# Patient Record
Sex: FEMALE | Race: WHITE | NOT HISPANIC OR LATINO | Employment: OTHER | URBAN - METROPOLITAN AREA
[De-identification: names, ages, dates, MRNs, and addresses within clinical notes are randomized per-mention and may not be internally consistent; named-entity substitution may affect disease eponyms.]

---

## 2017-06-13 ENCOUNTER — TRANSCRIBE ORDERS (OUTPATIENT)
Dept: ADMINISTRATIVE | Facility: HOSPITAL | Age: 62
End: 2017-06-13

## 2017-06-13 DIAGNOSIS — Z00.00 ROUTINE MEDICAL EXAM: ICD-10-CM

## 2017-06-13 DIAGNOSIS — Z78.0 POSTMENOPAUSAL: Primary | ICD-10-CM

## 2017-06-23 ENCOUNTER — HOSPITAL ENCOUNTER (OUTPATIENT)
Dept: MAMMOGRAPHY | Facility: HOSPITAL | Age: 62
Discharge: HOME | End: 2017-06-23
Attending: INTERNAL MEDICINE

## 2017-06-23 ENCOUNTER — HOSPITAL ENCOUNTER (OUTPATIENT)
Dept: BONE DENSITY | Facility: HOSPITAL | Age: 62
Discharge: HOME | End: 2017-06-23
Attending: INTERNAL MEDICINE | Admitting: INTERNAL MEDICINE

## 2017-06-23 DIAGNOSIS — Z78.0 POSTMENOPAUSAL: ICD-10-CM

## 2017-06-23 DIAGNOSIS — Z00.00 ROUTINE MEDICAL EXAM: ICD-10-CM

## 2017-06-23 PROCEDURE — 77080 DXA BONE DENSITY AXIAL: CPT

## 2017-06-23 PROCEDURE — 77063 BREAST TOMOSYNTHESIS BI: CPT

## 2017-06-23 PROCEDURE — G0202 SCR MAMMO BI INCL CAD: HCPCS

## 2017-12-01 ENCOUNTER — HOSPITAL ENCOUNTER (OUTPATIENT)
Dept: GENERAL RADIOLOGY | Facility: HOSPITAL | Age: 62
Discharge: HOME | End: 2017-12-01
Attending: INTERNAL MEDICINE

## 2017-12-01 ENCOUNTER — HOSPITAL ENCOUNTER (OUTPATIENT)
Dept: GENERAL RADIOLOGY | Facility: HOSPITAL | Age: 62
Discharge: HOME | End: 2017-12-01
Attending: INTERNAL MEDICINE | Admitting: INTERNAL MEDICINE

## 2017-12-01 DIAGNOSIS — M47.812 ARTHRITIS OF NECK: ICD-10-CM

## 2017-12-01 DIAGNOSIS — M53.3 DISORDER OF SACRUM: ICD-10-CM

## 2017-12-01 PROCEDURE — 72050 X-RAY EXAM NECK SPINE 4/5VWS: CPT

## 2017-12-01 PROCEDURE — 72110 X-RAY EXAM L-2 SPINE 4/>VWS: CPT

## 2017-12-01 PROCEDURE — 72170 X-RAY EXAM OF PELVIS: CPT

## 2018-04-15 ENCOUNTER — HOSPITAL ENCOUNTER (OUTPATIENT)
Dept: GENERAL RADIOLOGY | Facility: HOSPITAL | Age: 63
Discharge: HOME | End: 2018-04-15
Attending: RADIOLOGY

## 2018-04-15 ENCOUNTER — HOSPITAL ENCOUNTER (EMERGENCY)
Facility: HOSPITAL | Age: 63
End: 2018-04-15

## 2018-04-15 ENCOUNTER — HOSPITAL ENCOUNTER (OUTPATIENT)
Dept: GENERAL RADIOLOGY | Facility: HOSPITAL | Age: 63
Discharge: HOME | End: 2018-04-15
Attending: RADIOLOGY | Admitting: RADIOLOGY

## 2018-04-15 VITALS
RESPIRATION RATE: 16 BRPM | WEIGHT: 145 LBS | OXYGEN SATURATION: 98 % | HEIGHT: 67 IN | HEART RATE: 73 BPM | BODY MASS INDEX: 22.76 KG/M2

## 2018-04-15 DIAGNOSIS — T14.8XXA FRACTURE: ICD-10-CM

## 2018-04-15 PROCEDURE — 73130 X-RAY EXAM OF HAND: CPT

## 2018-04-15 PROCEDURE — 73630 X-RAY EXAM OF FOOT: CPT

## 2018-04-16 ENCOUNTER — TELEPHONE (OUTPATIENT)
Dept: ORTHOPEDIC SURGERY | Facility: CLINIC | Age: 63
End: 2018-04-16

## 2018-04-16 ENCOUNTER — OFFICE VISIT (OUTPATIENT)
Dept: ORTHOPEDIC SURGERY | Facility: CLINIC | Age: 63
End: 2018-04-16

## 2018-04-16 VITALS — BODY MASS INDEX: 23.45 KG/M2 | TEMPERATURE: 98.2 F | HEIGHT: 67 IN | WEIGHT: 149.4 LBS

## 2018-04-16 DIAGNOSIS — M18.12 OSTEOARTHRITIS OF LEFT THUMB: Primary | ICD-10-CM

## 2018-04-16 DIAGNOSIS — S92.355A CLOSED NONDISPLACED FRACTURE OF FIFTH METATARSAL BONE OF LEFT FOOT, INITIAL ENCOUNTER: ICD-10-CM

## 2018-04-16 PROCEDURE — 99204 OFFICE O/P NEW MOD 45 MIN: CPT | Performed by: ORTHOPAEDIC SURGERY

## 2018-04-16 PROCEDURE — 28470 CLTX METATARSAL FX WO MNP EA: CPT | Performed by: ORTHOPAEDIC SURGERY

## 2018-04-16 RX ORDER — LAMOTRIGINE 100 MG/1
TABLET ORAL
Refills: 0 | COMMUNITY
Start: 2018-02-17 | End: 2018-11-28

## 2018-04-16 RX ORDER — LEVOTHYROXINE SODIUM 88 MCG
TABLET ORAL
Refills: 0 | COMMUNITY
Start: 2018-04-06 | End: 2018-04-23 | Stop reason: SDUPTHER

## 2018-04-16 RX ORDER — BUSPIRONE HYDROCHLORIDE 15 MG/1
TABLET ORAL
Refills: 0 | COMMUNITY
Start: 2018-02-17 | End: 2020-12-28 | Stop reason: ALTCHOICE

## 2018-04-16 NOTE — TELEPHONE ENCOUNTER
Dr. Roberto Gutiérrez asking for someone to see his wife. Took her to [facility] for x-ray only (not ER visit) on 4/15. Says she has a metatarsal fracture. Please advise.

## 2018-04-16 NOTE — PROGRESS NOTES
New Patient Complaint      Patient: Cathy Gutiérrez  YOB: 1955 63 y.o. female  Medical Record Number: [id number]    Chief Complaints: I hurt my foot and my thumb    History of Present Illness:    Patient sustained an injury on 4/15/18 when she slipped in his door frame striking her left foot and left thumb.  She's had persistent severe constant aching pain in the left foot and mainly around the IP joint of the thumb with associated redness bruising and swelling worse with standing and walking and improved with rest.  Prior to this she had some mild occasional achiness in the thumb     Her  who is a physician sent her for x-rays and she was worked in here today for evaluation.  She denies any numbness    HPI    Allergies:   Allergies   Allergen Reactions   • Latex Rash       Medications:   Current Outpatient Prescriptions on File Prior to Visit   Medication Sig   • lisinopril (PRINIVIL) 10 MG tablet Take 10 mg by mouth Daily.   • [DISCONTINUED] azithromycin (ZITHROMAX Z-DON) 250 MG tablet Take 2 tablets the first day, then 1 tablet daily for 4 days.   • [DISCONTINUED] benzonatate (TESSALON) 200 MG capsule Take 1 capsule by mouth 3 (Three) Times a Day As Needed for cough.   • [DISCONTINUED] estradiol (CLIMARA) 0.025 MG/24HR patch Place 1 patch on the skin 1 (one) time per week.   • [DISCONTINUED] LamoTRIgine  MG tablet sustained-release 24 hour Take  by mouth.   • [DISCONTINUED] levothyroxine (SYNTHROID) 125 MCG tablet Take  by mouth.   • [DISCONTINUED] MethylPREDNISolone (MEDROL, DON,) 4 MG tablet follow package directions   • [DISCONTINUED] PROAIR RESPICLICK 108 (90 BASE) MCG/ACT inhaler 1-2 inhalations every 6-8 hours as needed   • [DISCONTINUED] zolpidem (AMBIEN) 10 MG tablet Take  by mouth.     No current facility-administered medications on file prior to visit.        Past Medical History:   Diagnosis Date   • Depression    • Disease of thyroid gland    • Hypertension      Past  "Surgical History:   Procedure Laterality Date   • BREAST BIOPSY       Social History     Occupational History   • Not on file.     Social History Main Topics   • Smoking status: Never Smoker   • Smokeless tobacco: Not on file   • Alcohol use Yes   • Drug use: Unknown   • Sexual activity: Not on file      Social History     Social History Narrative   • No narrative on file     Family History   Problem Relation Age of Onset   • Breast cancer Maternal Grandmother    • Breast cancer Paternal Grandmother    • Breast cancer Maternal Aunt    • Breast cancer Paternal Aunt        Review of Systems: 14 point review of systems performed, positive pertinent findings identified in HPI. All remaining systems negative     Review of Systems      Physical Exam:   Vitals:    04/16/18 1321   Temp: 98.2 °F (36.8 °C)   TempSrc: Temporal Artery    Weight: 67.8 kg (149 lb 6.4 oz)   Height: 170.2 cm (67\")     Physical Exam   Constitutional: pleasant, well developed   Eyes: sclera non icteric  Hearing : adequate for exam  Cardiovascular: palpable pulses in left foot and wrist, left calf/ thigh NT without sign of DVT  Respiratoy: breathing unlabored   Neurological: grossly sensate to LT throughout left LE and UE  Psychiatric: oriented with normal mood and affect.   Lymphatic: No palpable popliteal lymphadenopathy left LE  Skin: intact throughout left leg/foot/hand  Musculoskeletal: Left foot shows mild discomfort over the distal aspect of the fifth metatarsal some mild swelling and bruising she was nontender proximally there was no tenderness to palpation about the ankle.  Left thumb showed mild discomfort around the thumb IP joint but she was able to actively flex and extend this.  There was minimal discomfort at the MCP and mild to moderate discomfort at the CMC joint.  No clinical instability was noted at the MCP joint  Physical Exam  Ortho Exam    Radiology: 3 views of the left hand and 3 views of the left foot were reviewed on the " [facility] system from 4/15/18.  There is a nondisplaced oblique fracture of the distal aspect of the fifth metatarsal.  I do not see any obvious fractures in the hand there is arthritic change at the thumb CMC more so than the IP or MCP joint.      Assessment/Plan: 1.  Left fifth metatarsal fracture  2.  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  She was fitted with a thumb spica splint and if symptoms persist we'll get an MRI    Left fifth metatarsal fracture is amenable to closed treatment at this time if she has persistent pain or displacement this may require surgical treatment but I think this is unlikely.  She was fitted with a short boot today for use with any weightbearing and will use a cane that she already has from a friend.  I will see her back in one week with x-rays of her left foot.

## 2018-04-18 ENCOUNTER — TELEPHONE (OUTPATIENT)
Dept: ORTHOPEDIC SURGERY | Facility: CLINIC | Age: 63
End: 2018-04-18

## 2018-04-23 ENCOUNTER — OFFICE VISIT (OUTPATIENT)
Dept: ORTHOPEDIC SURGERY | Facility: CLINIC | Age: 63
End: 2018-04-23

## 2018-04-23 VITALS — HEIGHT: 67 IN | TEMPERATURE: 97.7 F | WEIGHT: 151 LBS | BODY MASS INDEX: 23.7 KG/M2

## 2018-04-23 DIAGNOSIS — Z87.81 HISTORY OF FRACTURE: ICD-10-CM

## 2018-04-23 DIAGNOSIS — M18.12 OSTEOARTHRITIS OF LEFT THUMB: Primary | ICD-10-CM

## 2018-04-23 DIAGNOSIS — S92.355D CLOSED NONDISPLACED FRACTURE OF FIFTH METATARSAL BONE OF LEFT FOOT WITH ROUTINE HEALING, SUBSEQUENT ENCOUNTER: ICD-10-CM

## 2018-04-23 PROCEDURE — 99024 POSTOP FOLLOW-UP VISIT: CPT | Performed by: ORTHOPAEDIC SURGERY

## 2018-04-23 PROCEDURE — 73630 X-RAY EXAM OF FOOT: CPT | Performed by: ORTHOPAEDIC SURGERY

## 2018-04-23 RX ORDER — DEXTROAMPHETAMINE SACCHARATE, AMPHETAMINE ASPARTATE, DEXTROAMPHETAMINE SULFATE AND AMPHETAMINE SULFATE 2.5; 2.5; 2.5; 2.5 MG/1; MG/1; MG/1; MG/1
2 TABLET ORAL DAILY
Refills: 0 | COMMUNITY
Start: 2018-04-21 | End: 2022-03-09 | Stop reason: SDUPTHER

## 2018-04-23 RX ORDER — LEVOTHYROXINE SODIUM 112 MCG
112 TABLET ORAL
Refills: 0 | COMMUNITY
Start: 2018-04-18 | End: 2018-08-16

## 2018-04-23 NOTE — PROGRESS NOTES
"Foot Follow Up      Patient: Cathy Gutiérrez    YOB: 1955 63 y.o. female    Chief Complaints: Foot pain    History of Present Illness: Follows up injury from 4/15/18 with initial evaluation on 4/16/18 for left fifth metatarsal fracture and thumb injury.    She's been using a boot and complains of mild intermittent aching pain mainly on the lateral aspect left fifth metatarsal distally with a boot \"pushes on it\".  Thumb feels a little better but some moderate occasional intermittent achiness that has been improved some with use of the splint.  HPI    ROS: Foot pain my thumb pain  Past Medical History:   Diagnosis Date   • Depression    • Disease of thyroid gland    • Hypertension      Physical Exam:   Vitals:    04/23/18 1100   Temp: 97.7 °F (36.5 °C)   TempSrc: Temporal Artery    Weight: 68.5 kg (151 lb)   Height: 170.2 cm (67\")     Well developed with normal mood.Foot shows mild swelling along the fifth metatarsal with discomfort palpation distally but no skin breakdown.  Left thumb shows some mild discomfort around the MCP more so than CMC joint with chronic-appearing arthritic change.  She has discomfort with flexion and extension of the thumb.      Radiology: 3 views left foot ordered to evaluate pain reviewed and compared with previous x-rays.  There is been minimal if any change in alignment to the fifth metatarsal oblique distal fracture certainly nothing that would require surgical treatment based on these x-rays      Assessment/Plan:  1.  Left fifth metatarsal fracture  Patient will continue with her boot have her fitted with a cane as she still having some discomfort with ambulation.  She needs to wear this whenever she is weightbearing and I will see her back in 2 weeks' x-rays of her left foot.    2.  Persistent left thumb pain.  She has seen Dr Borrero in the past for her elbow and we'll make arrangements for her to see him for further evaluation and treatment options regarding her " thumb.    I will see her back in 2 weeks' x-rays of her left foot.

## 2018-04-27 ENCOUNTER — TELEPHONE (OUTPATIENT)
Dept: ORTHOPEDIC SURGERY | Facility: CLINIC | Age: 63
End: 2018-04-27

## 2018-04-30 NOTE — TELEPHONE ENCOUNTER
I returned patient's phone call she was inquiring about physical therapy limitations regarding her foot.  She is doing physical therapy for her hip.  I stated that she could do physical therapy for hip with an the parameters of partial weightbearing on the boot with the cane only.  She stated she did not really use the cane much over the weekend and was continuing to have pain in the foot.  I recommended that she limit activities and use a cane for partial weightbearing for the foot only.  If symptoms worsen she'll let me know

## 2018-05-07 ENCOUNTER — OFFICE VISIT (OUTPATIENT)
Dept: ORTHOPEDIC SURGERY | Facility: CLINIC | Age: 63
End: 2018-05-07

## 2018-05-07 VITALS — HEIGHT: 67 IN | BODY MASS INDEX: 23.7 KG/M2 | WEIGHT: 151 LBS | TEMPERATURE: 98 F

## 2018-05-07 DIAGNOSIS — B35.1 ONYCHOMYCOSIS: Primary | ICD-10-CM

## 2018-05-07 DIAGNOSIS — M20.42 HAMMER TOE OF LEFT FOOT: ICD-10-CM

## 2018-05-07 DIAGNOSIS — S92.355D CLOSED NONDISPLACED FRACTURE OF FIFTH METATARSAL BONE OF LEFT FOOT WITH ROUTINE HEALING, SUBSEQUENT ENCOUNTER: ICD-10-CM

## 2018-05-07 PROCEDURE — 73630 X-RAY EXAM OF FOOT: CPT | Performed by: ORTHOPAEDIC SURGERY

## 2018-05-07 PROCEDURE — 99024 POSTOP FOLLOW-UP VISIT: CPT | Performed by: ORTHOPAEDIC SURGERY

## 2018-05-07 RX ORDER — MELOXICAM 15 MG/1
15 TABLET ORAL DAILY
Refills: 0 | COMMUNITY
Start: 2018-05-02 | End: 2018-08-16

## 2018-05-07 NOTE — PROGRESS NOTES
"Foot Follow Up      Patient: Cathy Gutiérrez    YOB: 1955 63 y.o. female    Chief Complaints: Foot pain    History of Present Illness: Injured left foot on 4/15/18 with initial evaluation on 4/16/18 with left fifth metatarsal fracture.    She was last seen on 4/23/18 with instructions to use her cane and boot for protected weightbearing.  She's only been using a cane occasionally and states that she \"keeps losing it\".  She's been on her foot ,sounds like quite a bit cleaning walking etc.  She states the pain is somewhat better but still gets some mild intermittent aching pain with swelling in the lateral aspect of the foot.    She also asked me look at her left second and third toes which she says she gets an occasional mild discomfort where they rub in her shoe.    She had bunion surgery by Dr. Rodriguez many years ago and then about a year and a half ago lived in [place] and did an extensive amount of dancing with some increased pain and swelling around the second and third MTP and PIP joints which has improved to some degree but with residual deformity.    She also asked me look at her right first toe where she has had fungal changes to the great toenail.  HPI    ROS: Foot pain  Past Medical History:   Diagnosis Date   • Depression    • Disease of thyroid gland    • Hypertension      Physical Exam:   Vitals:    05/07/18 1146   Temp: 98 °F (36.7 °C)   Weight: 68.5 kg (151 lb)   Height: 170.2 cm (67\")     Well developed with normal mood.Left foot shows minimal swelling along the fifth metatarsal and only slight discomfort palpation without clinical deformity.  There is well-healed incisions the first MTP joint slight relative elongation of the second and third toes with some slight hammering at the PIP joint.  No irritability or gross instability to the second or third MTP joints.    Right great toe shows some in curving medially and laterally with onychomycotic changes but no sign of " "infection.      Radiology: 3 views of the right foot ordered to evaluate pain reviewed and compared with previous x-rays.  There is been a very slight shift proximally of the fifth metatarsal fracture but no appreciable change in alignment as far as angulation or rotation.  There is relative elongation of the second and third metatarsals compared with the first with some mild arthritic change of the first MTP joint with evidence of previous bunion correction there is some chronic questionable flattening of the second metatarsal head      Assessment/Plan:  1.  Left fifth metatarsal fracture    I reviewed these x-rays findings with her.  Counseled her that I would not anticipate any surgical treatment for this but she needs to continue use of her boot limit activity as much as possible and she was fitted with a better fitted cane today as well as a \"even up\" if she's been having some back pain it's been aggravated due to unevenness of her boot.    2.  Left thumb pain she has seen a hand specialist for this and was told this was arthritis and will continue to follow with him for any problems with that.  3.  Left second and third hammertoes.  We discussed treatment options and nothing I would do from a surgical standpoint at this time.  I demonstrated stretching exercises for her to do several times a day if symptoms persist or worsen may require further bracing stretching or surgical treatment  4.  Right great toe onychomycosis.  I gave her the name and number of Dr. Stu Aburto a reputable podiatrist for evaluation and treatment of this.    We had a very pleasant visit I will see her back in 3 weeks' x-rays of her left foot.  If she has any increase in pain in the interim she'll let me know  "

## 2018-05-30 ENCOUNTER — OFFICE VISIT (OUTPATIENT)
Dept: ORTHOPEDIC SURGERY | Facility: CLINIC | Age: 63
End: 2018-05-30

## 2018-05-30 VITALS — WEIGHT: 151 LBS | HEIGHT: 67 IN | TEMPERATURE: 98.1 F | BODY MASS INDEX: 23.7 KG/M2

## 2018-05-30 DIAGNOSIS — M20.42 HAMMER TOE OF LEFT FOOT: ICD-10-CM

## 2018-05-30 DIAGNOSIS — S92.355D CLOSED NONDISPLACED FRACTURE OF FIFTH METATARSAL BONE OF LEFT FOOT WITH ROUTINE HEALING, SUBSEQUENT ENCOUNTER: ICD-10-CM

## 2018-05-30 DIAGNOSIS — S92.902D CLOSED FRACTURE OF LEFT FOOT WITH ROUTINE HEALING, SUBSEQUENT ENCOUNTER: Primary | ICD-10-CM

## 2018-05-30 PROCEDURE — 99024 POSTOP FOLLOW-UP VISIT: CPT | Performed by: ORTHOPAEDIC SURGERY

## 2018-05-30 PROCEDURE — 73630 X-RAY EXAM OF FOOT: CPT | Performed by: ORTHOPAEDIC SURGERY

## 2018-05-30 NOTE — PROGRESS NOTES
"Foot Follow Up      Patient: Cathy Gutiérrez    YOB: 1955 63 y.o. female    Chief Complaints: Foot pain    History of Present Illness: Injured left foot on 4/15/18 with initial evaluation on 4/16/18 for left fifth metatarsal fracture she was last seen 5/7/18 and has continued to use her boot as well as a cane but \"keeps losing it around her house\".  Pain has improved to the left foot with intermittent mild aching pain in the lateral aspect of the left foot.  At her last visit, I also saw for left second and third hammertoes with some discomfort when they  rub in her shoe. she does not have any problems with them since then because she's been in her boot.    She also saw a podiatrist for her toenails and is being treated for nail pathology there  HPI    ROS: Foot pain fevers chills chest pain or shortness of breath  Past Medical History:   Diagnosis Date   • Depression    • Disease of thyroid gland    • Hypertension      Physical Exam:   Vitals:    05/30/18 1037   Temp: 98.1 °F (36.7 °C)   Weight: 68.5 kg (151 lb)   Height: 170.2 cm (67\")     Well developed with normal mood.Left foot shows only minimal swelling.  There was only slight discomfort along the fifth metatarsal to palpation and with manipulation to that area.  Left second and third toes showed again slight relative elongation with some slight hammering but no gross instability at the second or third MTP joints slight cockup deformity      Radiology:3 Views of left foot ordered to evaluate fracture reviewed and compared with previous x-rays.  No change in alignment compared with previous views there does appear to be some increased callus formation      Assessment/Plan:  .  Left fifth metatarsal fracture  2.  Left second and third hammertoes  3.  Right great toe onychomycosis    She'll continue with podiatry for nail care.  Regarding the fifth metatarsal fracture I don't see anything I would do from an operative standpoint at this time.  " "She'll continue with her boot and gave her prescription for a quad cane at her request.  She's been using some type of \"electromagnetic mat\" that a friend loaned her which I told her I didn't think would harm her to use, but that she does not qualify for a bone stimulator.    Reviewed with her that if fracture does not heal may require surgical treatment but I would not anticipate that at this point    Nothing I would do from an operative standpoint for the hammertokika at this time.  Once we get her over the fracture we can talk about more aggressive stretching etc.    We had a pleasant visit I will see her back in 3 weeks' x-rays of her left foot  "

## 2018-06-14 ENCOUNTER — TRANSCRIBE ORDERS (OUTPATIENT)
Dept: ADMINISTRATIVE | Facility: HOSPITAL | Age: 63
End: 2018-06-14

## 2018-06-14 DIAGNOSIS — Z12.39 SCREENING BREAST EXAMINATION: Primary | ICD-10-CM

## 2018-06-20 ENCOUNTER — LAB (OUTPATIENT)
Dept: LAB | Facility: HOSPITAL | Age: 63
End: 2018-06-20

## 2018-06-20 ENCOUNTER — OFFICE VISIT (OUTPATIENT)
Dept: ORTHOPEDIC SURGERY | Facility: CLINIC | Age: 63
End: 2018-06-20

## 2018-06-20 ENCOUNTER — TELEPHONE (OUTPATIENT)
Dept: ORTHOPEDIC SURGERY | Facility: CLINIC | Age: 63
End: 2018-06-20

## 2018-06-20 VITALS — BODY MASS INDEX: 23.7 KG/M2 | TEMPERATURE: 98.2 F | WEIGHT: 151 LBS | HEIGHT: 67 IN

## 2018-06-20 DIAGNOSIS — M20.42 HAMMER TOE OF LEFT FOOT: ICD-10-CM

## 2018-06-20 DIAGNOSIS — Z87.81 HISTORY OF FRACTURE: ICD-10-CM

## 2018-06-20 DIAGNOSIS — S92.355D CLOSED NONDISPLACED FRACTURE OF FIFTH METATARSAL BONE OF LEFT FOOT WITH ROUTINE HEALING, SUBSEQUENT ENCOUNTER: ICD-10-CM

## 2018-06-20 DIAGNOSIS — E55.9 VITAMIN D DEFICIENCY: ICD-10-CM

## 2018-06-20 DIAGNOSIS — E55.9 VITAMIN D DEFICIENCY: Primary | ICD-10-CM

## 2018-06-20 LAB — 25(OH)D3 SERPL-MCNC: 26.5 NG/ML (ref 30–100)

## 2018-06-20 PROCEDURE — 73630 X-RAY EXAM OF FOOT: CPT | Performed by: ORTHOPAEDIC SURGERY

## 2018-06-20 PROCEDURE — 99024 POSTOP FOLLOW-UP VISIT: CPT | Performed by: ORTHOPAEDIC SURGERY

## 2018-06-20 PROCEDURE — 82306 VITAMIN D 25 HYDROXY: CPT

## 2018-06-20 PROCEDURE — 36415 COLL VENOUS BLD VENIPUNCTURE: CPT

## 2018-06-20 RX ORDER — ERGOCALCIFEROL 1.25 MG/1
50000 CAPSULE ORAL
Qty: 4 CAPSULE | Refills: 1 | Status: SHIPPED | OUTPATIENT
Start: 2018-06-20 | End: 2021-02-22

## 2018-06-20 NOTE — PROGRESS NOTES
"Foot Follow Up      Patient: Cathy Gutiérrez    YOB: 1955 63 y.o. female    Chief Complaints: Foot doing okay    History of Present Illness:Injured left foot on 4/15/18 with initial evaluation on 4/16/18 for left fifth metatarsal fracture.  She was last seen on 5/30/18 instructions to continue with her boot and crutches.  She still complains of some mild intermittent throbbing pain in the lateral aspect of the left foot along the fifth metatarsal with prolonged walking and standing with associated swelling.  She has been up and about quite a bit on it and went to [facility].    She is now seeing podiatry for her toenails.  Her hammertoes are only mildly bothersome when she wears shoes.  HPI    ROS: Foot pain  Past Medical History:   Diagnosis Date   • Depression    • Disease of thyroid gland    • Hypertension      Physical Exam:   Vitals:    06/20/18 1120   Temp: 98.2 °F (36.8 °C)   Weight: 68.5 kg (151 lb)   Height: 170.2 cm (67\")     Well developed with normal mood.Left foot showed only slight swelling along the fifth metatarsal with only slight discomfort to palpation without any evidence of instability on palpation or manipulation.      Radiology:3 views left foot ordered to evaluate fracture reviewed and compared with previous x-rays.  There is been no change in alignment to the distal oblique fifth metatarsal fracture does appear to be some interval callus formation      Assessment/Plan:  Left fifth metatarsal fracture  2.  Left second and third hammertoes  3.  Right great toe onychomycosis    She will continue with podiatric care for her nails.  Regarding her left fifth metatarsal fracture reviewed her x-ray findings with her in detail and as her pain has improved to some degree and I do see some early callus formation would not recommend surgical treatment for this nor does she desire any.    She'll continue with her boot and may wean off of her cane as her pain allows him limit " activity as pain and swelling that it.  I do think we need to check a vitamin D level on her as she is most likely deficient and could benefit from augmentation for healing this fracture.  She understands that should it fail to heal or if she has persistent or worsening pain this may require surgical treatment.    Nothing to do with the hammertoes at this time.  We'll get her fracture healed and then deal with those.    We had a very pleasant visit I will see her back in 4 weeks' x-rays of her left foot

## 2018-06-20 NOTE — TELEPHONE ENCOUNTER
I spoke with patient about her vitamin D result.  She is below normal at 26.5.  We'll start her on D3 50,000 units weekly for 4 weeks and then recheck.  She understands to have this level rechecked in 4 weeks and I will place the ordered today.

## 2018-06-27 ENCOUNTER — TRANSCRIBE ORDERS (OUTPATIENT)
Dept: ADMINISTRATIVE | Facility: HOSPITAL | Age: 63
End: 2018-06-27

## 2018-06-27 DIAGNOSIS — M79.18 RIGHT BUTTOCK PAIN: Primary | ICD-10-CM

## 2018-07-05 ENCOUNTER — HOSPITAL ENCOUNTER (OUTPATIENT)
Dept: MAMMOGRAPHY | Facility: HOSPITAL | Age: 63
Discharge: HOME | End: 2018-07-05
Attending: INTERNAL MEDICINE | Admitting: INTERNAL MEDICINE

## 2018-07-05 DIAGNOSIS — Z12.39 SCREENING BREAST EXAMINATION: ICD-10-CM

## 2018-07-05 PROCEDURE — 77067 SCR MAMMO BI INCL CAD: CPT

## 2018-07-05 PROCEDURE — 77063 BREAST TOMOSYNTHESIS BI: CPT

## 2018-07-06 ENCOUNTER — HOSPITAL ENCOUNTER (OUTPATIENT)
Dept: MRI IMAGING | Facility: HOSPITAL | Age: 63
Discharge: HOME | End: 2018-07-06
Attending: INTERNAL MEDICINE | Admitting: INTERNAL MEDICINE

## 2018-07-06 DIAGNOSIS — M79.18 RIGHT BUTTOCK PAIN: ICD-10-CM

## 2018-07-06 PROCEDURE — 72148 MRI LUMBAR SPINE W/O DYE: CPT

## 2018-07-18 ENCOUNTER — OFFICE VISIT (OUTPATIENT)
Dept: ORTHOPEDIC SURGERY | Facility: CLINIC | Age: 63
End: 2018-07-18

## 2018-07-18 VITALS — WEIGHT: 151 LBS | HEIGHT: 67 IN | TEMPERATURE: 98 F | BODY MASS INDEX: 23.7 KG/M2

## 2018-07-18 DIAGNOSIS — S92.355D CLOSED NONDISPLACED FRACTURE OF FIFTH METATARSAL BONE OF LEFT FOOT WITH ROUTINE HEALING, SUBSEQUENT ENCOUNTER: Primary | ICD-10-CM

## 2018-07-18 DIAGNOSIS — E55.9 VITAMIN D DEFICIENCY: ICD-10-CM

## 2018-07-18 PROCEDURE — 99213 OFFICE O/P EST LOW 20 MIN: CPT | Performed by: ORTHOPAEDIC SURGERY

## 2018-07-18 PROCEDURE — 73630 X-RAY EXAM OF FOOT: CPT | Performed by: ORTHOPAEDIC SURGERY

## 2018-07-18 NOTE — PROGRESS NOTES
"Foot Follow Up      Patient: Cathy Gutiérrez    YOB: 1955 63 y.o. female    Chief Complaints: Foot feeling better    History of Present Illness: Patient follows up left foot injury on 4/15/18 with initial evaluation on 4/16/18 for left fifth metatarsal fracture.  She was last seen on 6/20/18 and states since then it has felt some better.  She is a boot and a cane for ambulation.  She's also been sent to podiatry for fungal changes to her toenails and had some mild symptomatic hammertoe which has not been bothering her while she is wearing her boot.  HPI    ROS: Foot pain  Past Medical History:   Diagnosis Date   • Depression    • Disease of thyroid gland    • Hypertension      Physical Exam:   Vitals:    07/18/18 1108   Temp: 98 °F (36.7 °C)   Weight: 68.5 kg (151 lb)   Height: 170.2 cm (67\")     Well developed with normal mood.Left foot shows only mild swelling on the fifth metatarsal and minimal discomfort palpation and manipulation around the fifth metatarsal which was not vigorous.  There is only slight hammering of left second toe with no warmth erythema or swelling      Radiology: 3 views left foot were ordered to evaluate fracture alignment reviewed and compared with previous x-rays.  There is been no change in alignment to the oblique distal fifth metatarsal fracture with some interval callus formation    Vitamin D 6/20/18 26.5      Assessment/Plan:  1.  Left fifth metatarsal fracture  2.  Left second hammertoe  3.  Vitamin D deficiency    I spoke with her about her results on 6/20/18 and sent a prescription for vitamin D 50,000 units to be taken once a week however there was miscommunication of the instructions and she took it twice a week for 2 weeks and has not taken it since.  She understands to have her level rechecked as soon as possible so we can adjust dosage as needed.    Regarding her foot hammertoes not terribly symptomatic at this time and the fifth metatarsal fracture is " improving clinically and radiographically and I would not recommend surgical treatment for it at this time.    She was transitioned to a postoperative shoe and may use that as long as there is no exacerbation of pain.    We had a pleasant visit and I will see her back in 4 weeks' x-rays of her left foot

## 2018-07-24 ENCOUNTER — TELEPHONE (OUTPATIENT)
Dept: ORTHOPEDIC SURGERY | Facility: CLINIC | Age: 63
End: 2018-07-24

## 2018-07-25 NOTE — TELEPHONE ENCOUNTER
There is already an order for one...she just needs to go to outpt lab at hospital to have it drawn. Please notifiy her of this

## 2018-08-16 ENCOUNTER — OFFICE VISIT (OUTPATIENT)
Dept: ORTHOPEDIC SURGERY | Facility: CLINIC | Age: 63
End: 2018-08-16

## 2018-08-16 ENCOUNTER — LAB (OUTPATIENT)
Dept: LAB | Facility: HOSPITAL | Age: 63
End: 2018-08-16
Attending: ORTHOPAEDIC SURGERY

## 2018-08-16 VITALS — HEIGHT: 67 IN | TEMPERATURE: 98.1 F | BODY MASS INDEX: 23.7 KG/M2 | WEIGHT: 151 LBS

## 2018-08-16 DIAGNOSIS — S92.355D CLOSED NONDISPLACED FRACTURE OF FIFTH METATARSAL BONE OF LEFT FOOT WITH ROUTINE HEALING, SUBSEQUENT ENCOUNTER: Primary | ICD-10-CM

## 2018-08-16 DIAGNOSIS — B35.1 ONYCHOMYCOSIS: ICD-10-CM

## 2018-08-16 DIAGNOSIS — E55.9 VITAMIN D DEFICIENCY: ICD-10-CM

## 2018-08-16 DIAGNOSIS — M20.42 HAMMER TOE OF LEFT FOOT: ICD-10-CM

## 2018-08-16 LAB — 25(OH)D3 SERPL-MCNC: 24.3 NG/ML (ref 30–100)

## 2018-08-16 PROCEDURE — 99213 OFFICE O/P EST LOW 20 MIN: CPT | Performed by: ORTHOPAEDIC SURGERY

## 2018-08-16 PROCEDURE — 73630 X-RAY EXAM OF FOOT: CPT | Performed by: ORTHOPAEDIC SURGERY

## 2018-08-16 PROCEDURE — 36415 COLL VENOUS BLD VENIPUNCTURE: CPT

## 2018-08-16 PROCEDURE — 82306 VITAMIN D 25 HYDROXY: CPT

## 2018-08-16 RX ORDER — LAMOTRIGINE 200 MG/1
TABLET, EXTENDED RELEASE ORAL
Refills: 0 | COMMUNITY
Start: 2018-08-14 | End: 2021-02-22

## 2018-08-16 RX ORDER — IRBESARTAN 300 MG/1
TABLET ORAL
Refills: 0 | COMMUNITY
Start: 2018-08-14 | End: 2020-12-28 | Stop reason: ALTCHOICE

## 2018-08-16 RX ORDER — LEVOTHYROXINE SODIUM 125 MCG
125 TABLET ORAL EVERY MORNING
Refills: 0 | COMMUNITY
Start: 2018-08-14 | End: 2021-02-25 | Stop reason: DRUGHIGH

## 2018-08-16 RX ORDER — ZOLPIDEM TARTRATE 10 MG/1
TABLET ORAL
Refills: 0 | COMMUNITY
Start: 2018-07-24 | End: 2018-11-28

## 2018-08-16 RX ORDER — ESTRADIOL 0.1 MG/D
FILM, EXTENDED RELEASE TRANSDERMAL
Refills: 0 | COMMUNITY
Start: 2018-07-21 | End: 2021-02-22

## 2018-08-16 NOTE — PROGRESS NOTES
"Foot Follow Up      Patient: Cathy Gutiérrez    YOB: 1955 63 y.o. female    Chief Complaints: Foot pain    History of Present Illness:Patient follows up left foot injury on 4/15/18 with initial evaluation on 4/16/18 for left fifth metatarsal fracture.  She was seen on 6/20/18 and states since then it has felt some better.  She is a boot and a cane for ambulation.  She's also been sent to podiatry for fungal changes to her toenails and had some mild symptomatic hammertoe which has not been bothering her while she is wearing her boot.    She was last seen on 7/18/18 at which time reviewed with her that her vitamin D results from 6/20/18 were 26.5 and had spoken with her prior to this and due to some miscommunication she took her vitamin D 50,000 units 2 times per week for 2 weeks rather than once a week.  At that time I recommended that she go have her level rechecked.    Regarding her fifth metatarsal fracture she was allowed to transition to a postoperative shoe as long as there is no exacerbation of pain.    Her hammertoe was not terribly symptomatic at that time and she been sent to podiatry for fungal changes to her toenail previously    She did not get her vitamin D level checked but is taking a calcium supplement with vitamin D in it.    She has only slight occasional achiness in the left foot she's been using a cane in her boot.    HPI  HPI    ROS: Foot pain  Past Medical History:   Diagnosis Date   • Depression    • Disease of thyroid gland    • Hypertension      Physical Exam:   Vitals:    08/16/18 1335   Temp: 98.1 °F (36.7 °C)   Weight: 68.5 kg (151 lb)   Height: 170.2 cm (67\")     Well developed with normal mood.  Examination of left foot shows minimal discomfort along the fifth metatarsal but little to no swelling.  There is very slight cockup deformity of the second toe with minimal if any discomfort palpation      Radiology: 3 views left foot ordered to evaluate fracture reviewed and " compared with previous x-rays the show improved callus formation without change in alignment to the fifth metatarsal fracture      Assessment/Plan:  1.  Left fifth metatarsal fracture  2.  Left second hammertoe  3.  Vitamin D deficiency    Reviewed with her that I would not recommend anything from an operative standpoint.  She will wean out of her boot into a postoperative shoe that short he has and may discontinue use of her cane.  However start some physical therapy for gait training and strengthening.    I would not recommend anything surgical for her second toe at this time but the may work on some stretching exercises at therapy.  I will see her back in 3 weeks' x-rays of her left foot.  She's been go have her vitamin D level checked today.

## 2018-08-17 ENCOUNTER — TELEPHONE (OUTPATIENT)
Dept: ORTHOPEDIC SURGERY | Facility: CLINIC | Age: 63
End: 2018-08-17

## 2018-08-17 DIAGNOSIS — E55.9 VITAMIN D DEFICIENCY: Primary | ICD-10-CM

## 2018-08-17 RX ORDER — CHOLECALCIFEROL (VITAMIN D3) 1250 MCG
50000 CAPSULE ORAL 2 TIMES WEEKLY
Qty: 8 CAPSULE | Refills: 0 | Status: SHIPPED | OUTPATIENT
Start: 2018-08-20 | End: 2018-09-14

## 2018-08-17 NOTE — TELEPHONE ENCOUNTER
----- Message from Angelito Garcia MD sent at 8/16/2018  3:42 PM EDT -----  Please put on D3 50 k biw and recheck in 4 weeks,thanks

## 2018-08-17 NOTE — TELEPHONE ENCOUNTER
Serum vitamin D level was 24.3.  Have spoken with the patient.  Dr. Garcia is recommending that she begin vitamin D 3 50,000 units twice a week for the next month.  I have E prescribed a new prescription to [facility] at [phone] for vitamin D 3 50,000 units, #8, directions her to take 1 capsule twice a week.  I have also made arrangements for her to have a  repeat serum vitamin D level drawn the week of September 10 per Dr. Garcia

## 2018-08-28 ENCOUNTER — HOSPITAL ENCOUNTER (OUTPATIENT)
Dept: PHYSICAL THERAPY | Facility: HOSPITAL | Age: 63
Setting detail: THERAPIES SERIES
Discharge: HOME | End: 2018-08-28
Attending: ORTHOPAEDIC SURGERY

## 2018-08-28 DIAGNOSIS — M79.672 LEFT FOOT PAIN: Primary | ICD-10-CM

## 2018-08-28 DIAGNOSIS — S92.355D CLOSED NONDISPLACED FRACTURE OF FIFTH METATARSAL BONE OF LEFT FOOT WITH ROUTINE HEALING, SUBSEQUENT ENCOUNTER: ICD-10-CM

## 2018-08-28 PROCEDURE — 97162 PT EVAL MOD COMPLEX 30 MIN: CPT | Performed by: PHYSICAL THERAPIST

## 2018-08-28 PROCEDURE — 97110 THERAPEUTIC EXERCISES: CPT | Performed by: PHYSICAL THERAPIST

## 2018-08-28 NOTE — THERAPY EVALUATION
Outpatient Physical Therapy Ortho Initial Evaluation  [facility]     Patient Name: Cathy Gutiérrez  : 1955  MRN: [id number]  Today's Date: 2018      Visit Date: 2018    Patient Active Problem List   Diagnosis   • Closed nondisplaced fracture of fifth left metatarsal bone   • Osteoarthritis of left thumb   • Onychomycosis   • Hammer toe of left foot   • Vitamin D deficiency        Past Medical History:   Diagnosis Date   • Depression    • Disease of thyroid gland    • Hypertension         Past Surgical History:   Procedure Laterality Date   • BREAST BIOPSY         Visit Dx:     ICD-10-CM ICD-9-CM   1. Left foot pain M79.672 729.5   2. Closed nondisplaced fracture of fifth metatarsal bone of left foot with routine healing, subsequent encounter S92.355D V54.19             Patient History     Row Name 18 1500             History    Chief Complaint Difficulty Walking;Difficulty with daily activities;Muscle tenderness;Pain  -      Date Current Problem(s) Began 18  -      Brief Description of Current Complaint Pt slipped on her rug at home and fell, fracturing her L 5th MT. She reports that 3 weeks after the original infury she was out of her boot at home and mad made the fracture worse with turning the ankle. She fell a year prior on her stairs and had a L hamstring injury (she did some PT for that around the time of the fall). She has also had a bunionectomy on the L and R by Dr. Rodriguez. She is now in a shoe. She has complaints of back pain as well with pain into the R LE. She had an MRI of the back (in [facility] records)  -      Patient/Caregiver Goals Relieve pain;Return to prior level of function;Improve mobility;Improve strength  -      Patient's Rating of General Health Good  -      How has patient tried to help current problem? meds, rest  -      What clinical tests have you had for this problem? X-ray  -         Pain     Pain Location Foot  -      Pain at  Present 5  -LH      Pain at Worst 9   more in the back  -LH      Pain Frequency Constant/continuous  -LH      What Performance Factors Make the Current Problem(s) WORSE? walking, standing, stairs  -      What Performance Factors Make the Current Problem(s) BETTER? meds, getting off of it  -         Fall Risk Assessment    Any falls in the past year: Yes  -LH      Number of falls reported in the last 12 months 1  -      Factors that contributed to the fall: Uneven surface  -         Services    Prior Rehab/Home Health Experiences Yes  -LH      Are you currently receiving Home Health services No  -LH         Daily Activities    Primary Language English  -      How does patient learn best? Reading  -      Teaching needs identified Home Exercise Program;Management of Condition  -      Patient is concerned about/has problems with Climbing Stairs;Performing home management (household chores, shopping, care of dependents);Performing sports, recreation, and play activities;Walking;Standing  -      Does patient have problems with the following? Anxiety  -      Barriers to learning None  -      Pt Participated in POC and Goals Yes  -         Safety    Are you being hurt, hit, or frightened by anyone at home or in your life? No  -LH      Are you being neglected by a caregiver No  -        User Key  (r) = Recorded By, (t) = Taken By, (c) = Cosigned By    Initials Name Provider Type     Kelsey Martinez PT Physical Therapist                PT Ortho     Row Name 08/28/18 1500       Posture/Observations    Alignment Options Lumbar lordosis;Iliac crests;Scoliosis  -LH    Scoliosis Mild  -LH    Lumbar lordosis Decreased  -LH    Iliac crests Left:;Mild;Elevated  -LH    Observations Edema   along R 5th MT  -LH       Hip/Thigh Palpation    Greater Trochanter Right:;Tender  -LH    Piriformis Tender;Right:  -LH    SI Right:;Tender  -LH       Hip Special Tests    HUMERA (hip vs SI pathology) Right:;Positive  -LH        Foot/Ankle Palpation    Metatarsals Right:;Tender;Swollen   5th  -LH       Right Lower Ext    Rt Ankle Dorsiflexion AROM 15 deg  -LH    Rt Ankle Plantarflexion AROM 50 deg  -LH    Rt Ankle Inversion AROM 40 deg  -LH    Rt Ankle Eversion AROM 18 deg  -LH       Left Lower Ext    Lt Ankle Dorsiflexion AROM 5 deg  -LH    Lt Ankle Plantarflexion AROM 42 deg  -LH    Lt Ankle Inversion AROM 38 deg  -LH    Lt Ankle Eversion AROM 14 deg  -LH       MMT Right Lower Ext    Rt Ankle Plantarflexion MMT, Gross Movement (5/5) normal  -LH    Rt Ankle Dorsiflexion MMT, Gross Movement (5/5) normal  -LH    Rt Ankle Subtalar Inversion MT, Gross Movement (5/5) normal  -LH    Rt Ankle Subtalar Eversion MMT, Gross Movement (5/5) normal  -LH       MMT Left Lower Ext    Lt Ankle Plantarflexion MMT, Gross Movement (4-/5) good minus  -LH    Lt Ankle Dorsiflexion MMT, Gross Movement (4-/5) good minus  -LH    Lt Ankle Subtalar Inversion MMT, Gross Movement (4-/5) good minus  -LH    Lt Ankle Subtalar Eversion MMT, Gross Movement (3/5) fair   some pain along 5th MT  -LH       Ankle Girth    Mid Tarsal- Right 20.3  -    Mid Tarsal- Left 20.1  -    MTP Joints- Right 21.2  -    MTP Joints- Left 20.8  -       Balance Skills Training    SLS R=8 sec (pain in buttock), L=4 sec pain in lateral foot  -       Gait/Stairs Assessment/Training    Comment (Gait/Stairs) mild antalgia with glute med type shift over the R LE (normalized with use of SC in clinic in the L UE)  -      User Key  (r) = Recorded By, (t) = Taken By, (c) = Cosigned By    Initials Name Provider Type    LH Kelsey Martinez, PT Physical Therapist                      Therapy Education  Given: HEP, Symptoms/condition management, Pain management, Mobility training  Program: New  How Provided: Verbal, Demonstration, Written  Provided to: Patient  Level of Understanding: Teach back education performed, Verbalized, Demonstrated           PT OP Goals     Row Name 08/28/18 4775           PT Short Term Goals    STG 1 Patient will be independent with education for symptom management, joint protection and strategies to minimize stress on affected tissues  -     STG 1 Progress New  -     STG 2 Pt to improve L ankle ROM in DF=10 deg, PF=50 deg  -     STG 2 Progress New  [facility]        Long Term Goals    LTG 1 Pt to improve ankle strength to 5/5  -     LTG 1 Progress New  -     LTG 2 Pt to improve FAAM from 31% to 70% for overall improvement  -     LTG 2 Progress New  -     LTG 3 Pt to improve L ankle ROM in DF=15 deg for improved gait pattern  -     LTG 3 Progress New  -     LTG 4 Patient will negotiate stairs reciprocally with rail support as needed  -     LTG 4 Progress New  -     LTG 5 Pt to be independent with HEP for ROM, strength, and balance activities.  -     LTG 5 Progress New  [facility]        Time Calculation    PT Goal Re-Cert Due Date 11/28/18  -       User Key  (r) = Recorded By, (t) = Taken By, (c) = Cosigned By    Initials Name Provider Type     Kelsey Martinez, PT Physical Therapist                PT Assessment/Plan     Row Name 08/28/18 1708          PT Assessment    Functional Limitations Impaired gait;Limitation in home management;Limitations in community activities;Performance in leisure activities;Limitations in functional capacity and performance  -     Impairments Balance;Gait;Impaired flexibility;Muscle strength;Pain;Range of motion  -     Assessment Comments Pt presents to therapy with a healing fracture of the L 5th MT after slipping on a rug in her home. She reports that she injured it further 3 weeks later. She has been slow to heal, but does have callus formation. She has decreased Vitamin D levels which has afftected her healing and progression. She has decreased ROM in the L ankle and strength. She is ambulating with mild antalgia due to decreased weight shift over the L LE and a mild LLD. Pt scored a 31% on the FAAM, with 100% being no  disabiltiy. Pt would benefit from therapy to address these issues to help her return to a normal painfree gait, improve ability to perform stairs, and to walk on uneven surfaces.   -     Please refer to paper survey for additional self-reported information Yes  -LH     Rehab Potential Good  -     Patient/caregiver participated in establishment of treatment plan and goals Yes  -     Patient would benefit from skilled therapy intervention Yes  -        PT Plan    PT Frequency 2x/week  -     Planned CPT's? PT EVAL MOD COMPLELITY: 84190;PT GAIT TRAINING EA 15 MIN: 89208;PT THER PROC EA 15 MIN: 90034;PT MANUAL THERAPY EA 15 MIN: 67036;PT NEUROMUSC RE-EDUCATION EA 15 MIN: 91058;PT HOT/COLD PACK WC NONMCARE: 18666  -     Physical Therapy Interventions (Optional Details) joint mobilization;patient/family education;manual therapy techniques;ROM (Range of Motion);modalities;stair training;strengthening;stretching;gait training;home exercise program  -     PT Plan Comments plan to see pt 2x week for ROM, strength, gait and pain reduction  -       User Key  (r) = Recorded By, (t) = Taken By, (c) = Cosigned By    Initials Name Provider Type     Kelsey Martinez, PT Physical Therapist                  Exercises     Row Name 08/28/18 1600             Total Minutes    43541 - Gait Training Minutes  3  -LH      23939 - PT Therapeutic Exercise Minutes 15  -LH         Exercise 1    Exercise Name 1 DF and PF  -LH      Sets 1 2  -      Reps 1 10  -LH      Additional Comments peach  -         Exercise 2    Exercise Name 2 inv and ev  -LH      Sets 2 2  -      Reps 2 10  -LH      Additional Comments peach  -         Exercise 3    Exercise Name 3 piriformis stretch on R LE  -LH      Sets 3 1  -      Reps 3 3  -      Time 3 20 sec  -         Exercise 4    Exercise Name 4 calf stretch on L  -LH      Sets 4 1  -      Reps 4 3  -LH      Time 4 20 sec   -LH        User Key  (r) = Recorded By, (t) = Taken By,  (c) = Cosigned By    Initials Name Provider Type     Kelsey Martinez, PT Physical Therapist                        Outcome Measure Options: FAAM  FAAM  FAAM: 31%      Time Calculation:     Therapy Suggested Charges     Code   Minutes Charges    10444 (CPT®) Hc Pt Neuromusc Re Education Ea 15 Min      59508 (CPT®) Hc Pt Ther Proc Ea 15 Min 15 1    12401 (CPT®) Hc Gait Training Ea 15 Min 3     80415 (CPT®) Hc Pt Therapeutic Act Ea 15 Min      08449 (CPT®) Hc Pt Manual Therapy Ea 15 Min      89121 (CPT®) Hc Pt Ther Massage- Per 15 Min      61866 (CPT®) Hc Pt Iontophoresis Ea 15 Min      75013 (CPT®) Hc Pt Elec Stim Ea-Per 15 Min      11148 (CPT®) Hc Pt Ultrasound Ea 15 Min      39793 (CPT®) Hc Pt Self Care/Mgmt/Train Ea 15 Min      72402 (CPT®) Hc Pt Prosthetic (S) Train Initial Encounter, Each 15 Min      29720 (CPT®) Hc Orthotic(S) Mgmt/Train Initial Encounter, Each 15min      93493 (CPT®) Hc Pt Aquatic Therapy Ea 15 Min      51309 (CPT®) Hc Pt Orthotic(S)/Prosthetic(S) Encounter, Each 15 Min      Total  18 1          Start Time: 1545  Stop Time: 1630  Time Calculation (min): 45 min  Total Timed Code Minutes- PT: 43 minute(s)     Therapy Charges for Today     Code Description Service Date Service Provider Modifiers Qty    74756700196 HC PT THER PROC EA 15 MIN 8/28/2018 Kelsey Martinez, PT GP 1    53054584899 HC PT EVAL MOD COMPLEXITY 2 8/28/2018 Kelsey Martinez, PT GP 1          PT G-Codes  Outcome Measure Options: MYLENE Martinez, PT  8/28/2018

## 2018-08-30 ENCOUNTER — HOSPITAL ENCOUNTER (OUTPATIENT)
Dept: PHYSICAL THERAPY | Facility: HOSPITAL | Age: 63
Setting detail: THERAPIES SERIES
Discharge: HOME | End: 2018-08-30
Attending: ORTHOPAEDIC SURGERY

## 2018-08-30 DIAGNOSIS — S92.355D CLOSED NONDISPLACED FRACTURE OF FIFTH METATARSAL BONE OF LEFT FOOT WITH ROUTINE HEALING, SUBSEQUENT ENCOUNTER: ICD-10-CM

## 2018-08-30 DIAGNOSIS — M79.672 LEFT FOOT PAIN: Primary | ICD-10-CM

## 2018-08-30 PROCEDURE — 97110 THERAPEUTIC EXERCISES: CPT

## 2018-09-10 ENCOUNTER — OFFICE VISIT (OUTPATIENT)
Dept: ORTHOPEDIC SURGERY | Facility: CLINIC | Age: 63
End: 2018-09-10

## 2018-09-10 VITALS — BODY MASS INDEX: 23.54 KG/M2 | TEMPERATURE: 97.7 F | WEIGHT: 150 LBS | HEIGHT: 67 IN

## 2018-09-10 DIAGNOSIS — M20.42 HAMMER TOE OF LEFT FOOT: ICD-10-CM

## 2018-09-10 DIAGNOSIS — S92.355D CLOSED NONDISPLACED FRACTURE OF FIFTH METATARSAL BONE OF LEFT FOOT WITH ROUTINE HEALING, SUBSEQUENT ENCOUNTER: Primary | ICD-10-CM

## 2018-09-10 DIAGNOSIS — M19.079 ARTHRITIS OF FOOT: ICD-10-CM

## 2018-09-10 DIAGNOSIS — E55.9 VITAMIN D DEFICIENCY: ICD-10-CM

## 2018-09-10 PROCEDURE — 73630 X-RAY EXAM OF FOOT: CPT | Performed by: ORTHOPAEDIC SURGERY

## 2018-09-10 PROCEDURE — 99214 OFFICE O/P EST MOD 30 MIN: CPT | Performed by: ORTHOPAEDIC SURGERY

## 2018-09-10 NOTE — PROGRESS NOTES
"Foot Follow Up      Patient: Cathy Gutiérrez    YOB: 1955 63 y.o. female    Chief Complaints: Foot feeling a little better    History of Present Illness: Patient follows up left foot injury from 4/15/18 with initial evaluation on 4/16/18.  She's also seen podiatry for fungal changes to her toe and is had an only mildly intermittent asymptomatic hammertoes really hasn't been bothering her at the second toe.    She's also been followed for vitamin D deficiency.  She was last seen on 8/16/18 with instructions to wean out of her boot to a postoperative shoe and has been doing as such without any pain along the distal fifth metatarsal.  She still gets some mild intermittent achiness over the base of the fifth metatarsal and dorsum of the midfoot.      HPI    ROS: Foot pain  Past Medical History:   Diagnosis Date   • Depression    • Disease of thyroid gland    • Hypertension      Physical Exam:   Vitals:    09/10/18 1411   Temp: 97.7 °F (36.5 °C)   TempSrc: Temporal Artery    Weight: 68 kg (150 lb)   Height: 170.2 cm (67\")     Well developed with normal mood.  On exam with minimal if any swelling to the left foot and no focal pain over the fifth metatarsal distally.  Very slight hammering of the second toe with no tenderness to palpation and passively correctable to neutral.  Minimal discomfort along the base of the fifth metatarsal and dorsum of the midfoot.      Radiology: 3 views left foot ordered to evaluate fracture alignment reviewed and compared with previous x-rays.  There is been no change in alignment to the fracture there is callus formation.  There is some arthritic change at the base of the fifth tarsal and at the third TMT joint.    Vitamin D 8/16/18 24.3 (down from 26.5 on 6/20/18)      Assessment/Plan:  1.  Left fifth metatarsal fracture  2.  Left second hammertoe  3.  Left midfoot arthritis  4.  Vitamin D deficiency    Regarding her vitamin D since 8/17/18 50,000 units twice weekly and " understands to have her level rechecked around 9/14/18.    Regarding the fifth metatarsal she's not really having any pain there and may start gradually weaning out of her postoperative shoe into an athletic shoe.  I would not recommend anything from a surgical standpoint at this time.      If at any point she has return of pain over the distal fifth metatarsal she get back into her boot and let me know.  She will continue with physical therapy and I demonstrated gentle heel cord and intrinsic stretching exercises for her to do this should help with her midfoot as well as her second toe as well.    I will see her back in 6 weeks' x-rays of her left foot

## 2018-09-11 ENCOUNTER — HOSPITAL ENCOUNTER (OUTPATIENT)
Dept: PHYSICAL THERAPY | Facility: HOSPITAL | Age: 63
Setting detail: THERAPIES SERIES
Discharge: HOME | End: 2018-09-11
Attending: ORTHOPAEDIC SURGERY

## 2018-09-11 DIAGNOSIS — M79.672 LEFT FOOT PAIN: Primary | ICD-10-CM

## 2018-09-11 DIAGNOSIS — S92.355D CLOSED NONDISPLACED FRACTURE OF FIFTH METATARSAL BONE OF LEFT FOOT WITH ROUTINE HEALING, SUBSEQUENT ENCOUNTER: ICD-10-CM

## 2018-09-11 PROCEDURE — 97110 THERAPEUTIC EXERCISES: CPT

## 2018-09-11 NOTE — THERAPY TREATMENT NOTE
Outpatient Physical Therapy Ortho Treatment Note  [facility]     Patient Name: Cathy Gutiérrez  : 1955  MRN: [id number]  Today's Date: 2018      Visit Date: 2018    Visit Dx:    ICD-10-CM ICD-9-CM   1. Left foot pain M79.672 729.5   2. Closed nondisplaced fracture of fifth metatarsal bone of left foot with routine healing, subsequent encounter S92.355D V54.19       Patient Active Problem List   Diagnosis   • Closed nondisplaced fracture of fifth left metatarsal bone   • Osteoarthritis of left thumb   • Onychomycosis   • Hammer toe of left foot   • Vitamin D deficiency        Past Medical History:   Diagnosis Date   • Depression    • Disease of thyroid gland    • Hypertension         Past Surgical History:   Procedure Laterality Date   • BREAST BIOPSY               PT Ortho     Row Name 18 6473       Subjective Comments    Subjective Comments Pt states went to MD yesterday and ok to wean from  rigid shoe.  -SI       Subjective Pain    Able to rate subjective pain? yes  -SI    Pre-Treatment Pain Level 0  -SI    Post-Treatment Pain Level 0  -SI    Subjective Pain Comment No c/o pain in foot but c/o right LBP and weakness in right leg. ( went to chiropractor)  -SI       MMT Left Lower Ext    Lt Ankle Plantarflexion MMT, Gross Movement (4+/5) good plus  -SI    Lt Ankle Dorsiflexion MMT, Gross Movement (4+/5) good plus  -SI    Lt Ankle Subtalar Inversion MMT, Gross Movement (4-/5) good minus  -SI    Lt Ankle Subtalar Eversion MMT, Gross Movement (4-/5) good minus  -SI       Balance Skills Training    SLS 20 seconds on right, 15 on left x 3  -SI       Gait/Stairs Assessment/Training    Comment (Gait/Stairs) Pt amb as well as can in rigid post-op style shoe...  -SI      User Key  (r) = Recorded By, (t) = Taken By, (c) = Cosigned By    Initials Name Provider Type    Lizzie Valerio PTA Physical Therapy Assistant                            PT Assessment/Plan     Row Name 18 6971           PT Assessment    Assessment Comments Pt to bring/wear a regular shoe next session for gait instruction and start weaning from post-op shoe  -SI       User Key  (r) = Recorded By, (t) = Taken By, (c) = Cosigned By    Initials Name Provider Type    Lizzie Valerio PTA Physical Therapy Assistant                    Exercises     Row Name 09/11/18 1700             Subjective Comments    Subjective Comments Pt states went to MD yesterday and ok to wean from  rigid shoe.  -SI         Subjective Pain    Able to rate subjective pain? yes  -SI      Pre-Treatment Pain Level 0  -SI      Post-Treatment Pain Level 0  -SI      Subjective Pain Comment No c/o pain in foot but c/o right LBP and weakness in right leg. ( went to chiropractor)  -SI         Total Minutes    30389 - PT Therapeutic Exercise Minutes 40  -SI         Exercise 1    Exercise Name 1 DF and PF peach  -SI      Sets 1 2  -SI      Reps 1 20  -SI         Exercise 2    Exercise Name 2 inv and ev peach   -SI      Sets 2 2  -SI      Reps 2 10  -SI         Exercise 3    Exercise Name 3 piriformis stretch on R LE  -SI      Sets 3 1  -SI      Reps 3 3  -SI      Time 3 20 sec  -SI         Exercise 4    Exercise Name 4 calf stretch on L  -SI      Sets 4 1  -SI      Reps 4 3  -SI      Time 4 20 sec   -SI         Exercise 5    Exercise Name 5 soleus stretch on le  -SI      Reps 5 2  -SI      Time 5 20  -SI        User Key  (r) = Recorded By, (t) = Taken By, (c) = Cosigned By    Initials Name Provider Type    Lizzie Valerio PTA Physical Therapy Assistant                             Therapy Education  Given: HEP, Symptoms/condition management, Edema management  Program: Progressed  How Provided: Verbal, Demonstration, Written  Provided to: Patient  Level of Understanding: Teach back education performed              Time Calculation:   Start Time: 1635  Stop Time: 1720  Time Calculation (min): 45 min  Total Timed Code Minutes- PT: 40 minute(s)  Therapy Suggested  Charges     Code   Minutes Charges    91913 (CPT®) Hc Pt Neuromusc Re Education Ea 15 Min      88811 (CPT®) Hc Pt Ther Proc Ea 15 Min 40 3    90263 (CPT®) Hc Gait Training Ea 15 Min      03167 (CPT®) Hc Pt Therapeutic Act Ea 15 Min      19409 (CPT®) Hc Pt Manual Therapy Ea 15 Min      69723 (CPT®) Hc Pt Ther Massage- Per 15 Min      34572 (CPT®) Hc Pt Iontophoresis Ea 15 Min      68234 (CPT®) Hc Pt Elec Stim Ea-Per 15 Min      19435 (CPT®) Hc Pt Ultrasound Ea 15 Min      03418 (CPT®) Hc Pt Self Care/Mgmt/Train Ea 15 Min      24591 (CPT®) Hc Pt Prosthetic (S) Train Initial Encounter, Each 15 Min      93422 (CPT®) Hc Orthotic(S) Mgmt/Train Initial Encounter, Each 15min      69096 (CPT®) Hc Pt Aquatic Therapy Ea 15 Min      78630 (CPT®) Hc Pt Orthotic(S)/Prosthetic(S) Encounter, Each 15 Min      Total  40 3        Therapy Charges for Today     Code Description Service Date Service Provider Modifiers Qty    55715279189 HC PT THER PROC EA 15 MIN 9/11/2018 Lizzie Christian, PTA GP 3                    Lizzie Christian, TERESO  9/11/2018

## 2018-09-13 ENCOUNTER — HOSPITAL ENCOUNTER (OUTPATIENT)
Dept: PHYSICAL THERAPY | Facility: HOSPITAL | Age: 63
Discharge: HOME | End: 2018-09-13
Admitting: ORTHOPAEDIC SURGERY

## 2018-09-13 DIAGNOSIS — M79.672 LEFT FOOT PAIN: Primary | ICD-10-CM

## 2018-09-13 DIAGNOSIS — S92.355D CLOSED NONDISPLACED FRACTURE OF FIFTH METATARSAL BONE OF LEFT FOOT WITH ROUTINE HEALING, SUBSEQUENT ENCOUNTER: ICD-10-CM

## 2018-09-13 PROCEDURE — 97110 THERAPEUTIC EXERCISES: CPT

## 2018-09-13 NOTE — THERAPY DISCHARGE NOTE
"     Outpatient Physical Therapy Ortho Treatment Note/Discharge Summary  [facility]     Patient Name: Cathy Gutiérrez  : 1955  MRN: [id number]  Today's Date: 2018      Visit Date: 2018    Visit Dx:    ICD-10-CM ICD-9-CM   1. Left foot pain M79.672 729.5   2. Closed nondisplaced fracture of fifth metatarsal bone of left foot with routine healing, subsequent encounter S92.355D V54.19       Patient Active Problem List   Diagnosis   • Closed nondisplaced fracture of fifth left metatarsal bone   • Osteoarthritis of left thumb   • Onychomycosis   • Hammer toe of left foot   • Vitamin D deficiency        Past Medical History:   Diagnosis Date   • Depression    • Disease of thyroid gland    • Hypertension         Past Surgical History:   Procedure Laterality Date   • BREAST BIOPSY               PT Ortho     Row Name 18 1500       Subjective Comments    Subjective Comments Pt states she purchased new sneaTierPMs Diaz\" and feet feel great in them.  States got orthotics for her other shoes that likes to wear that have no arch support  -SI       Subjective Pain    Able to rate subjective pain? yes  -SI    Pre-Treatment Pain Level 0  -SI    Post-Treatment Pain Level 0  -SI    Subjective Pain Comment Min to no pain at left 5th metatarsal or lateral ankle.  C/O right back and leg pain, and hx of some sort of HS tear left  -SI       Posture/Observations    Observations --   no edema at foot or ankle  -SI       Right Lower Ext    Rt Ankle Dorsiflexion AROM 10  -SI    Rt Ankle Plantarflexion AROM 62  -SI    Rt Ankle Inversion AROM 40  -SI    Rt Ankle Eversion AROM 18  -SI       Left Lower Ext    Lt Ankle Dorsiflexion AROM 9  -SI    Lt Ankle Plantarflexion AROM 60  -SI    Lt Ankle Inversion AROM 40  -SI    Lt Ankle Eversion AROM 18  -SI       MMT Left Lower Ext    Lt Ankle Plantarflexion MMT, Gross Movement (4+/5) good plus  -SI    Lt Ankle Dorsiflexion MMT, Gross Movement (4+/5) good plus  -SI    Lt " Ankle Subtalar Inversion MMT, Gross Movement (4/5) good  -SI    Lt Ankle Subtalar Eversion MMT, Gross Movement (4/5) good  -SI       Balance Skills Training    SLS 20 seconds on either LE x 3  -SI       Gait/Stairs Assessment/Training    Distance in Feet (Gait) 100  -SI    Handrail Location (Stairs) left side (ascending)  -SI    Number of Steps (Stairs) 12  -SI    Ascending Technique (Stairs) step-over-step  -SI    Descending Technique (Stairs) step-to-step;step-over-step  -SI    Comment (Gait/Stairs) pt with mildly altered gait which looks more related to back and RLE issues.  She can do stairs reciprcally but told not to force it and alter her posture trying  -SI    Row Name 09/11/18 1700       Subjective Comments    Subjective Comments Pt states went to MD yesterday and ok to wean from  rigid shoe.  -SI       Subjective Pain    Able to rate subjective pain? yes  -SI    Pre-Treatment Pain Level 0  -SI    Post-Treatment Pain Level 0  -SI    Subjective Pain Comment No c/o pain in foot but c/o right LBP and weakness in right leg. ( went to chiropractor)  -SI       MMT Left Lower Ext    Lt Ankle Plantarflexion MMT, Gross Movement (4+/5) good plus  -SI    Lt Ankle Dorsiflexion MMT, Gross Movement (4+/5) good plus  -SI    Lt Ankle Subtalar Inversion MMT, Gross Movement (4-/5) good minus  -SI    Lt Ankle Subtalar Eversion MMT, Gross Movement (4-/5) good minus  -SI       Balance Skills Training    SLS 20 seconds on right, 15 on left x 3  -SI       Gait/Stairs Assessment/Training    Comment (Gait/Stairs) Pt amb as well as can in rigid post-op style shoe...  -SI      User Key  (r) = Recorded By, (t) = Taken By, (c) = Cosigned By    Initials Name Provider Type    SI Lizzie Christian, PTA Physical Therapy Assistant                            PT Assessment/Plan     Row Name 09/13/18 9435          PT Assessment    Assessment Comments Pt currently under care of Chiropractor for her back but considering request for Neurosurgical  "consult.....Pt now out of rigid post-op type shoe and walking feels better and much improved.  Pt feels ind with HEP.  May return to PT at some point for series of tx for LB...  -SI       User Key  (r) = Recorded By, (t) = Taken By, (c) = Cosigned By    Initials Name Provider Type    Lizzie Valerio PTA Physical Therapy Assistant                    Exercises     Row Name 09/13/18 1500             Subjective Comments    Subjective Comments Pt states she purchased new sneaCabifys Diaz\" and feet feel great in them.  States got orthotics for her other shoes that likes to wear that have no arch support  -SI         Subjective Pain    Able to rate subjective pain? yes  -SI      Pre-Treatment Pain Level 0  -SI      Post-Treatment Pain Level 0  -SI      Subjective Pain Comment Min to no pain at left 5th metatarsal or lateral ankle.  C/O right back and leg pain, and hx of some sort of HS tear left  -SI         Total Minutes    32489 - PT Therapeutic Exercise Minutes 40  -SI         Exercise 1    Exercise Name 1 low level heel lifts and toe lifts in standing  -SI      Sets 1 1  -SI      Reps 1 20  -SI         Exercise 2    Exercise Name 2 Bilat EV with orange latex free TB  -SI      Sets 2 3  -SI      Reps 2 10  -SI         Exercise 3    Exercise Name 3 SL stance with clock reach  -SI      Sets 3 1  -SI      Reps 3 3  -SI      Time 3 20 sec on each  -SI         Exercise 4    Exercise Name 4 calf stretch on L  -SI      Sets 4 1  -SI      Reps 4 3  -SI      Time 4 20 sec   -SI         Exercise 5    Exercise Name 5 soleus stretch on le  -SI      Reps 5 2  -SI      Time 5 20  -SI        User Key  (r) = Recorded By, (t) = Taken By, (c) = Cosigned By    Initials Name Provider Type    Lizzie Valerio PTA Physical Therapy Assistant                               PT OP Goals     Row Name 09/13/18 0500          PT Short Term Goals    STG 1 Patient will be independent with education for symptom management, joint protection and " strategies to minimize stress on affected tissues  -SI     STG 1 Progress Met  -SI     STG 2 Pt to improve L ankle ROM in DF=10 deg, PF=50 deg  -SI     STG 2 Progress Met  -SI        Long Term Goals    LTG 1 Pt to improve ankle strength to 5/5  -SI     LTG 1 Progress Progressing   should continue to progress with Ind HEP  -SI     LTG 2 Pt to improve FAAM from 31% to 70% for overall improvement  -SI     LTG 2 Progress --   not repeated  -SI     LTG 3 Pt to improve L ankle ROM in DF=15 deg for improved gait pattern  -SI     LTG 3 Progress Not Met   10 degrees active  -SI     LTG 4 Patient will negotiate stairs reciprocally with rail support as needed  -SI     LTG 4 Progress Met  -SI     LTG 5 Pt to be independent with HEP for ROM, strength, and balance activities.  -SI     LTG 5 Progress Met  -SI       User Key  (r) = Recorded By, (t) = Taken By, (c) = Cosigned By    Initials Name Provider Type    Lizzie Valerio, PTA Physical Therapy Assistant          Therapy Education  Given: HEP, Symptoms/condition management (discussed low impact type ex.)  Program: Progressed, Modified  How Provided: Verbal, Demonstration, Written  Provided to: Patient  Level of Understanding: Teach back education performed              Time Calculation:   Start Time: 1415  Stop Time: 1500  Time Calculation (min): 45 min  Total Timed Code Minutes- PT: 45 minute(s)  Therapy Suggested Charges     Code   Minutes Charges    32259 (CPT®) Hc Pt Neuromusc Re Education Ea 15 Min      22576 (CPT®) Hc Pt Ther Proc Ea 15 Min 40 3    78322 (CPT®) Hc Gait Training Ea 15 Min      46316 (CPT®) Hc Pt Therapeutic Act Ea 15 Min      79071 (CPT®) Hc Pt Manual Therapy Ea 15 Min      56115 (CPT®) Hc Pt Ther Massage- Per 15 Min      28523 (CPT®) Hc Pt Iontophoresis Ea 15 Min      35919 (CPT®) Hc Pt Elec Stim Ea-Per 15 Min      00597 (CPT®) Hc Pt Ultrasound Ea 15 Min      51999 (CPT®) Hc Pt Self Care/Mgmt/Train Ea 15 Min      08826 (CPT®) Hc Pt Prosthetic (S) Train  Initial Encounter, Each 15 Min      38991 (CPT®) Hc Orthotic(S) Mgmt/Train Initial Encounter, Each 15min      15960 (CPT®) Hc Pt Aquatic Therapy Ea 15 Min      19544 (CPT®) Hc Pt Orthotic(S)/Prosthetic(S) Encounter, Each 15 Min      Total  40 3        Therapy Charges for Today     Code Description Service Date Service Provider Modifiers Qty    80157794563 HC PT THER PROC EA 15 MIN 9/13/2018 Lizzie Christian, PTA GP 3                OP PT Discharge Summary  Date of Discharge: 09/13/18  Reason for Discharge: Independent  Outcomes Achieved: Patient able to partially acheive established goals  Discharge Destination: Home with home program  Discharge Instructions/Additional Comments: HEP every other day .      Lizzie Christian, PTA  9/13/2018

## 2018-10-22 ENCOUNTER — OFFICE VISIT (OUTPATIENT)
Dept: ORTHOPEDIC SURGERY | Facility: CLINIC | Age: 63
End: 2018-10-22

## 2018-10-22 VITALS — HEIGHT: 67 IN | WEIGHT: 142 LBS | BODY MASS INDEX: 22.29 KG/M2 | TEMPERATURE: 98.7 F

## 2018-10-22 DIAGNOSIS — M20.42 HAMMER TOE OF LEFT FOOT: ICD-10-CM

## 2018-10-22 DIAGNOSIS — M19.079 ARTHRITIS OF FOOT: ICD-10-CM

## 2018-10-22 DIAGNOSIS — E55.9 VITAMIN D DEFICIENCY: ICD-10-CM

## 2018-10-22 DIAGNOSIS — S92.355D CLOSED NONDISPLACED FRACTURE OF FIFTH METATARSAL BONE OF LEFT FOOT WITH ROUTINE HEALING, SUBSEQUENT ENCOUNTER: Primary | ICD-10-CM

## 2018-10-22 PROCEDURE — 73630 X-RAY EXAM OF FOOT: CPT | Performed by: ORTHOPAEDIC SURGERY

## 2018-10-22 PROCEDURE — 99213 OFFICE O/P EST LOW 20 MIN: CPT | Performed by: ORTHOPAEDIC SURGERY

## 2018-10-22 NOTE — PROGRESS NOTES
"Foot Follow Up      Patient: Cathy Gutiérrez    YOB: 1955 63 y.o. female    Chief Complaints: Foot  feeling better    History of Present Illness:Patient follows up left foot injury from 4/15/18 with initial evaluation on 4/16/18.  She's also seen podiatry for fungal changes to her toe and is had an only mildly intermittent asymptomatic hammertoes really hasn't been bothering her at the second toe.     She's also been followed for vitamin D deficiency.  She was seen on 8/16/18 with instructions to wean out of her boot to a postoperative shoe and had been doing as such without any pain along the distal fifth metatarsal.   she was last seen on 9/10/18 and he had some mild intermittent achiness over the base of the fifth metatarsal and dorsum of the midfoot.    Instructions were  given to wean out of her postoperative shoe into an athletic shoe which she has done and has really no complaints of pain in the foot now other than some slight occasional achiness    She is also instructed to have her vitamin D level checked around 9/14/18 which she never did.  She states she has been taking an over-the-counter supplement of 2000 units daily  HPI    ROS: Foot pain  Past Medical History:   Diagnosis Date   • Depression    • Disease of thyroid gland    • Hypertension      Physical Exam:   Vitals:    10/22/18 1328   Temp: 98.7 °F (37.1 °C)   TempSrc: Temporal Artery    Weight: 64.4 kg (142 lb)   Height: 170.2 cm (67\")     Well developed with normal mood.  Nonantalgic gait in fashionable shoes today.  Very slight cockup deformity of the second toe but no tenderness palpation or instability and no hammering at the PIP joint today.  No focal tenderness along the left fifth metatarsal.  Neutral dorsiflexion a heel cord      Radiology: 3 views of the left foot ordered to evaluate fracture alignment reviewed and compared with previous x-rays.  There has been interval callus formation throughout the fracture site " without change in alignment.  There are chronic degenerative changes the first MTP joint after previous bunion correction done elsewhere      Assessment/Plan:  1.  Left fifth metatarsal fracture  2.  Left second hammertoe  3.  Left midfoot arthritis  4.  Vitamin D deficiency    Overall she is markedly improved and is not really having any significant symptoms to the fifth metatarsal and I could not recommend anything from a surgical standpoint.  Regarding the second hammertoe is not really bothersome to her and I recommended intrinsic stretching exercises to her and to do heel cord stretching exercises to help with the midfoot arthritis if she has any pain there.    Regarding her vitamin D,  her fracture is now healed and I recommend that she follow-up with her primary care physician for further dosing and monitoring of her vitamin D.    We had a pleasant visit and I will see her back as needed.    After finishing she says she's had some chronic problems with her back and wants to see a back specialist and I recommended that she see Dr Perla for this.

## 2018-11-06 ENCOUNTER — TRANSCRIBE ORDERS (OUTPATIENT)
Dept: ADMINISTRATIVE | Facility: HOSPITAL | Age: 63
End: 2018-11-06

## 2018-11-06 DIAGNOSIS — R01.1 SYSTOLIC MURMUR: Primary | ICD-10-CM

## 2018-11-09 ENCOUNTER — CONSULT (OUTPATIENT)
Dept: ORTHOPEDIC SURGERY | Facility: CLINIC | Age: 63
End: 2018-11-09

## 2018-11-09 VITALS — TEMPERATURE: 98.4 F | WEIGHT: 142 LBS | BODY MASS INDEX: 22.29 KG/M2 | HEIGHT: 67 IN

## 2018-11-09 DIAGNOSIS — M54.50 SPINE PAIN, LUMBAR: Primary | ICD-10-CM

## 2018-11-09 DIAGNOSIS — M25.551 PAIN OF RIGHT HIP JOINT: ICD-10-CM

## 2018-11-09 DIAGNOSIS — M48.061 SPINAL STENOSIS OF LUMBAR REGION, UNSPECIFIED WHETHER NEUROGENIC CLAUDICATION PRESENT: ICD-10-CM

## 2018-11-09 PROCEDURE — 72120 X-RAY BEND ONLY L-S SPINE: CPT | Performed by: ORTHOPAEDIC SURGERY

## 2018-11-09 PROCEDURE — 99214 OFFICE O/P EST MOD 30 MIN: CPT | Performed by: ORTHOPAEDIC SURGERY

## 2018-11-09 RX ORDER — LURASIDONE HYDROCHLORIDE 20 MG/1
TABLET, FILM COATED ORAL
Refills: 0 | COMMUNITY
Start: 2018-10-24 | End: 2018-11-28

## 2018-11-09 NOTE — PROGRESS NOTES
New patient or new problem visit    CC: Right hip pain    HPI: She complains of pain in the right buttock area which she states radiates to the knee and is associated with occasional numbness in the right foot.  She fell 2 years ago injuring her hamstring and while undergoing therapy for the hamstring developed back pain which improved with some chiropractic.  The right hip and buttock pain persist in the back pain is less at this point she also complains of knee pain.  No balance difficulties bowel or bladder complaints but notes she is walking with a limp.  I believe I saw her many years ago but we cannot find an office record to that effect.    PFSH: See attached.  He had a foot injury which was attended to by Dr. Garcia.    ROS: See attached    PE: Constitutional: Vital signs above-noted.  Awake, alert and oriented    Psychiatric: Affect and insight do not appear grossly disturbed.    Pulmonary: Breathing is unlabored, color is good.    Skin: Warm, dry and normal turgor    Cardiac:  pedal pulses intact.  No edema.    Eyesight and hearing appear adequate for examination purposes      Musculoskeletal:  There is no tenderness to percussion and palpation of the spine. Motion appears undisturbed.  Posture is minimally scoliotic but the left shoulder appears fairly elevated to coronal and sagittal inspection.    The skin about the area is not inspected.  There is no palpable or visible deformity.  There is no local spasm.       Neurologic:   Reflexes are 2+ and symmetrical in the patellae and achilles.   Motor function is undisturbed in quadriceps, EHL, and gastrocnemius      Sensation appears symmetrically intact to light touch   .  In the bilateral lower extremities there is no evidence of atrophy.   Clonus is absent..  Gait appears limping, almost Trendelenburg.  SLR test negative    Stinchfield test is negative.  Internal/external rotation of the hip causes no pain.  Knee is stable and shows full range of  motion without effusion.    XRAY: Plain film x-rays from last year of the lumbar spine show a slight scoliosis overall and demonstrate an L3 4 retrolisthesis were disc degeneration is concentrated and an otherwise fairly unremarkable picture.  There is loss of lordosis.  The hip is not depicted.  MRI scan of the lumbar spine shows degenerative changes but no significant stenosis.  I reviewed the radiologist's report thereof.  Flexion/Extension lumbar laterals obtained in my office today show no instability.  No prior flexion-extension films are available.    Other: n/a    Impression: I think that her knee pain is radiating or referred and I suspect that the entirety of this is coming from L3 to 4 nerve root the mediation.  There may be a dynamic element of compression at the L3 4 level where the retrolisthesis lies despite the negative flexion-extension films.  Indeed there is not much motion on extension and it may be that the pain or spasm are inhibiting the full motion.  I'm also wanting to look at her right hip.  Even though Stinchfield test was negative and there is no painful motion I want to make sure that she doesn't have a hip arthritis that could be referred to the knee and causing atypical buttock rather than groin pain.    Plan: I've schedule her for lumbar epidural steroid injection I'm going to call her and ask her to return to the office for right hip x-ray for good measure before going through the invasive procedure.  She can drop pinning get the hip x-ray done at any time during office hours that she finds convenient.    Addendum: 11/13/18 she returns today for right hip x-rays which are unremarkable.  No comparison views are available.  Schedule her epidural injections.

## 2018-11-09 NOTE — PROGRESS NOTES
Per ROS, he requested that the patient come back in one day just at her convenience to get a hip x-ray. It does not have to be a day when he is in the office, but you can put the order in his queue so that he can dictate it.

## 2018-11-12 ENCOUNTER — APPOINTMENT (OUTPATIENT)
Dept: CARDIOLOGY | Facility: HOSPITAL | Age: 63
End: 2018-11-12
Attending: INTERNAL MEDICINE

## 2018-11-14 DIAGNOSIS — M48.061 SPINAL STENOSIS OF LUMBAR REGION, UNSPECIFIED WHETHER NEUROGENIC CLAUDICATION PRESENT: Primary | ICD-10-CM

## 2018-11-19 ENCOUNTER — HOSPITAL ENCOUNTER (OUTPATIENT)
Dept: CARDIOLOGY | Facility: HOSPITAL | Age: 63
Discharge: HOME | End: 2018-11-19
Attending: INTERNAL MEDICINE | Admitting: INTERNAL MEDICINE

## 2018-11-19 DIAGNOSIS — R01.1 SYSTOLIC MURMUR: ICD-10-CM

## 2018-11-19 LAB
ASCENDING AORTA: 2.6 CM
AV HCM GRAD VALS: 9 MMHG
BH CV ECHO MEAS - ACS: 1.8 CM
BH CV ECHO MEAS - AO MAX PG (FULL): 2.5 MMHG
BH CV ECHO MEAS - AO MAX PG: 7.1 MMHG
BH CV ECHO MEAS - AO MEAN PG (FULL): 1.6 MMHG
BH CV ECHO MEAS - AO MEAN PG: 4 MMHG
BH CV ECHO MEAS - AO ROOT AREA (BSA CORRECTED): 1.5
BH CV ECHO MEAS - AO ROOT AREA: 5.8 CM^2
BH CV ECHO MEAS - AO ROOT DIAM: 2.7 CM
BH CV ECHO MEAS - AO V2 MAX: 132.9 CM/SEC
BH CV ECHO MEAS - AO V2 MEAN: 94.9 CM/SEC
BH CV ECHO MEAS - AO V2 VTI: 35.8 CM
BH CV ECHO MEAS - AVA(I,A): 1.8 CM^2
BH CV ECHO MEAS - AVA(I,D): 1.8 CM^2
BH CV ECHO MEAS - AVA(V,A): 2.1 CM^2
BH CV ECHO MEAS - AVA(V,D): 2.1 CM^2
BH CV ECHO MEAS - BSA(HAYCOCK): 1.8 M^2
BH CV ECHO MEAS - BSA: 1.8 M^2
BH CV ECHO MEAS - BZI_BMI: 23.2 KILOGRAMS/M^2
BH CV ECHO MEAS - BZI_METRIC_HEIGHT: 170.2 CM
BH CV ECHO MEAS - BZI_METRIC_WEIGHT: 67.1 KG
BH CV ECHO MEAS - EDV(MOD-SP4): 74 ML
BH CV ECHO MEAS - EDV(TEICH): 73.7 ML
BH CV ECHO MEAS - EF(CUBED): 81.3 %
BH CV ECHO MEAS - EF(MOD-BP): 62 %
BH CV ECHO MEAS - EF(MOD-SP4): 62.2 %
BH CV ECHO MEAS - EF(TEICH): 74.4 %
BH CV ECHO MEAS - ESV(MOD-SP4): 28 ML
BH CV ECHO MEAS - ESV(TEICH): 18.8 ML
BH CV ECHO MEAS - FS: 42.9 %
BH CV ECHO MEAS - IVS/LVPW: 1.1
BH CV ECHO MEAS - IVSD: 1.2 CM
BH CV ECHO MEAS - LAT PEAK E' VEL: 7 CM/SEC
BH CV ECHO MEAS - LV DIASTOLIC VOL/BSA (35-75): 41.6 ML/M^2
BH CV ECHO MEAS - LV MASS(C)D: 157.9 GRAMS
BH CV ECHO MEAS - LV MASS(C)DI: 88.8 GRAMS/M^2
BH CV ECHO MEAS - LV MAX PG: 4.6 MMHG
BH CV ECHO MEAS - LV MEAN PG: 2.4 MMHG
BH CV ECHO MEAS - LV SYSTOLIC VOL/BSA (12-30): 15.7 ML/M^2
BH CV ECHO MEAS - LV V1 MAX: 107.2 CM/SEC
BH CV ECHO MEAS - LV V1 MEAN: 71 CM/SEC
BH CV ECHO MEAS - LV V1 VTI: 24.7 CM
BH CV ECHO MEAS - LVIDD: 4.1 CM
BH CV ECHO MEAS - LVIDS: 2.3 CM
BH CV ECHO MEAS - LVLD AP4: 7 CM
BH CV ECHO MEAS - LVLS AP4: 6.1 CM
BH CV ECHO MEAS - LVOT AREA (M): 2.5 CM^2
BH CV ECHO MEAS - LVOT AREA: 2.7 CM^2
BH CV ECHO MEAS - LVOT DIAM: 1.8 CM
BH CV ECHO MEAS - LVPWD: 1.1 CM
BH CV ECHO MEAS - MED PEAK E' VEL: 6 CM/SEC
BH CV ECHO MEAS - MR MAX PG: 24.3 MMHG
BH CV ECHO MEAS - MR MAX VEL: 246.7 CM/SEC
BH CV ECHO MEAS - MV A DUR: 0.14 SEC
BH CV ECHO MEAS - MV A MAX VEL: 60.1 CM/SEC
BH CV ECHO MEAS - MV DEC SLOPE: 321.4 CM/SEC^2
BH CV ECHO MEAS - MV DEC TIME: 0.23 SEC
BH CV ECHO MEAS - MV E MAX VEL: 76.2 CM/SEC
BH CV ECHO MEAS - MV E/A: 1.3
BH CV ECHO MEAS - MV MAX PG: 4.6 MMHG
BH CV ECHO MEAS - MV MEAN PG: 1.4 MMHG
BH CV ECHO MEAS - MV P1/2T MAX VEL: 77.6 CM/SEC
BH CV ECHO MEAS - MV P1/2T: 70.7 MSEC
BH CV ECHO MEAS - MV V2 MAX: 107.7 CM/SEC
BH CV ECHO MEAS - MV V2 MEAN: 50.7 CM/SEC
BH CV ECHO MEAS - MV V2 VTI: 30.3 CM
BH CV ECHO MEAS - MVA P1/2T LCG: 2.8 CM^2
BH CV ECHO MEAS - MVA(P1/2T): 3.1 CM^2
BH CV ECHO MEAS - MVA(VTI): 2.2 CM^2
BH CV ECHO MEAS - PA ACC TIME: 0.19 SEC
BH CV ECHO MEAS - PA MAX PG (FULL): 0.99 MMHG
BH CV ECHO MEAS - PA MAX PG: 5.6 MMHG
BH CV ECHO MEAS - PA PR(ACCEL): -8.1 MMHG
BH CV ECHO MEAS - PA V2 MAX: 118.1 CM/SEC
BH CV ECHO MEAS - PULM A REVS DUR: 0.1 SEC
BH CV ECHO MEAS - PULM A REVS VEL: 31 CM/SEC
BH CV ECHO MEAS - PULM DIAS VEL: 50.4 CM/SEC
BH CV ECHO MEAS - PULM S/D: 1.1
BH CV ECHO MEAS - PULM SYS VEL: 55.3 CM/SEC
BH CV ECHO MEAS - PVA(V,A): 1.7 CM^2
BH CV ECHO MEAS - PVA(V,D): 1.7 CM^2
BH CV ECHO MEAS - QP/QS: 0.81
BH CV ECHO MEAS - RAP SYSTOLE: 3 MMHG
BH CV ECHO MEAS - RV MAX PG: 4.6 MMHG
BH CV ECHO MEAS - RV MEAN PG: 2.8 MMHG
BH CV ECHO MEAS - RV V1 MAX: 107.2 CM/SEC
BH CV ECHO MEAS - RV V1 MEAN: 79.8 CM/SEC
BH CV ECHO MEAS - RV V1 VTI: 28.1 CM
BH CV ECHO MEAS - RVOT AREA: 1.9 CM^2
BH CV ECHO MEAS - RVOT DIAM: 1.5 CM
BH CV ECHO MEAS - RVSP: 21 MMHG
BH CV ECHO MEAS - SI(AO): 116.7 ML/M^2
BH CV ECHO MEAS - SI(CUBED): 31.2 ML/M^2
BH CV ECHO MEAS - SI(LVOT): 36.9 ML/M^2
BH CV ECHO MEAS - SI(MOD-SP4): 25.9 ML/M^2
BH CV ECHO MEAS - SI(TEICH): 30.8 ML/M^2
BH CV ECHO MEAS - SUP REN AO DIAM: 1.5 CM
BH CV ECHO MEAS - SV(AO): 207.6 ML
BH CV ECHO MEAS - SV(CUBED): 55.5 ML
BH CV ECHO MEAS - SV(LVOT): 65.6 ML
BH CV ECHO MEAS - SV(MOD-SP4): 46 ML
BH CV ECHO MEAS - SV(RVOT): 53 ML
BH CV ECHO MEAS - SV(TEICH): 54.8 ML
BH CV ECHO MEAS - TR MAX VEL: 212.8 CM/SEC
BH CV ECHO MEASUREMENTS AVERAGE E/E' RATIO: 11.72
BH CV XLRA - RV BASE: 2.7 CM
BH CV XLRA - TDI S': 13 CM/SEC
LEFT ATRIUM VOLUME INDEX: 34 ML/M2
SINUS: 2.7 CM
STJ: 2.5 CM

## 2018-11-19 PROCEDURE — 93306 TTE W/DOPPLER COMPLETE: CPT | Performed by: INTERNAL MEDICINE

## 2018-11-19 PROCEDURE — 93306 TTE W/DOPPLER COMPLETE: CPT

## 2018-11-28 ENCOUNTER — ANESTHESIA EVENT (OUTPATIENT)
Dept: PAIN MEDICINE | Facility: HOSPITAL | Age: 63
End: 2018-11-28

## 2018-11-28 ENCOUNTER — HOSPITAL ENCOUNTER (OUTPATIENT)
Dept: GENERAL RADIOLOGY | Facility: HOSPITAL | Age: 63
Discharge: HOME | End: 2018-11-28

## 2018-11-28 ENCOUNTER — ANESTHESIA (OUTPATIENT)
Dept: PAIN MEDICINE | Facility: HOSPITAL | Age: 63
End: 2018-11-28

## 2018-11-28 ENCOUNTER — HOSPITAL ENCOUNTER (OUTPATIENT)
Dept: PAIN MEDICINE | Facility: HOSPITAL | Age: 63
Discharge: HOME | End: 2018-11-28
Attending: ORTHOPAEDIC SURGERY | Admitting: ORTHOPAEDIC SURGERY

## 2018-11-28 VITALS
OXYGEN SATURATION: 98 % | BODY MASS INDEX: 22.76 KG/M2 | WEIGHT: 145 LBS | HEIGHT: 67 IN | SYSTOLIC BLOOD PRESSURE: 149 MMHG | RESPIRATION RATE: 16 BRPM | TEMPERATURE: 97.9 F | DIASTOLIC BLOOD PRESSURE: 79 MMHG | HEART RATE: 65 BPM

## 2018-11-28 DIAGNOSIS — M48.061 SPINAL STENOSIS OF LUMBAR REGION, UNSPECIFIED WHETHER NEUROGENIC CLAUDICATION PRESENT: ICD-10-CM

## 2018-11-28 DIAGNOSIS — M51.36 DDD (DEGENERATIVE DISC DISEASE), LUMBAR: Primary | ICD-10-CM

## 2018-11-28 DIAGNOSIS — R52 PAIN: ICD-10-CM

## 2018-11-28 PROCEDURE — 25010000002 METHYLPREDNISOLONE PER 80 MG: Performed by: ANESTHESIOLOGY

## 2018-11-28 PROCEDURE — 25010000002 MIDAZOLAM PER 1 MG: Performed by: ANESTHESIOLOGY

## 2018-11-28 PROCEDURE — 77003 FLUOROGUIDE FOR SPINE INJECT: CPT

## 2018-11-28 PROCEDURE — C1755 CATHETER, INTRASPINAL: HCPCS

## 2018-11-28 RX ORDER — SODIUM CHLORIDE 0.9 % (FLUSH) 0.9 %
1-10 SYRINGE (ML) INJECTION AS NEEDED
Status: DISCONTINUED | OUTPATIENT
Start: 2018-11-28 | End: 2018-11-29 | Stop reason: HOSPADM

## 2018-11-28 RX ORDER — LIDOCAINE HYDROCHLORIDE 10 MG/ML
1 INJECTION, SOLUTION INFILTRATION; PERINEURAL ONCE AS NEEDED
Status: DISCONTINUED | OUTPATIENT
Start: 2018-11-28 | End: 2018-11-29 | Stop reason: HOSPADM

## 2018-11-28 RX ORDER — FENTANYL CITRATE 50 UG/ML
50 INJECTION, SOLUTION INTRAMUSCULAR; INTRAVENOUS AS NEEDED
Status: DISCONTINUED | OUTPATIENT
Start: 2018-11-28 | End: 2018-11-29 | Stop reason: HOSPADM

## 2018-11-28 RX ORDER — METHYLPREDNISOLONE ACETATE 80 MG/ML
80 INJECTION, SUSPENSION INTRA-ARTICULAR; INTRALESIONAL; INTRAMUSCULAR; SOFT TISSUE ONCE
Status: COMPLETED | OUTPATIENT
Start: 2018-11-28 | End: 2018-11-28

## 2018-11-28 RX ORDER — LIDOCAINE HYDROCHLORIDE 10 MG/ML
0.5 INJECTION, SOLUTION INFILTRATION; PERINEURAL ONCE AS NEEDED
Status: DISCONTINUED | OUTPATIENT
Start: 2018-11-28 | End: 2018-11-29 | Stop reason: HOSPADM

## 2018-11-28 RX ORDER — SODIUM CHLORIDE 0.9 % (FLUSH) 0.9 %
3-10 SYRINGE (ML) INJECTION AS NEEDED
Status: DISCONTINUED | OUTPATIENT
Start: 2018-11-28 | End: 2018-11-29 | Stop reason: HOSPADM

## 2018-11-28 RX ORDER — SODIUM CHLORIDE 0.9 % (FLUSH) 0.9 %
3 SYRINGE (ML) INJECTION EVERY 12 HOURS SCHEDULED
Status: DISCONTINUED | OUTPATIENT
Start: 2018-11-28 | End: 2018-11-29 | Stop reason: HOSPADM

## 2018-11-28 RX ORDER — MIDAZOLAM HYDROCHLORIDE 1 MG/ML
1 INJECTION INTRAMUSCULAR; INTRAVENOUS AS NEEDED
Status: DISCONTINUED | OUTPATIENT
Start: 2018-11-28 | End: 2018-11-29 | Stop reason: HOSPADM

## 2018-11-28 RX ADMIN — METHYLPREDNISOLONE ACETATE 80 MG: 80 INJECTION, SUSPENSION INTRA-ARTICULAR; INTRALESIONAL; INTRAMUSCULAR; SOFT TISSUE at 08:45

## 2018-11-28 RX ADMIN — MIDAZOLAM HYDROCHLORIDE 2 MG: 2 INJECTION, SOLUTION INTRAMUSCULAR; INTRAVENOUS at 08:40

## 2018-11-28 NOTE — H&P
CHIEF COMPLAINT: Low back and right lower extremity pain      HISTORY OF PRESENT ILLNESS:  Since last July she is complained of low lumbar back pain which radiates posterolaterally down the right lower extremity to the shin.  She also is having increased pain in her right knee.  Constant in timing.  Between 7 and 9 out of 10 on the pain scale.  Aching sharp stabbing deep in nature.  Aggravated by sitting and laying down and constantly there.  She has had massage and chiropractics with some relief.  She is also tried Aleve and ice for over 6 weeks.  Probably see her while she sleeps.  It's affecting her driving exercise and activities of daily living.  She does notice weakness when she is walking and increased pain.  Her MRI shows degeneration    PAST MEDICAL HISTORY:  Current Outpatient Medications on File Prior to Encounter   Medication Sig Dispense Refill   • amphetamine-dextroamphetamine (ADDERALL) 10 MG tablet Take 2 tablets by mouth Daily.  0   • busPIRone (BUSPAR) 15 MG tablet   0   • estradiol (MINIVELLE, VIVELLE-DOT) 0.1 MG/24HR patch   0   • irbesartan (AVAPRO) 300 MG tablet   0   • lamoTRIgine (LaMICtal) 100 MG tablet   0   • LamoTRIgine  MG tablet sustained-release 24 hour   0   • LATUDA 20 MG tablet tablet   0   • lisinopril (PRINIVIL) 10 MG tablet Take 10 mg by mouth Daily.     • SYNTHROID 125 MCG tablet Take 125 mcg by mouth Every Morning.  0   • vitamin D (ERGOCALCIFEROL) 81944 units capsule capsule Take 1 capsule by mouth Every 7 (Seven) Days. Take Monday and thursday 4 capsule 1   • zolpidem (AMBIEN) 10 MG tablet   0     No current facility-administered medications on file prior to encounter.        Past Medical History:   Diagnosis Date   • Depression    • Disease of thyroid gland    • Hypertension          SOCIAL HISTORY:  No tobacco    REVIEW OF SYSTEMS:  No hematologic infectious or constitutional symptoms  Other review of systems non-contributory    PHYSICAL EXAM:  There were no vitals  "taken for this visit.   /79 (BP Location: Left arm, Patient Position: Lying)   Pulse 81   Temp 36.6 °C (97.9 °F) (Oral)   Resp 16   Ht 170.2 cm (67\")   Wt 65.8 kg (145 lb)   SpO2 97%   BMI 22.71 kg/m²     Well-developed well-nourished no acute distress  Extra ocular movements intact  Mallampati class 2 airway  Cardiac:  Regular rate and rhythm  Lungs:  Clear to auscultation bilaterally with good effort  Alert and oriented ×3  Deep tendon reflexes normal in the bilateral patella  Negative straight leg raise on the left, positive on the right  5 out of 5 strength bilateral upper and lower extremities  Lumbar spine without obvious deformities ecchymoses  Lumbar spine nontender to palpation      DIAGNOSIS:  Post-Op Diagnosis Codes:     * Lumbar degenerative disc disease [M51.36]     * Lumbar neuritis [M54.16]    PLAN:  1.  Lumbar 3 epidural steroid injections, up to 3, spaced 1-2 weeks apart.  If pain control is acceptable after 1 or 2 injections, it was discussed with the patient that they may return for the subsequent injections if and when their pain returns.  The risks were discussed with the patient including failure of relief, worsening pain, Headache (post dural puncture headache), bleeding (epidural hematoma) and infection (epidural abscess or skin infection).  2.  Physical therapy exercises at home as prescribed by physical therapy or from the pain clinic handout (given to the patient).  Continuation of these exercises every day, or multiple times per week, even when the patient has good pain relief, was stressed to the patient as a preventative measure to decrease the frequency and severity of future pain episodes.  3.  Continue pain medicines as already prescribed.  If patient not currently taking any, it is recommended to begin Acetaminophen 1000 mg po q 8 hours.  If other medicines containing Acetaminophen are currently prescribed, maintain daily dose at 3000 mg.    4.  If they can tolerate " NSAIDS, it is recommended to take Ibuprofen 600 mg po q 6 hours for 7 days during pain exacerbations.  Alternatively, they may substitute an NSAID of their choice (e.g. Aleve).  This may be taken at the same time as Acetaminophen.  5.  Heat and ice to the affected area as tolerated for pain control.  It was discussed that heating pads can cause burns.  6.  Daily low impact exercise such as walking or water exercise was recommended to maintain overall health and aid in weight control.   7.  Follow up as needed for subsequent injections.  8.  Patient was counseled to abstain from tobacco products.

## 2018-11-28 NOTE — ANESTHESIA PROCEDURE NOTES
PAIN Epidural block      Patient reassessed immediately prior to procedure    Patient location during procedure: pain clinic  Start Time: 11/28/2018 8:36 AM  Stop Time: 11/28/2018 8:52 AM  Indication:procedure for pain  Performed By  Anesthesiologist: Ed Foy MD  Preanesthetic Checklist  Completed: patient identified and risks and benefits discussed  Additional Notes  Diagnosis:     Post-Op Diagnosis Codes:     * Lumbar degenerative disc disease (M51.36)     * Lumbar neuritis (M54.16)    Sedation:  Versed 2mg    A lumbar epidural steroid injection with fluoroscopic guidance was performed.  Under fluoroscopic guidance, the epidural space was identified and accessed, confirmed by loss of resistance to saline.  The above medications were injected uneventfully.    Prep:  Pt Position:prone  Sterile Tech:cap, gloves, mask and sterile barrier  Prep:chlorhexidine gluconate and isopropyl alcohol  Monitoring:blood pressure monitoring, continuous pulse oximetry and EKG  Procedure:  Sedation: yes   Approach:right paramedian  Guidance: fluoroscopy  Location:lumbar  Level:3-4  Needle Type:Tuohy  Needle Gauge:20  Aspiration:negative  Medications:  Depomedrol:80  Preservative Free Saline:1mL    Post Assessment:  Pt Tolerance:patient tolerated the procedure well with no apparent complications  Complications:no

## 2018-12-26 ENCOUNTER — TRANSCRIBE ORDERS (OUTPATIENT)
Dept: ADMINISTRATIVE | Facility: HOSPITAL | Age: 63
End: 2018-12-26

## 2018-12-26 DIAGNOSIS — R94.31 ABNORMAL EKG: Primary | ICD-10-CM

## 2018-12-31 ENCOUNTER — TRANSCRIBE ORDERS (OUTPATIENT)
Dept: ADMINISTRATIVE | Facility: HOSPITAL | Age: 63
End: 2018-12-31

## 2018-12-31 DIAGNOSIS — R55 NEAR SYNCOPE: Primary | ICD-10-CM

## 2019-01-08 ENCOUNTER — HOSPITAL ENCOUNTER (OUTPATIENT)
Dept: CARDIOLOGY | Facility: HOSPITAL | Age: 64
Discharge: HOME | End: 2019-01-08
Attending: INTERNAL MEDICINE | Admitting: INTERNAL MEDICINE

## 2019-01-08 VITALS
HEART RATE: 77 BPM | WEIGHT: 145 LBS | SYSTOLIC BLOOD PRESSURE: 132 MMHG | DIASTOLIC BLOOD PRESSURE: 70 MMHG | HEIGHT: 67 IN | BODY MASS INDEX: 22.76 KG/M2

## 2019-01-08 DIAGNOSIS — R94.31 ABNORMAL EKG: ICD-10-CM

## 2019-01-08 LAB
BH CV ECHO MEAS - BSA(HAYCOCK): 1.8 M^2
BH CV ECHO MEAS - BSA: 1.8 M^2
BH CV ECHO MEAS - BZI_BMI: 22.7 KILOGRAMS/M^2
BH CV ECHO MEAS - BZI_METRIC_HEIGHT: 170.2 CM
BH CV ECHO MEAS - BZI_METRIC_WEIGHT: 65.8 KG
BH CV ECHO MEAS - EDV(MOD-SP4): 57 ML
BH CV ECHO MEAS - EDV(TEICH): 69.8 ML
BH CV ECHO MEAS - EF(CUBED): 81.7 %
BH CV ECHO MEAS - EF(MOD-BP): 68 %
BH CV ECHO MEAS - EF(MOD-SP4): 68.4 %
BH CV ECHO MEAS - EF(TEICH): 75 %
BH CV ECHO MEAS - ESV(MOD-SP4): 18 ML
BH CV ECHO MEAS - ESV(TEICH): 17.5 ML
BH CV ECHO MEAS - IVS/LVPW: 1
BH CV ECHO MEAS - IVSD: 1.2 CM
BH CV ECHO MEAS - LV DIASTOLIC VOL/BSA (35-75): 32.3 ML/M^2
BH CV ECHO MEAS - LV MASS(C)D: 155.5 GRAMS
BH CV ECHO MEAS - LV MASS(C)DI: 88.2 GRAMS/M^2
BH CV ECHO MEAS - LV SYSTOLIC VOL/BSA (12-30): 10.2 ML/M^2
BH CV ECHO MEAS - LVIDD: 4 CM
BH CV ECHO MEAS - LVIDS: 2.3 CM
BH CV ECHO MEAS - LVLD AP4: 6.9 CM
BH CV ECHO MEAS - LVLS AP4: 6.2 CM
BH CV ECHO MEAS - LVPWD: 1.2 CM
BH CV ECHO MEAS - SI(CUBED): 29.5 ML/M^2
BH CV ECHO MEAS - SI(MOD-SP4): 22.1 ML/M^2
BH CV ECHO MEAS - SI(TEICH): 29.7 ML/M^2
BH CV ECHO MEAS - SV(CUBED): 52.1 ML
BH CV ECHO MEAS - SV(MOD-SP4): 39 ML
BH CV ECHO MEAS - SV(TEICH): 52.3 ML
BH CV STRESS BP STAGE 1: NORMAL
BH CV STRESS BP STAGE 2: NORMAL
BH CV STRESS DURATION MIN STAGE 1: 3
BH CV STRESS DURATION MIN STAGE 2: 3
BH CV STRESS DURATION MIN STAGE 3: 0
BH CV STRESS DURATION SEC STAGE 1: 0
BH CV STRESS DURATION SEC STAGE 2: 0
BH CV STRESS DURATION SEC STAGE 3: 30
BH CV STRESS GRADE STAGE 1: 10
BH CV STRESS GRADE STAGE 2: 12
BH CV STRESS GRADE STAGE 3: 14
BH CV STRESS HR STAGE 1: 115
BH CV STRESS HR STAGE 2: 140
BH CV STRESS HR STAGE 3: 140
BH CV STRESS METS STAGE 1: 5
BH CV STRESS METS STAGE 2: 7.5
BH CV STRESS METS STAGE 3: 10
BH CV STRESS PROTOCOL 1: NORMAL
BH CV STRESS SPEED STAGE 1: 1.7
BH CV STRESS SPEED STAGE 2: 2.5
BH CV STRESS SPEED STAGE 3: 3.4
BH CV STRESS STAGE 1: 1
BH CV STRESS STAGE 2: 2
BH CV STRESS STAGE 3: 3
LV EF 2D ECHO EST: 68 %
MAXIMAL PREDICTED HEART RATE: 157 BPM
PERCENT MAX PREDICTED HR: 89.17 %
STRESS BASELINE BP: NORMAL MMHG
STRESS BASELINE HR: 79 BPM
STRESS PERCENT HR: 105 %
STRESS POST ESTIMATED WORKLOAD: 8 METS
STRESS POST EXERCISE DUR MIN: 6 MIN
STRESS POST EXERCISE DUR SEC: 30 SEC
STRESS POST PEAK BP: NORMAL MMHG
STRESS POST PEAK HR: 140 BPM
STRESS TARGET HR: 133 BPM

## 2019-01-08 PROCEDURE — 93016 CV STRESS TEST SUPVJ ONLY: CPT | Performed by: INTERNAL MEDICINE

## 2019-01-08 PROCEDURE — 93017 CV STRESS TEST TRACING ONLY: CPT

## 2019-01-08 PROCEDURE — 93350 STRESS TTE ONLY: CPT | Performed by: INTERNAL MEDICINE

## 2019-01-08 PROCEDURE — 93018 CV STRESS TEST I&R ONLY: CPT | Performed by: INTERNAL MEDICINE

## 2019-01-08 PROCEDURE — 93350 STRESS TTE ONLY: CPT

## 2019-01-10 ENCOUNTER — TRANSCRIBE ORDERS (OUTPATIENT)
Dept: ADMINISTRATIVE | Facility: HOSPITAL | Age: 64
End: 2019-01-10

## 2019-01-16 ENCOUNTER — HOSPITAL ENCOUNTER (OUTPATIENT)
Dept: CARDIOLOGY | Facility: HOSPITAL | Age: 64
Discharge: HOME | End: 2019-01-16
Attending: INTERNAL MEDICINE | Admitting: INTERNAL MEDICINE

## 2019-01-16 DIAGNOSIS — R55 NEAR SYNCOPE: ICD-10-CM

## 2019-01-16 PROCEDURE — 0296T HC EXT ECG > 48HR TO 21 DAY RCRD W/CONECT INTL RCRD: CPT

## 2019-01-17 ENCOUNTER — TELEPHONE (OUTPATIENT)
Dept: URGENT CARE | Facility: CLINIC | Age: 64
End: 2019-01-17

## 2019-01-17 DIAGNOSIS — J20.9 ACUTE BRONCHITIS, UNSPECIFIED ORGANISM: Primary | ICD-10-CM

## 2019-01-17 RX ORDER — ALBUTEROL SULFATE 90 UG/1
2 AEROSOL, METERED RESPIRATORY (INHALATION) EVERY 4 HOURS PRN
Qty: 1 INHALER | Refills: 0 | Status: SHIPPED | OUTPATIENT
Start: 2019-01-17 | End: 2020-12-28

## 2019-01-25 ENCOUNTER — TRANSCRIBE ORDERS (OUTPATIENT)
Dept: ADMINISTRATIVE | Facility: HOSPITAL | Age: 64
End: 2019-01-25

## 2019-01-25 DIAGNOSIS — R13.10 DYSPHAGIA, UNSPECIFIED TYPE: Primary | ICD-10-CM

## 2019-01-25 DIAGNOSIS — R05.3 CHRONIC COUGH: ICD-10-CM

## 2019-02-01 ENCOUNTER — APPOINTMENT (OUTPATIENT)
Dept: GENERAL RADIOLOGY | Facility: HOSPITAL | Age: 64
End: 2019-02-01
Attending: INTERNAL MEDICINE

## 2019-02-01 ENCOUNTER — APPOINTMENT (OUTPATIENT)
Dept: CT IMAGING | Facility: HOSPITAL | Age: 64
End: 2019-02-01
Attending: INTERNAL MEDICINE

## 2019-02-05 ENCOUNTER — HOSPITAL ENCOUNTER (OUTPATIENT)
Dept: CT IMAGING | Facility: HOSPITAL | Age: 64
Discharge: HOME | End: 2019-02-05
Attending: INTERNAL MEDICINE | Admitting: INTERNAL MEDICINE

## 2019-02-05 ENCOUNTER — HOSPITAL ENCOUNTER (OUTPATIENT)
Dept: GENERAL RADIOLOGY | Facility: HOSPITAL | Age: 64
Discharge: HOME | End: 2019-02-05
Attending: INTERNAL MEDICINE

## 2019-02-05 DIAGNOSIS — R13.10 DYSPHAGIA, UNSPECIFIED TYPE: ICD-10-CM

## 2019-02-05 DIAGNOSIS — R05.3 CHRONIC COUGH: ICD-10-CM

## 2019-02-05 PROCEDURE — 74220 X-RAY XM ESOPHAGUS 1CNTRST: CPT

## 2019-02-05 PROCEDURE — 71250 CT THORAX DX C-: CPT

## 2019-02-05 RX ADMIN — BARIUM SULFATE 700 MG: 700 TABLET ORAL at 08:30

## 2019-02-05 RX ADMIN — BARIUM SULFATE 135 ML: 980 POWDER, FOR SUSPENSION ORAL at 08:30

## 2019-02-05 RX ADMIN — ANTACID/ANTIFLATULENT 1 TABLET: 380; 550; 10; 10 GRANULE, EFFERVESCENT ORAL at 08:30

## 2019-02-05 RX ADMIN — BARIUM SULFATE 183 ML: 960 POWDER, FOR SUSPENSION ORAL at 08:30

## 2019-02-21 PROCEDURE — 0298T HOLTER MONITOR - 72 HOUR UP TO 21 DAY: CPT | Performed by: INTERNAL MEDICINE

## 2019-02-27 ENCOUNTER — OFFICE VISIT (OUTPATIENT)
Dept: SLEEP MEDICINE | Facility: HOSPITAL | Age: 64
End: 2019-02-27

## 2019-02-27 VITALS
WEIGHT: 154.4 LBS | HEART RATE: 73 BPM | SYSTOLIC BLOOD PRESSURE: 171 MMHG | OXYGEN SATURATION: 99 % | DIASTOLIC BLOOD PRESSURE: 80 MMHG | BODY MASS INDEX: 24.23 KG/M2 | HEIGHT: 67 IN

## 2019-02-27 DIAGNOSIS — G47.33 OBSTRUCTIVE SLEEP APNEA: Primary | ICD-10-CM

## 2019-02-27 PROCEDURE — 99203 OFFICE O/P NEW LOW 30 MIN: CPT | Performed by: FAMILY MEDICINE

## 2019-02-27 PROCEDURE — G0463 HOSPITAL OUTPT CLINIC VISIT: HCPCS

## 2019-02-27 NOTE — PROGRESS NOTES
[facility] New Patient/Consultation       Reason for Consultation: Obstructive sleep apnea history; has fallen out of treatment several years ago would like to get back into treatment.  Worsening of symptoms.      Patient Care Team:  Maria Teresa Ziegler MD as PCP - General (Internal Medicine)  Helga Vazquez MD as Consulting Physician (Sleep Medicine)      History of present illness:  Thank you for asking me to see your patient.  The patient is a 63 y.o. female diagnosed with mild obstructive sleep apnea 2012 AHI of 5.2.  Patient was put on auto CPAP.    Records it was not completely helping both her hypersomnia however today patient reports that it was helping a lot.  However other life stressors gotten away and she fell out of using it very consistently.  Since then has lost the machine on a move.  Is interested in getting back into treatment now.  Is having worsening of her obstructive sleep apnea symptoms including number of nighttime awakenings and not feeling very well rested when she wakes up in the morning.    Sleep Schedule:  Bed time: 11 PM to midnight  Sleep latency: 1 hour  Wake time: 8 AM and does not feel rested  Average hours slept: Off-and-on 8 hours  Number of naps per day: 1  Rotating shifts?:no  Nocturia: yes and 2-3x  Electronics in bedroom: no    Excessive daytime sleepiness or drowsiness: yes  Any accidents at work due to sleepiness in the last 5 years:no  Any difficulty driving due to sleepiness or being drowsy: no  Weight changed in the last 5 years:yes and 10lbs    Snoring:yes  Witnessed apneas:no  Have you ever awakened gasping for breath, coughing, choking or respiratory discomfort: yes  Morning headaches: no  Awaken with a sore throat or dry mouth: yes    Any reports of leg jerking at night: yes  Urge sensations: no  Does pain disrupt sleep: no  Sweating during sleep:no  Teeth grinding: yes    Any sudden episodes of sleep during the day: no  Sleep paralysis/hallucinations:  no  Muscle weakness with laughing/anger: no  Nightmares: no  Sleep walking: no    Are you sleepy when you increase your sleep time: yes  Do you sleep better away from your own bed: no    Review of Systems negative except for: Frequent urination, postnasal drip, anxiety with depression    Social History: Occasional cigarette use; 7 alcoholic drinks per week, 3 caffeinated beverages a day including coffee tea and soda; no recreational drug use    Allergies:  Latex    Family History: ZARA yes       Current Outpatient Medications:   •  albuterol sulfate  (90 Base) MCG/ACT inhaler, Inhale 2 puffs Every 4 (Four) Hours As Needed for Wheezing., Disp: 1 inhaler, Rfl: 0  •  amphetamine-dextroamphetamine (ADDERALL) 10 MG tablet, Take 2 tablets by mouth Daily., Disp: , Rfl: 0  •  azithromycin (ZITHROMAX Z-DON) 250 MG tablet, Take 2 tablets at the same time Day 1 and then 1 tablet every 24 hour thereafter., Disp: 6 tablet, Rfl: 1  •  busPIRone (BUSPAR) 15 MG tablet, , Disp: , Rfl: 0  •  Chlorcyclizine-Pseudoephed (STAHIST AD) 25-60 MG tablet, One tablet three times a day as needed for congestion, Disp: 30 tablet, Rfl: 0  •  estradiol (MINIVELLE, VIVELLE-DOT) 0.1 MG/24HR patch, , Disp: , Rfl: 0  •  guaiFENesin-codeine (ROMILAR-AC) 100-10 MG/5ML syrup, 5-10 cc's by mouth BID (naptime/bedtime) for cough, Disp: 150 mL, Rfl: 0  •  irbesartan (AVAPRO) 300 MG tablet, , Disp: , Rfl: 0  •  LamoTRIgine  MG tablet sustained-release 24 hour, , Disp: , Rfl: 0  •  LATUDA 20 MG tablet tablet, Take 10 mg by mouth Daily., Disp: , Rfl: 0  •  liothyronine (CYTOMEL) 5 MCG tablet, Take 5 mcg by mouth Daily., Disp: , Rfl:   •  lithium carbonate 150 MG capsule, Take 150 mg by mouth Daily., Disp: , Rfl: 0  •  MethylPREDNISolone (MEDROL) 4 MG tablet, follow package directions, Disp: 1 each, Rfl: 0  •  SYNTHROID 125 MCG tablet, Take 125 mcg by mouth Every Morning., Disp: , Rfl: 0  •  vitamin D (ERGOCALCIFEROL) 03471 units capsule capsule,  "Take 1 capsule by mouth Every 7 (Seven) Days. Take Monday and thursday, Disp: 4 capsule, Rfl: 1    Vital Signs:    Vitals:    02/27/19 1300   BP: 171/80   Pulse: 73   SpO2: 99%   Weight: 70 kg (154 lb 6.4 oz)   Height: 170.2 cm (67\")      Body mass index is 24.18 kg/m².  Neck Circumference: 14 inches      Physical Exam:   General Alert and oriented. No acute distress noted   Pharynx/Throat Class IV Mallampati airway, large tongue, no evidence of redundant lateral pharyngeal tissue. No oral lesions. No thrush. Moist mucous membranes.   Head Normocephalic. Symmetrical. Atraumatic.    Nose No septal deviation. No drainage   Chest Wall Normal shape. Symmetric expansion with respiration. No tenderness.   Neck Trachea midline, no thyromegaly or adenopathy    Lungs Clear to auscultation bilaterally. No wheezes. No rhonchi. No rales. Respirations regular, even and unlabored.   Heart Regular rhythm and normal rate. Normal S1 and S2. No murmur   Abdomen Soft, non-tender and non-distended. Normal bowel sounds. No masses.   Extremities Moves all extremities well. No edema   Psychiatric Normal mood and affect.       Impression:  1. Obstructive sleep apnea        Plan:    Good sleep hygiene measures should be maintained.      I discussed the pathophysiology of obstructive sleep apnea with the patient.  We discussed the adverse outcomes associated with untreated sleep-disordered breathing.  We discussed treatment modalities of obstructive sleep apnea including CPAP device as well as oral mandibular advancement device. Sleep study will be scheduled to establish definitive diagnosis of sleep disorder breathing.  Patient has gained about 10-13 pounds since her last sleep study.  Weight loss will be strongly beneficial in order to reduce the severity of sleep-disordered breathing.  Patient has narrow oropharyngeal structure.  Caution during activities that require prolonged concentration is strongly advised.  Patient will be notified " of sleep study results after sleep study is completed.  If sleep apnea is only mild,  oral mandibular advancement device may be one of the treatment options.  However if sleep apnea is moderately severe, CPAP treatment will be strongly encouraged.  The patient is not opposed to treatment with CPAP device if we confirm significant obstructive sleep apnea on polysomnography.    Thank you for allowing me to participate in your patient's care.    Helga Vazquez MD  Sleep Medicine  02/27/19  2:26 PM

## 2019-03-21 ENCOUNTER — HOSPITAL ENCOUNTER (OUTPATIENT)
Dept: SLEEP MEDICINE | Facility: HOSPITAL | Age: 64
Discharge: HOME | End: 2019-03-21
Admitting: FAMILY MEDICINE

## 2019-03-21 DIAGNOSIS — G47.33 OBSTRUCTIVE SLEEP APNEA: ICD-10-CM

## 2019-03-21 PROCEDURE — 95811 POLYSOM 6/>YRS CPAP 4/> PARM: CPT | Performed by: FAMILY MEDICINE

## 2019-03-21 PROCEDURE — 95811 POLYSOM 6/>YRS CPAP 4/> PARM: CPT

## 2019-03-29 ENCOUNTER — TRANSCRIBE ORDERS (OUTPATIENT)
Dept: ADMINISTRATIVE | Facility: HOSPITAL | Age: 64
End: 2019-03-29

## 2019-03-29 DIAGNOSIS — R91.1 LUNG NODULE: Primary | ICD-10-CM

## 2019-04-03 ENCOUNTER — TELEPHONE (OUTPATIENT)
Dept: SLEEP MEDICINE | Facility: HOSPITAL | Age: 64
End: 2019-04-03

## 2019-05-29 ENCOUNTER — APPOINTMENT (OUTPATIENT)
Dept: SLEEP MEDICINE | Facility: HOSPITAL | Age: 64
End: 2019-05-29

## 2019-06-12 ENCOUNTER — TELEPHONE (OUTPATIENT)
Dept: SLEEP MEDICINE | Facility: HOSPITAL | Age: 64
End: 2019-06-12

## 2019-06-12 NOTE — TELEPHONE ENCOUNTER
Patient called into sleep center today complaining of pap pressure intolerance. Patient stated she felt she was drowning in the pressure and was not able to use at all. Patient stated she had been workign with her DME N[facility] and per them recommended speaking with Dr about switching to auto settings instead of a set bi-level pressure. Tech informed patient that the Dr. Was in clinic today and would get a note to them and that a staff member would call her back with the Dr's recomendations. MAB

## 2019-06-17 ENCOUNTER — HOSPITAL ENCOUNTER (OUTPATIENT)
Dept: CT IMAGING | Facility: HOSPITAL | Age: 64
Discharge: HOME | End: 2019-06-17
Admitting: INTERNAL MEDICINE

## 2019-06-17 ENCOUNTER — DOCUMENTATION (OUTPATIENT)
Dept: SLEEP MEDICINE | Facility: HOSPITAL | Age: 64
End: 2019-06-17

## 2019-06-17 DIAGNOSIS — R91.1 LUNG NODULE: ICD-10-CM

## 2019-06-17 PROCEDURE — 71250 CT THORAX DX C-: CPT

## 2019-06-17 NOTE — PROGRESS NOTES
Patient called in complaining of pap therapy pressure issues. Forwarded notes to sleep  who informed staff to switch pt from a set bi-level pressure of 20/16cm to auto settings of Ipap [id number] epap min 4cm ps 4cm. Changes made in [facility] through patients active modem. MAB

## 2019-07-01 ENCOUNTER — TRANSCRIBE ORDERS (OUTPATIENT)
Dept: ADMINISTRATIVE | Facility: HOSPITAL | Age: 64
End: 2019-07-01

## 2019-07-01 DIAGNOSIS — R91.8 LUNG NODULES: Primary | ICD-10-CM

## 2019-07-03 ENCOUNTER — OFFICE VISIT (OUTPATIENT)
Dept: SLEEP MEDICINE | Facility: HOSPITAL | Age: 64
End: 2019-07-03

## 2019-07-03 VITALS
WEIGHT: 141.8 LBS | DIASTOLIC BLOOD PRESSURE: 77 MMHG | OXYGEN SATURATION: 98 % | HEART RATE: 62 BPM | SYSTOLIC BLOOD PRESSURE: 126 MMHG | HEIGHT: 67 IN | BODY MASS INDEX: 22.26 KG/M2

## 2019-07-03 DIAGNOSIS — G47.33 OBSTRUCTIVE SLEEP APNEA, ADULT: Primary | ICD-10-CM

## 2019-07-03 PROCEDURE — G0463 HOSPITAL OUTPT CLINIC VISIT: HCPCS

## 2019-07-03 PROCEDURE — 99213 OFFICE O/P EST LOW 20 MIN: CPT | Performed by: FAMILY MEDICINE

## 2019-07-03 NOTE — PROGRESS NOTES
Follow Up Sleep Disorders Center Note     Chief Complaint:  ZARA     Primary Care Physician: Maria Teresa Ziegler MD    Cathy Gutiérrez is a 64 y.o.female  was last seen at [facility] sleep lab: March 21, 2019 for an lab sleep study where she was found to have an AHI of 18.1 events per hour.  Patient was started on BiPAP 20/16 cmH2O.  On June 12 she did call the sleep study center about Pap pressure intolerance and felt like she was drowning.  Patient was changed to auto settings bilevel with IPAP max of 20 and EPAP minimum of 4 with pressure support of 4.  These changes were made per records on June 17, 2019. She presents today for follow-up.  She reports that she is tolerating the auto bilevel well.  She has not been using it the past week and a half however because she broke a tooth and she is having dry mouth with the BiPAP and is waiting for an implant for the broken tooth does not want to dry out the socket too much.  She now understands she can adjust the humidity setting on the machine as well.    Results Review:   Downloads between May 22, 2019 to June 20, 2019.  Average usage is 1 hour 24 minutes.  Compliance for at least 4 hours usage 0%.  Average AHI is 26.9.  Average AutoBiPAP pressure is 10.8/6.3 cm of water.    Patient is complaining of a dry mouth.  She has a full facemask.  It fits well.  Humidifier settings at 3.  Glasgow score today of 5.     Current Medications:    Current Outpatient Medications:   •  albuterol sulfate  (90 Base) MCG/ACT inhaler, Inhale 2 puffs Every 4 (Four) Hours As Needed for Wheezing., Disp: 1 inhaler, Rfl: 0  •  amphetamine-dextroamphetamine (ADDERALL) 10 MG tablet, Take 2 tablets by mouth Daily., Disp: , Rfl: 0  •  azithromycin (ZITHROMAX Z-DON) 250 MG tablet, Take 2 tablets at the same time Day 1 and then 1 tablet every 24 hour thereafter., Disp: 6 tablet, Rfl: 1  •  busPIRone (BUSPAR) 15 MG tablet, , Disp: , Rfl: 0  •  Chlorcyclizine-Pseudoephed (STAHIST AD) 25-60 MG  tablet, One tablet three times a day as needed for congestion, Disp: 30 tablet, Rfl: 0  •  estradiol (MINIVELLE, VIVELLE-DOT) 0.1 MG/24HR patch, , Disp: , Rfl: 0  •  guaiFENesin-codeine (ROMILAR-AC) 100-10 MG/5ML syrup, 5-10 cc's by mouth BID (naptime/bedtime) for cough, Disp: 150 mL, Rfl: 0  •  irbesartan (AVAPRO) 300 MG tablet, , Disp: , Rfl: 0  •  LamoTRIgine  MG tablet sustained-release 24 hour, , Disp: , Rfl: 0  •  LATUDA 20 MG tablet tablet, Take 10 mg by mouth Daily., Disp: , Rfl: 0  •  liothyronine (CYTOMEL) 5 MCG tablet, Take 5 mcg by mouth Daily., Disp: , Rfl:   •  lithium carbonate 150 MG capsule, Take 150 mg by mouth Daily., Disp: , Rfl: 0  •  MethylPREDNISolone (MEDROL) 4 MG tablet, follow package directions, Disp: 1 each, Rfl: 0  •  SYNTHROID 125 MCG tablet, Take 125 mcg by mouth Every Morning., Disp: , Rfl: 0  •  vitamin D (ERGOCALCIFEROL) 42520 units capsule capsule, Take 1 capsule by mouth Every 7 (Seven) Days. Take Monday and thursday, Disp: 4 capsule, Rfl: 1   also entered in Sleep Questionnaire    Patient  has a past medical history of Arthritis, Cancer (CMS/HCC), Depression, Disease of thyroid gland, GERD (gastroesophageal reflux disease), Hypertension, and Sleep apnea.    Social History:    Social History     Socioeconomic History   • Marital status:      Spouse name: Not on file   • Number of children: Not on file   • Years of education: Not on file   • Highest education level: Not on file   Tobacco Use   • Smoking status: Never Smoker   • Smokeless tobacco: Never Used   Substance and Sexual Activity   • Alcohol use: Yes   • Drug use: No       Allergies:  Latex    Review of Systems:    A complete review of systems was done and all were negative with the exception of reflux, anxiety, depression    Vital Signs:    Vitals:    07/03/19 1509   BP: 126/77   BP Location: Left arm   Patient Position: Sitting   Pulse: 62   SpO2: 98%   Weight: 64.3 kg (141 lb 12.8 oz)   Height: 170.2 cm  "(67\")     Body mass index is 22.21 kg/m².    Vital Signs /77 (BP Location: Left arm, Patient Position: Sitting)   Pulse 62   Ht 170.2 cm (67\")   Wt 64.3 kg (141 lb 12.8 oz)   SpO2 98%   BMI 22.21 kg/m²  Body mass index is 22.21 kg/m².    General Alert and oriented. No acute distress noted   Pharynx/Throat Class IV Mallampati airway, large tongue, no evidence of redundant lateral pharyngeal tissue. No oral lesions. No thrush. Moist mucous membranes.   Head Normocephalic. Symmetrical. Atraumatic.    Nose No septal deviation. No drainage   Chest Wall Normal shape. Symmetric expansion with respiration. No tenderness.   Neck Trachea midline, no thyromegaly or adenopathy    Lungs Clear to auscultation bilaterally. No wheezes. No rhonchi. No rales. Respirations regular, even and unlabored.   Heart Regular rhythm and normal rate. Normal S1 and S2. No murmur   Abdomen Soft, non-tender and non-distended. Normal bowel sounds. No masses.   Extremities Moves all extremities well. No edema   Psychiatric Normal mood and affect.     Impression:  1. Obstructive sleep apnea, adult        Patient will continue to use auto bilevel at current settings for the next 2 months and return to clinic for compliance check.  She will adjust the humidity setting to help with dry mouth.  She will confirm with her dentist that it is okay to use the BiPAP while waiting for the dental implant.  She does not want to affect the tooth socket.    Return to clinic in 2 months for follow-up or sooner if needed.    Caution during activities that require prolonged concentration is strongly advised if sleepiness returns. Changing of PAP supplies regularly is important for effective use. Patient needs to change cushion on the mask or plugs on nasal pillows along with disposable filters once every month and change mask frame, tubing, headgear and Velcro straps every 6 months at the minimum.    Helga Vazquez MD  Sleep Medicine  07/03/19  3:43 PM      "

## 2019-08-28 ENCOUNTER — OFFICE VISIT (OUTPATIENT)
Dept: GASTROENTEROLOGY | Facility: CLINIC | Age: 64
End: 2019-08-28

## 2019-08-28 VITALS
HEIGHT: 67 IN | DIASTOLIC BLOOD PRESSURE: 80 MMHG | SYSTOLIC BLOOD PRESSURE: 130 MMHG | BODY MASS INDEX: 22.13 KG/M2 | WEIGHT: 141 LBS | TEMPERATURE: 98.1 F

## 2019-08-28 DIAGNOSIS — R13.10 DYSPHAGIA, UNSPECIFIED TYPE: ICD-10-CM

## 2019-08-28 DIAGNOSIS — K21.9 GASTROESOPHAGEAL REFLUX DISEASE, ESOPHAGITIS PRESENCE NOT SPECIFIED: Primary | ICD-10-CM

## 2019-08-28 PROCEDURE — 99204 OFFICE O/P NEW MOD 45 MIN: CPT | Performed by: INTERNAL MEDICINE

## 2019-08-28 RX ORDER — OXCARBAZEPINE 150 MG/1
TABLET, FILM COATED ORAL
Refills: 0 | COMMUNITY
Start: 2019-06-25 | End: 2020-09-22

## 2019-08-28 RX ORDER — SODIUM CHLORIDE, SODIUM LACTATE, POTASSIUM CHLORIDE, CALCIUM CHLORIDE 600; 310; 30; 20 MG/100ML; MG/100ML; MG/100ML; MG/100ML
30 INJECTION, SOLUTION INTRAVENOUS CONTINUOUS
Status: CANCELLED | OUTPATIENT
Start: 2019-09-06

## 2019-08-28 RX ORDER — METHYLPHENIDATE HYDROCHLORIDE 10 MG/1
10 TABLET ORAL DAILY
Refills: 0 | COMMUNITY
Start: 2019-08-15 | End: 2020-12-28 | Stop reason: ALTCHOICE

## 2019-08-28 RX ORDER — LORAZEPAM 0.5 MG/1
0.5 TABLET ORAL DAILY PRN
Refills: 0 | COMMUNITY
Start: 2019-08-23 | End: 2021-02-22

## 2019-08-28 NOTE — PROGRESS NOTES
Chief Complaint   Patient presents with   • Heartburn   • Difficulty Swallowing        Cathy Gutiérrze is a  64 y.o. female here for an initial visit for GERD, dysphagia, history of polyps.    HPI this 64-year-old white female patient of Dr. Maria Teresa Ziegler presents with long-standing issues of reflux and dysphagia.  She has been studied in the past dating back to 2007 with upper endoscopy as well as video fluoroscopy swallow study.  She has been given instructions by the speech pathology service to improve her swallowing ability.  Upper endoscopy was last performed in December 2011 with a balloon dilation at that time.  She does carry history of colon polyps with an adenomatous polyp removed in 2007.  Follow-up examination in 2010 was normal as was in 2016.  She would be due for follow-up colonoscopy in 2021.  Recent esophagram in February of this year because of her swallowing difficulties revealed some spasticity in the thoracic and distal esophagus but also some thickening of the mucosal folds near the GE junction.  I have offered upper endoscopy at this time to better define this process.  She is amenable to this.    Past Medical History:   Diagnosis Date   • Arthritis    • Cancer (CMS/HCC)     throid   • Depression    • Disease of thyroid gland    • GERD (gastroesophageal reflux disease)    • Hypertension    • Sleep apnea        Current Outpatient Medications   Medication Sig Dispense Refill   • busPIRone (BUSPAR) 15 MG tablet   0   • estradiol (MINIVELLE, VIVELLE-DOT) 0.1 MG/24HR patch   0   • irbesartan (AVAPRO) 300 MG tablet   0   • LamoTRIgine  MG tablet sustained-release 24 hour   0   • LATUDA 20 MG tablet tablet Take 10 mg by mouth Daily.  0   • liothyronine (CYTOMEL) 5 MCG tablet Take 5 mcg by mouth Daily.     • LORazepam (ATIVAN) 0.5 MG tablet Take 0.5 mg by mouth Daily As Needed.  0   • methylphenidate (RITALIN) 10 MG tablet Take 10 mg by mouth Daily.  0   • OXcarbazepine (TRILEPTAL) 150 MG  tablet   0   • SYNTHROID 125 MCG tablet Take 125 mcg by mouth Every Morning.  0   • albuterol sulfate  (90 Base) MCG/ACT inhaler Inhale 2 puffs Every 4 (Four) Hours As Needed for Wheezing. 1 inhaler 0   • amphetamine-dextroamphetamine (ADDERALL) 10 MG tablet Take 2 tablets by mouth Daily.  0   • azithromycin (ZITHROMAX Z-DON) 250 MG tablet Take 2 tablets at the same time Day 1 and then 1 tablet every 24 hour thereafter. 6 tablet 1   • Chlorcyclizine-Pseudoephed (STAHIST AD) 25-60 MG tablet One tablet three times a day as needed for congestion 30 tablet 0   • guaiFENesin-codeine (ROMILAR-AC) 100-10 MG/5ML syrup 5-10 cc's by mouth BID (naptime/bedtime) for cough 150 mL 0   • lithium carbonate 150 MG capsule Take 150 mg by mouth Daily.  0   • MethylPREDNISolone (MEDROL) 4 MG tablet follow package directions 1 each 0   • vitamin D (ERGOCALCIFEROL) 58911 units capsule capsule Take 1 capsule by mouth Every 7 (Seven) Days. Take Monday and thursday 4 capsule 1     No current facility-administered medications for this visit.        PRN Meds:.    Allergies   Allergen Reactions   • Latex Rash       Social History     Socioeconomic History   • Marital status:      Spouse name: Not on file   • Number of children: Not on file   • Years of education: Not on file   • Highest education level: Not on file   Tobacco Use   • Smoking status: Never Smoker   • Smokeless tobacco: Never Used   Substance and Sexual Activity   • Alcohol use: Yes   • Drug use: No       Family History   Problem Relation Age of Onset   • Breast cancer Maternal Grandmother    • Breast cancer Paternal Grandmother    • Breast cancer Maternal Aunt    • Breast cancer Paternal Aunt        Review of Systems   Constitutional: Negative for activity change, appetite change, fatigue and unexpected weight change.   HENT: Negative for congestion, facial swelling, sore throat, trouble swallowing and voice change.    Eyes: Negative for photophobia and visual  disturbance.   Respiratory: Negative for cough and choking.    Cardiovascular: Negative for chest pain.   Gastrointestinal: Negative for abdominal distention, abdominal pain, anal bleeding, blood in stool, constipation, diarrhea, nausea, rectal pain and vomiting.        GERD  Dysphagia   Endocrine: Negative for polyphagia.   Musculoskeletal: Positive for arthralgias. Negative for gait problem and joint swelling.   Skin: Negative for color change, pallor and rash.   Allergic/Immunologic: Negative for food allergies.   Neurological: Negative for speech difficulty and headaches.   Hematological: Does not bruise/bleed easily.   Psychiatric/Behavioral: Negative for agitation, confusion and sleep disturbance.       Vitals:    08/28/19 1344   BP: 130/80   Temp: 98.1 °F (36.7 °C)       Physical Exam   Constitutional: She is oriented to person, place, and time. She appears well-developed and well-nourished. No distress.   HENT:   Head: Normocephalic.   Mouth/Throat: Oropharynx is clear and moist. No oropharyngeal exudate.   Eyes: Conjunctivae and EOM are normal. No scleral icterus.   Neck: Normal range of motion. No thyromegaly present.   Cardiovascular: Normal rate and regular rhythm.   No murmur heard.  Pulmonary/Chest: Breath sounds normal. No respiratory distress. She has no wheezes. She has no rales.   Abdominal: Soft. Bowel sounds are normal. She exhibits no distension and no mass. There is no hepatosplenomegaly. There is no tenderness.   Musculoskeletal: Normal range of motion. She exhibits no edema or tenderness.   Lymphadenopathy:     She has no cervical adenopathy.   Neurological: She is alert and oriented to person, place, and time.   Skin: Skin is warm and dry. No rash noted. She is not diaphoretic. No erythema.   Psychiatric: She has a normal mood and affect. Her behavior is normal.   Vitals reviewed.      ASSESSMENT  #1 GERD  #2 dysphagia  #3 history of polyps      PLAN  Schedule EGD      ICD-10-CM ICD-9-CM    1. Gastroesophageal reflux disease, esophagitis presence not specified K21.9 530.81   2. Dysphagia, unspecified type R13.10 787.20

## 2019-09-06 ENCOUNTER — ANESTHESIA (OUTPATIENT)
Dept: GASTROENTEROLOGY | Facility: HOSPITAL | Age: 64
End: 2019-09-06

## 2019-09-06 ENCOUNTER — HOSPITAL ENCOUNTER (OUTPATIENT)
Facility: HOSPITAL | Age: 64
Setting detail: HOSPITAL OUTPATIENT SURGERY
Discharge: HOME | End: 2019-09-06
Attending: INTERNAL MEDICINE | Admitting: INTERNAL MEDICINE

## 2019-09-06 ENCOUNTER — ANESTHESIA EVENT (OUTPATIENT)
Dept: GASTROENTEROLOGY | Facility: HOSPITAL | Age: 64
End: 2019-09-06

## 2019-09-06 VITALS
DIASTOLIC BLOOD PRESSURE: 79 MMHG | BODY MASS INDEX: 22.29 KG/M2 | WEIGHT: 142 LBS | SYSTOLIC BLOOD PRESSURE: 156 MMHG | OXYGEN SATURATION: 100 % | TEMPERATURE: 98.2 F | HEART RATE: 60 BPM | RESPIRATION RATE: 16 BRPM | HEIGHT: 67 IN

## 2019-09-06 DIAGNOSIS — R13.10 DYSPHAGIA, UNSPECIFIED TYPE: ICD-10-CM

## 2019-09-06 DIAGNOSIS — K21.9 GASTROESOPHAGEAL REFLUX DISEASE, ESOPHAGITIS PRESENCE NOT SPECIFIED: ICD-10-CM

## 2019-09-06 PROCEDURE — 87081 CULTURE SCREEN ONLY: CPT | Performed by: INTERNAL MEDICINE

## 2019-09-06 PROCEDURE — 25010000002 PROPOFOL 10 MG/ML EMULSION: Performed by: ANESTHESIOLOGY

## 2019-09-06 PROCEDURE — 25010000002 ONDANSETRON PER 1 MG: Performed by: ANESTHESIOLOGY

## 2019-09-06 PROCEDURE — 88305 TISSUE EXAM BY PATHOLOGIST: CPT | Performed by: INTERNAL MEDICINE

## 2019-09-06 PROCEDURE — C1726 CATH, BAL DIL, NON-VASCULAR: HCPCS | Performed by: INTERNAL MEDICINE

## 2019-09-06 PROCEDURE — 43239 EGD BIOPSY SINGLE/MULTIPLE: CPT | Performed by: INTERNAL MEDICINE

## 2019-09-06 PROCEDURE — S0260 H&P FOR SURGERY: HCPCS | Performed by: INTERNAL MEDICINE

## 2019-09-06 PROCEDURE — 88313 SPECIAL STAINS GROUP 2: CPT | Performed by: INTERNAL MEDICINE

## 2019-09-06 PROCEDURE — 43249 ESOPH EGD DILATION <30 MM: CPT | Performed by: INTERNAL MEDICINE

## 2019-09-06 RX ORDER — PROPOFOL 10 MG/ML
VIAL (ML) INTRAVENOUS CONTINUOUS PRN
Status: DISCONTINUED | OUTPATIENT
Start: 2019-09-06 | End: 2019-09-06 | Stop reason: SURG

## 2019-09-06 RX ORDER — SODIUM CHLORIDE, SODIUM LACTATE, POTASSIUM CHLORIDE, CALCIUM CHLORIDE 600; 310; 30; 20 MG/100ML; MG/100ML; MG/100ML; MG/100ML
30 INJECTION, SOLUTION INTRAVENOUS CONTINUOUS
Status: DISCONTINUED | OUTPATIENT
Start: 2019-09-06 | End: 2019-09-06 | Stop reason: HOSPADM

## 2019-09-06 RX ORDER — LIDOCAINE HYDROCHLORIDE 20 MG/ML
INJECTION, SOLUTION INFILTRATION; PERINEURAL AS NEEDED
Status: DISCONTINUED | OUTPATIENT
Start: 2019-09-06 | End: 2019-09-06 | Stop reason: SURG

## 2019-09-06 RX ORDER — PROPOFOL 10 MG/ML
VIAL (ML) INTRAVENOUS AS NEEDED
Status: DISCONTINUED | OUTPATIENT
Start: 2019-09-06 | End: 2019-09-06 | Stop reason: SURG

## 2019-09-06 RX ORDER — ONDANSETRON 2 MG/ML
INJECTION INTRAMUSCULAR; INTRAVENOUS AS NEEDED
Status: DISCONTINUED | OUTPATIENT
Start: 2019-09-06 | End: 2019-09-06 | Stop reason: SURG

## 2019-09-06 RX ADMIN — ONDANSETRON 4 MG: 2 INJECTION INTRAMUSCULAR; INTRAVENOUS at 13:55

## 2019-09-06 RX ADMIN — SODIUM CHLORIDE, POTASSIUM CHLORIDE, SODIUM LACTATE AND CALCIUM CHLORIDE 30 ML/HR: 600; 310; 30; 20 INJECTION, SOLUTION INTRAVENOUS at 13:32

## 2019-09-06 RX ADMIN — PROPOFOL 100 MCG/KG/MIN: 10 INJECTION, EMULSION INTRAVENOUS at 13:55

## 2019-09-06 RX ADMIN — LIDOCAINE HYDROCHLORIDE 60 MG: 20 INJECTION, SOLUTION INFILTRATION; PERINEURAL at 13:55

## 2019-09-06 RX ADMIN — PROPOFOL 100 MG: 10 INJECTION, EMULSION INTRAVENOUS at 13:55

## 2019-09-06 NOTE — ANESTHESIA POSTPROCEDURE EVALUATION
"Patient: Cathy Gutiérrez    Procedure Summary     Date:  09/06/19 Room / Location:   [facility] /  [facility]    Anesthesia Start:  1353 Anesthesia Stop:  1419    Procedure:  ESOPHAGOGASTRODUODENOSCOPY with cold biopsies and balloon dilitation size 15, 16.5, 18 (N/A Esophagus) Diagnosis:       Hiatal hernia      Gastritis      Duodenitis      (Gastroesophageal reflux disease, esophagitis presence not specified [K21.9])      (Dysphagia, unspecified type [R13.10])    Surgeon:  Angelito Kelley MD Provider:  Rod Ortiz MD    Anesthesia Type:  MAC ASA Status:  2          Anesthesia Type: MAC  Last vitals  BP   143/79 (09/06/19 1427)   Temp   36.8 °C (98.2 °F) (09/06/19 1329)   Pulse   60 (09/06/19 1427)   Resp   16 (09/06/19 1427)     SpO2   99 % (09/06/19 1427)     Post Anesthesia Care and Evaluation    Patient location during evaluation: PACU  Patient participation: complete - patient participated  Level of consciousness: awake  Pain score: 0  Pain management: adequate  Airway patency: patent  Anesthetic complications: No anesthetic complications  PONV Status: none  Cardiovascular status: acceptable  Respiratory status: acceptable  Hydration status: acceptable    Comments: /79 (BP Location: Left arm, Patient Position: Lying)   Pulse 60   Temp 36.8 °C (98.2 °F) (Oral)   Resp 16   Ht 170.2 cm (67\")   Wt 64.4 kg (142 lb)   SpO2 99%   BMI 22.24 kg/m²       "

## 2019-09-06 NOTE — ANESTHESIA PREPROCEDURE EVALUATION
Anesthesia Evaluation     Patient summary reviewed and Nursing notes reviewed                Airway   Mallampati: I  TM distance: >3 FB  Neck ROM: full  No difficulty expected  Dental - normal exam     Pulmonary - normal exam   (+) sleep apnea,   Cardiovascular - normal exam    (+) hypertension,       Neuro/Psych  (+) psychiatric history,     GI/Hepatic/Renal/Endo    (+)  GERD,      Musculoskeletal     Abdominal  - normal exam    Bowel sounds: normal.   Substance History - negative use     OB/GYN negative ob/gyn ROS         Other   (+) arthritis   history of cancer                    Anesthesia Plan    ASA 2     MAC     Anesthetic plan, all risks, benefits, and alternatives have been provided, discussed and informed consent has been obtained with: patient.

## 2019-09-06 NOTE — H&P
[facility]  Pre Procedure History & Physical    Chief Complaint:   GERD, dysphagia, abnormal esophagram    Subjective     HPI:   This 64-year-old female presents to the endoscopy suite for upper endoscopic evaluation.  She has a history of reflux as well as dysphagia.  Recent esophagram showed thickening in the esophageal mucosa.  Last upper endoscopy performed 2014.    Past Medical History:   Past Medical History:   Diagnosis Date   • Arthritis    • Cancer (CMS/HCC)     throid   • Depression    • Disease of thyroid gland    • GERD (gastroesophageal reflux disease)    • Hypertension    • Sleep apnea        Past Surgical History:  Past Surgical History:   Procedure Laterality Date   • BREAST BIOPSY     • BUNIONECTOMY Bilateral    • COLONOSCOPY  approx 2014    normal per pt   • DILATATION AND CURETTAGE     • EPIDURAL BLOCK     • HYSTERECTOMY     • THYROID SURGERY     • UPPER GASTROINTESTINAL ENDOSCOPY  approx 2014    pt doesn't recall        Family History:  Family History   Problem Relation Age of Onset   • Breast cancer Maternal Grandmother    • Breast cancer Paternal Grandmother    • Breast cancer Maternal Aunt    • Breast cancer Paternal Aunt        Social History:   reports that she has never smoked. She has never used smokeless tobacco. She reports that she drinks alcohol. She reports that she does not use drugs.    Medications:   No medications prior to admission.       Allergies:  Latex    ROS:    Pertinent items are noted in HPI, all other systems reviewed and negative     Objective     There were no vitals taken for this visit.    Physical Exam   Constitutional: Pt is oriented to person, place, and time and well-developed, well-nourished, and in no distress.   Mouth/Throat: Oropharynx is clear and moist.   Neck: Normal range of motion.   Cardiovascular: Normal rate, regular rhythm and normal heart sounds.    Pulmonary/Chest: Effort normal and breath sounds normal.   Abdominal: Soft.  Nontender  Skin: Skin is warm and dry.   Psychiatric: Mood, memory, affect and judgment normal.     Assessment/Plan     Diagnosis:  GERD  Dysphagia  Abnormal esophagram    Anticipated Surgical Procedure:  EGD    The risks, benefits, and alternatives of this procedure have been discussed with the patient or the responsible party- the patient understands and agrees to proceed.

## 2019-09-07 LAB — UREASE TISS QL: NEGATIVE

## 2019-09-10 LAB
CYTO UR: NORMAL
LAB AP CASE REPORT: NORMAL
LAB AP DIAGNOSIS COMMENT: NORMAL
PATH REPORT.ADDENDUM SPEC: NORMAL
PATH REPORT.FINAL DX SPEC: NORMAL
PATH REPORT.GROSS SPEC: NORMAL

## 2019-09-19 ENCOUNTER — TELEPHONE (OUTPATIENT)
Dept: GASTROENTEROLOGY | Facility: CLINIC | Age: 64
End: 2019-09-19

## 2019-09-19 RX ORDER — PANTOPRAZOLE SODIUM 40 MG/1
40 TABLET, DELAYED RELEASE ORAL DAILY
Qty: 30 TABLET | Refills: 11 | Status: SHIPPED | OUTPATIENT
Start: 2019-09-19 | End: 2021-01-27 | Stop reason: SDUPTHER

## 2019-09-19 NOTE — TELEPHONE ENCOUNTER
----- Message from Angelito ZEPEDA MD sent at 9/10/2019 12:49 PM EDT -----  Regarding: Biopsy results  Okay to call results, recommend initiate PPI for treatment of Arellano's esophagus.  Would warrant follow-up EGD in 2 years.  If dysphagia persists, can offer esophageal manometry.  Office follow-up annually or sooner as needed.  ----- Message -----  From: Lab, Background User  Sent: 9/7/2019   4:47 PM  To: Angelito ZEPEDA MD

## 2019-09-19 NOTE — TELEPHONE ENCOUNTER
Call to pt.  Advise per path report that duodenal bx bengin.  Stomach body with mild to moderate chronic, nonspecific inflammation.  GE junction with rare evidence of developing Arellano's.    Advise per Dr Kelley that recommend initiate ppi for tx of Arellano's.  Would warrant f/u egd in 2 yrs.  If dysphagia persists, can offer esophageal manometry.  Office f/u annually or sooner as needed.    Verb understanding.  States will watch swallowing pattern and call back if needed.    EGD for 9/6/21 and o/v for 9/6/20 placed in recall.    Escribe completed for pantoprazole 40 mg 1 tab po daily, #30, R11.

## 2020-01-06 ENCOUNTER — TRANSCRIBE ORDERS (OUTPATIENT)
Dept: PHYSICAL THERAPY | Facility: HOSPITAL | Age: 65
End: 2020-01-06

## 2020-01-06 DIAGNOSIS — M47.812 CERVICAL ARTHRITIS: Primary | ICD-10-CM

## 2020-01-16 ENCOUNTER — HOSPITAL ENCOUNTER (OUTPATIENT)
Dept: PHYSICAL THERAPY | Facility: HOSPITAL | Age: 65
Setting detail: THERAPIES SERIES
Discharge: HOME | End: 2020-01-16

## 2020-01-16 DIAGNOSIS — M54.2 CERVICALGIA: ICD-10-CM

## 2020-01-16 DIAGNOSIS — M43.6 NECK STIFFNESS: Primary | ICD-10-CM

## 2020-01-16 PROCEDURE — 97110 THERAPEUTIC EXERCISES: CPT

## 2020-01-16 PROCEDURE — 97161 PT EVAL LOW COMPLEX 20 MIN: CPT

## 2020-01-16 NOTE — THERAPY EVALUATION
Outpatient Physical Therapy Ortho Initial Evaluation  [facility]     Patient Name: Cathy Gutiérrez  : 1955  MRN: [id number]  Today's Date: 2020      Visit Date: 2020    Patient Active Problem List   Diagnosis   • Closed nondisplaced fracture of fifth left metatarsal bone   • Osteoarthritis of left thumb   • Onychomycosis   • Hammer toe of left foot   • Vitamin D deficiency   • Arthritis of foot   • Obstructive sleep apnea, adult   • Gastroesophageal reflux disease   • Dysphagia        Past Medical History:   Diagnosis Date   • Arthritis    • Cancer (CMS/HCC)     throid   • Depression    • Disease of thyroid gland    • GERD (gastroesophageal reflux disease)    • Hypertension    • PONV (postoperative nausea and vomiting)    • Sleep apnea         Past Surgical History:   Procedure Laterality Date   • BREAST BIOPSY     • BUNIONECTOMY Bilateral    • COLONOSCOPY  approx     normal per pt   • DILATATION AND CURETTAGE     • ENDOSCOPY N/A 2019    Procedure: ESOPHAGOGASTRODUODENOSCOPY with cold biopsies and balloon dilitation size 15, 16.5, 18;  Surgeon: Angelito Kelley MD;  Location: [facility] ENDOSCOPY;  Service: Gastroenterology   • EPIDURAL BLOCK     • HYSTERECTOMY     • THYROID SURGERY     • UPPER GASTROINTESTINAL ENDOSCOPY  approx     pt doesn't recall        Visit Dx:     ICD-10-CM ICD-9-CM   1. Neck stiffness M43.6 723.5   2. Cervicalgia M54.2 723.1         Patient History     Row Name 20 1000             History    Chief Complaint  Pain;Joint stiffness  -RS      Type of Pain  Neck pain  -RS      Date Current Problem(s) Began  19  -RS      Brief Description of Current Complaint  The pt is a 63 yo female who presents with neck stiffness. She reports more stiffness L than R with turning. She has pain if she sits for too long looking down, her pain has worsened in the past year or so. Her pain increases when she moves toward end ROM and decreases when she moves away  from the painful ROM. She has difficulty with driving and sleeping. She cares for her brother who is disabled but does not have to do physical lifting. Denies numbness or tingling. Has difficulty with prolonged positioning. Gets massage every 6 months which helps but not long term. Heat helps.  -RS      Are you or can you be pregnant  No  -RS         Pain     Pain Location  Neck  -RS      Pain at Present  0  -RS      Pain at Best  8  -RS         Fall Risk Assessment    Any falls in the past year:  No  -RS         Services    Are you currently receiving Home Health services  No  -RS         Daily Activities    Primary Language  English  -RS      How does patient learn best?  Listening;Reading  -RS      Pt Participated in POC and Goals  Yes  -RS         Safety    Are you being hurt, hit, or frightened by anyone at home or in your life?  No  -RS      Are you being neglected by a caregiver  No  -RS        User Key  (r) = Recorded By, (t) = Taken By, (c) = Cosigned By    Initials Name Provider Type    RS Christina Barber PT Physical Therapist          PT Ortho     Row Name 01/16/20 1100       Posture/Observations    Alignment Options  Forward head;Rounded shoulders  -RS    Forward Head  Moderate  -RS    Rounded Shoulders  Mild  -RS       Myotomal Screen- Upper Quarter Clearing    Shoulder flexion (C5)  Bilateral:;4 (Good)  -RS    Elbow flexion/wrist extension (C6)  Bilateral:;5 (Normal)  -RS    Elbow extension/wrist flexion (C7)  Bilateral:;4+ (Good +)  -RS    Finger flexion/ (C8)  Bilateral:;4+ (Good +)  -RS    Finger abduction (T1)  Bilateral:;4+ (Good +)  -RS       Cervical/Shoulder ROM Screen    Cervical flexion  Impaired 0-40  -RS    Cervical extension  Impaired 0-35  -RS    Cervical lateral flexion  Impaired R 13, L8  -RS    Cervical rotation  Impaired R 40, L28  -RS       Special Tests/Palpation    Special Tests/Palpation  Cervical/Thoracic  -RS       Cervical Palpation    Cervical Palpation- Location?   Suboccipital  -RS    Suboccipital  Bilateral:;Left:;Trigger point;Tender  -RS    Cervical Facets  Bilateral:;Tender  -RS    Levator Scapula  Bilateral:;Tender  -RS    Upper Traps  Bilateral:;Left:;Tender;Guarded/taut  -RS       Cervical Accessory Motions    Cervical Accessory Motions Tested?  Yes  -RS    OA Flexion  Center:;Left:;Hypomobile  -RS    Sideglide- C3  Right:;Hypomobile;Left pain  -RS    Sideglide- C4  Right:;Hypomobile;Left pain  -RS    Sideglide- C5  Right:;Hypomobile;Left pain  -RS    Sideglide- C6  Right:;Hypomobile;Left pain  -RS    PA glide- C2  Center:;Left:;Hypomobile;Left pain  -RS    PA glide- C3  Center:;Left:;Hypomobile;Left pain  -RS    PA glide- C4  Center:;Left:;Hypomobile;Left pain  -RS    PA glide- C5  Hypomobile;Center:;Left:;Left pain  -RS    PA glide- C6  Hypomobile;Center:;Left:;Left pain  -RS    PA glide- C7  Center:;Left:;Hypomobile;Left pain  -RS       Thoracic Accessory Motions    Thoracic Accessory Motions Tested?  Yes  -RS    Pa glide- Upper thoracic  Hypomobile  -RS    Pa glide- Middle thoracic  Hypomobile  -RS       MMT (Manual Muscle Testing)    Rt Upper Ext  Rt Shoulder Extension;Rt Shoulder Internal Rotation;Rt Shoulder External Rotation  -RS    Lt Upper Ext  Lt Shoulder Extension;Lt Shoulder Internal Rotation;Lt Shoulder External Rotation  -RS       MMT Right Upper Ext    Rt Shoulder Extension MMT, Gross Movement  (4/5) good  -RS    Rt Shoulder Internal Rotation MMT, Gross Movement  (4/5) good  -RS    Rt Shoulder External Rotation MMT, Gross Movement  (4-/5) good minus  -RS       MMT Left Upper Ext    Lt Shoulder Extension MMT, Gross Movement  (4-/5) good minus  -RS    Lt Shoulder Internal Rotation MMT, Gross Movement  (4/5) good  -RS    Lt Shoulder External Rotation MMT, Gross Movement  (3+/5) fair plus  -RS       Sensation    Sensation WNL?  WNL  -RS      User Key  (r) = Recorded By, (t) = Taken By, (c) = Cosigned By    Initials Name Provider Type    JEANMARIE Barber  KALYN Vasquez Physical Therapist                      Therapy Education  Education Details: role of PT, clinical findings, POC, anatomy, HEP, pain management  Given: HEP, Symptoms/condition management  Program: New  How Provided: Verbal, Demonstration, Written  Provided to: Patient  Level of Understanding: Teach back education performed, Verbalized, Demonstrated     PT OP Goals     Row Name 01/16/20 1000          PT Short Term Goals    STG Date to Achieve  02/06/20  -RS     STG 1  The pt will demonstrate IND and compliant with initial HEP.  -RS     STG 1 Progress  New  -RS     STG 2  The pt will report at least 40% improvement in drivingn performance  -RS     STG 2 Progress  New  -RS        Long Term Goals    LTG Date to Achieve  03/16/20  -RS     LTG 1  The pt will demonstrate IND and compliant with progressive HEP focused on return to PLOF and long term condition management.  -RS     LTG 1 Progress  New  -RS     LTG 2  The pt will tolerate prolonged positioning for 30 min prior to increased pain.  -RS     LTG 2 Progress  New  -RS     LTG 3  The pt will demonstrate B cervical rotation to at least 60 degrees each way for improved driving performance.   -RS     LTG 3 Progress  New  -RS     LTG 4  The pt will report at least 50% reduction in HA frequency/ intensity form improved QOL.  -RS     LTG 4 Progress  New  -RS        Time Calculation    PT Goal Re-Cert Due Date  04/15/20  -RS       User Key  (r) = Recorded By, (t) = Taken By, (c) = Cosigned By    Initials Name Provider Type    RS Christina Barber PT Physical Therapist          PT Assessment/Plan     Row Name 01/16/20 1000          PT Assessment    Functional Limitations  Limitation in home management;Limitations in community activities;Limitations in functional capacity and performance;Performance in leisure activities;Performance in self-care ADL  -RS     Impairments  Range of motion;Posture;Poor body mechanics;Pain;Muscle strength;Joint mobility  -RS      Assessment Comments  Cathy Gutiérrez is a 64 y.o. female referred to physical therapy for neck pain/stiffness. She presents with a stable clinical presentation, along with no remarkable comorbidities and personal factors of chronicity of condition that may impact her progress in the plan of care. Pt presents today with decreased cervical rotation and sidebending, specifically to the left, decreased strength postural muscles, soft tissue limitations B UT/lev scap/ suboccipital muscles, decreased segmental mobility cervical spine . her signs and symptoms are consistent with a physical therapy diagnosis of cervical hypomobility . The previous impairments limit her ability to drive without pain, tolerate prolonged positioning, HA frequency. She scores 18% disability on the NDI where 100% is full disability. Pt will benefit from skilled PT to address the previous impairments and return to PLOF.  -RS     Please refer to paper survey for additional self-reported information  Yes  -RS     Rehab Potential  Good  -RS     Patient/caregiver participated in establishment of treatment plan and goals  Yes  -RS     Patient would benefit from skilled therapy intervention  Yes  -RS        PT Plan    PT Frequency  1x/week  -RS     Predicted Duration of Therapy Intervention (Therapy Eval)  6-8 weeks  -RS     Planned CPT's?  PT EVAL LOW COMPLEXITY: 33635;PT RE-EVAL: 48068;PT THER PROC EA 15 MIN: 96210;PT THER ACT EA 15 MIN: 40279;PT MANUAL THERAPY EA 15 MIN: 59334;PT NEUROMUSC RE-EDUCATION EA 15 MIN: 36229;PT SELF CARE/HOME MGMT/TRAIN EA 15: 11691;PT HOT OR COLD PACK TREAT MCARE;PT ELECTRICAL STIM UNATTEND: ;PT ULTRASOUND EA 15 MIN: 66396;PT TRACTION CERVICAL: 53422  -RS     PT Plan Comments  Focus initially on improved cervical segmental mobility with manual therapy and therex. Consider UT stretch, supine HA/ER, rows, self SNAGs.  -RS       User Key  (r) = Recorded By, (t) = Taken By, (c) = Cosigned By    Initials Name Provider  Type    RS Christina Barber PT Physical Therapist            OP Exercises     Row Name 01/16/20 1100             Total Minutes    32152 - PT Therapeutic Exercise Minutes  10  -RS      65069 - PT Manual Therapy Minutes  8  -RS         Exercise 1    Exercise Name 1  supine chin tuck  -RS      Cueing 1  Verbal  -RS      Reps 1  10  -RS      Time 1  5s  -RS         Exercise 2    Exercise Name 2  supine tuck and turn  -RS      Cueing 2  Verbal  -RS      Reps 2  10  -RS         Exercise 3    Exercise Name 3  sidelying thoracic rotation  -RS      Cueing 3  Verbal  -RS      Reps 3  5ea  -RS         Exercise 4    Exercise Name 4  post shoulder rolls  -RS      Cueing 4  Verbal  -RS      Reps 4  15  -RS         Exercise 5    Exercise Name 5  scap retract  -RS      Cueing 5  Verbal;Demo  -RS      Reps 5  15  -RS      Time 5  3s  -RS        User Key  (r) = Recorded By, (t) = Taken By, (c) = Cosigned By    Initials Name Provider Type    RS Christina Barber PT Physical Therapist           Manual Rx (last 36 hours)      Manual Treatments     Row Name 01/16/20 1100             Total Minutes    29140 - PT Manual Therapy Minutes  8  -RS         Manual Rx 1    Manual Rx 1 Location  subocc release/ UT stretch  -RS         Manual Rx 2    Manual Rx 2 Location  cervica CPA glides  -RS        User Key  (r) = Recorded By, (t) = Taken By, (c) = Cosigned By    Initials Name Provider Type    RS Christina Barber PT Physical Therapist                      Outcome Measure Options: Neck Disability Index (NDI)  Neck Disability Index  Neck Disability Index Comments: 18% disability      Time Calculation:     Start Time: 1045  Stop Time: 1130  Time Calculation (min): 45 min     Therapy Charges for Today     Code Description Service Date Service Provider Modifiers Qty    93883198851  PT THER PROC EA 15 MIN 1/16/2020 Christina Barber, PT GP 1    [id number] HC PT EVAL LOW COMPLEXITY 2 1/16/2020 Christina Barber, PT GP 1          PT  G-Codes  Outcome Measure Options: Neck Disability Index (NDI)         Christina Barber, PT  1/16/2020

## 2020-01-16 NOTE — THERAPY EVALUATION
Outpatient Physical Therapy Ortho Initial Evaluation  Psychiatric     Patient Name: Cathy Gutiérrez  : 1955  MRN: [id number]  Today's Date: 2020      Visit Date: 2020    Patient Active Problem List   Diagnosis   • Closed nondisplaced fracture of fifth left metatarsal bone   • Osteoarthritis of left thumb   • Onychomycosis   • Hammer toe of left foot   • Vitamin D deficiency   • Arthritis of foot   • Obstructive sleep apnea, adult   • Gastroesophageal reflux disease   • Dysphagia        Past Medical History:   Diagnosis Date   • Arthritis    • Cancer (CMS/HCC)     throid   • Depression    • Disease of thyroid gland    • GERD (gastroesophageal reflux disease)    • Hypertension    • PONV (postoperative nausea and vomiting)    • Sleep apnea         Past Surgical History:   Procedure Laterality Date   • BREAST BIOPSY     • BUNIONECTOMY Bilateral    • COLONOSCOPY  approx     normal per pt   • DILATATION AND CURETTAGE     • ENDOSCOPY N/A 2019    Procedure: ESOPHAGOGASTRODUODENOSCOPY with cold biopsies and balloon dilitation size 15, 16.5, 18;  Surgeon: Angelito Kelley MD;  Location: [facility] ENDOSCOPY;  Service: Gastroenterology   • EPIDURAL BLOCK     • HYSTERECTOMY     • THYROID SURGERY     • UPPER GASTROINTESTINAL ENDOSCOPY  approx     pt doesn't recall        Visit Dx:     ICD-10-CM ICD-9-CM   1. Neck stiffness M43.6 723.5   2. Cervicalgia M54.2 723.1         Patient History     Row Name 20 1000             History    Chief Complaint  Pain;Joint stiffness  -RS      Type of Pain  Neck pain  -RS      Date Current Problem(s) Began  19  -RS      Brief Description of Current Complaint  The pt is a 63 yo female who presents with neck stiffness. She reports more stiffness L than R with turning. She has pain if she sits for too long looking down, her pain has worsened in the past year or so. Her pain increases when she moves toward end ROM and decreases when she moves away  from the painful ROM. She has difficulty with driving and sleeping. She cares for her brother who is disabled but does not have to do physical lifting. Denies numbness or tingling. Has difficulty with prolonged positioning. Gets massage every 6 months which helps but not long term. Heat helps.  -RS      Are you or can you be pregnant  No  -RS         Pain     Pain Location  Neck  -RS      Pain at Present  0  -RS      Pain at Best  8  -RS         Fall Risk Assessment    Any falls in the past year:  No  -RS         Services    Are you currently receiving Home Health services  No  -RS         Daily Activities    Primary Language  English  -RS      How does patient learn best?  Listening;Reading  -RS      Pt Participated in POC and Goals  Yes  -RS         Safety    Are you being hurt, hit, or frightened by anyone at home or in your life?  No  -RS      Are you being neglected by a caregiver  No  -RS        User Key  (r) = Recorded By, (t) = Taken By, (c) = Cosigned By    Initials Name Provider Type    RS Christina Barber PT Physical Therapist          PT Ortho     Row Name 01/16/20 1100       Posture/Observations    Alignment Options  Forward head;Rounded shoulders  -RS    Forward Head  Moderate  -RS    Rounded Shoulders  Mild  -RS       Myotomal Screen- Upper Quarter Clearing    Shoulder flexion (C5)  Bilateral:;4 (Good)  -RS    Elbow flexion/wrist extension (C6)  Bilateral:;5 (Normal)  -RS    Elbow extension/wrist flexion (C7)  Bilateral:;4+ (Good +)  -RS    Finger flexion/ (C8)  Bilateral:;4+ (Good +)  -RS    Finger abduction (T1)  Bilateral:;4+ (Good +)  -RS       Cervical/Shoulder ROM Screen    Cervical flexion  Impaired 0-40  -RS    Cervical extension  Impaired 0-35  -RS    Cervical lateral flexion  Impaired R 13, L8  -RS    Cervical rotation  Impaired R 40, L28  -RS       Special Tests/Palpation    Special Tests/Palpation  Cervical/Thoracic  -RS       Cervical Palpation    Cervical Palpation- Location?   Suboccipital  -RS    Suboccipital  Bilateral:;Left:;Trigger point;Tender  -RS    Cervical Facets  Bilateral:;Tender  -RS    Levator Scapula  Bilateral:;Tender  -RS    Upper Traps  Bilateral:;Left:;Tender;Guarded/taut  -RS       Cervical Accessory Motions    Cervical Accessory Motions Tested?  Yes  -RS    OA Flexion  Center:;Left:;Hypomobile  -RS    Sideglide- C3  Right:;Hypomobile;Left pain  -RS    Sideglide- C4  Right:;Hypomobile;Left pain  -RS    Sideglide- C5  Right:;Hypomobile;Left pain  -RS    Sideglide- C6  Right:;Hypomobile;Left pain  -RS    PA glide- C2  Center:;Left:;Hypomobile;Left pain  -RS    PA glide- C3  Center:;Left:;Hypomobile;Left pain  -RS    PA glide- C4  Center:;Left:;Hypomobile;Left pain  -RS    PA glide- C5  Hypomobile;Center:;Left:;Left pain  -RS    PA glide- C6  Hypomobile;Center:;Left:;Left pain  -RS    PA glide- C7  Center:;Left:;Hypomobile;Left pain  -RS       Thoracic Accessory Motions    Thoracic Accessory Motions Tested?  Yes  -RS    Pa glide- Upper thoracic  Hypomobile  -RS    Pa glide- Middle thoracic  Hypomobile  -RS       MMT (Manual Muscle Testing)    Rt Upper Ext  Rt Shoulder Extension;Rt Shoulder Internal Rotation;Rt Shoulder External Rotation  -RS    Lt Upper Ext  Lt Shoulder Extension;Lt Shoulder Internal Rotation;Lt Shoulder External Rotation  -RS       MMT Right Upper Ext    Rt Shoulder Extension MMT, Gross Movement  (4/5) good  -RS    Rt Shoulder Internal Rotation MMT, Gross Movement  (4/5) good  -RS    Rt Shoulder External Rotation MMT, Gross Movement  (4-/5) good minus  -RS       MMT Left Upper Ext    Lt Shoulder Extension MMT, Gross Movement  (4-/5) good minus  -RS    Lt Shoulder Internal Rotation MMT, Gross Movement  (4/5) good  -RS    Lt Shoulder External Rotation MMT, Gross Movement  (3+/5) fair plus  -RS       Sensation    Sensation WNL?  WNL  -RS      User Key  (r) = Recorded By, (t) = Taken By, (c) = Cosigned By    Initials Name Provider Type    JEANMARIE Barber  KALYN Vasquez Physical Therapist                      Therapy Education  Education Details: role of PT, clinical findings, POC, anatomy, HEP, pain management  Given: HEP, Symptoms/condition management  Program: New  How Provided: Verbal, Demonstration, Written  Provided to: Patient  Level of Understanding: Teach back education performed, Verbalized, Demonstrated     PT OP Goals     Row Name 01/16/20 1000          PT Short Term Goals    STG Date to Achieve  02/06/20  -RS     STG 1  The pt will demonstrate IND and compliant with initial HEP.  -RS     STG 1 Progress  New  -RS     STG 2  The pt will report at least 40% improvement in drivingn performance  -RS     STG 2 Progress  New  -RS        Long Term Goals    LTG Date to Achieve  03/16/20  -RS     LTG 1  The pt will demonstrate IND and compliant with progressive HEP focused on return to PLOF and long term condition management.  -RS     LTG 1 Progress  New  -RS     LTG 2  The pt will tolerate prolonged positioning for 30 min prior to increased pain.  -RS     LTG 2 Progress  New  -RS     LTG 3  The pt will demonstrate B cervical rotation to at least 60 degrees each way for improved driving performance.   -RS     LTG 3 Progress  New  -RS     LTG 4  The pt will report at least 50% reduction in HA frequency/ intensity form improved QOL.  -RS     LTG 4 Progress  New  -RS        Time Calculation    PT Goal Re-Cert Due Date  04/15/20  -RS       User Key  (r) = Recorded By, (t) = Taken By, (c) = Cosigned By    Initials Name Provider Type    RS Christina Barber PT Physical Therapist          PT Assessment/Plan     Row Name 01/16/20 1000          PT Assessment    Functional Limitations  Limitation in home management;Limitations in community activities;Limitations in functional capacity and performance;Performance in leisure activities;Performance in self-care ADL  -RS     Impairments  Range of motion;Posture;Poor body mechanics;Pain;Muscle strength;Joint mobility  -RS      Assessment Comments  Cathy Gutiérrez is a 64 y.o. female referred to physical therapy for neck pain/stiffness. She presents with a stable clinical presentation, along with no remarkable comorbidities and personal factors of chronicity of condition that may impact her progress in the plan of care. Pt presents today with decreased cervical rotation and sidebending, specifically to the left, decreased strength postural muscles, soft tissue limitations B UT/lev scap/ suboccipital muscles, decreased segmental mobility cervical spine . her signs and symptoms are consistent with a physical therapy diagnosis of cervical hypomobility . The previous impairments limit her ability to drive without pain, tolerate prolonged positioning, HA frequency. She scores 18% disability on the NDI where 100% is full disability. Pt will benefit from skilled PT to address the previous impairments and return to PLOF.  -RS     Please refer to paper survey for additional self-reported information  Yes  -RS     Rehab Potential  Good  -RS     Patient/caregiver participated in establishment of treatment plan and goals  Yes  -RS     Patient would benefit from skilled therapy intervention  Yes  -RS        PT Plan    PT Frequency  1x/week  -RS     Predicted Duration of Therapy Intervention (Therapy Eval)  6-8 weeks  -RS     Planned CPT's?  PT EVAL LOW COMPLEXITY: 91676;PT RE-EVAL: 07059;PT THER PROC EA 15 MIN: 89314;PT THER ACT EA 15 MIN: 12712;PT MANUAL THERAPY EA 15 MIN: 25758;PT NEUROMUSC RE-EDUCATION EA 15 MIN: 49154;PT SELF CARE/HOME MGMT/TRAIN EA 15: 64178;PT HOT OR COLD PACK TREAT MCARE;PT ELECTRICAL STIM UNATTEND: ;PT ULTRASOUND EA 15 MIN: 73869;PT TRACTION CERVICAL: 27254  -RS       User Key  (r) = Recorded By, (t) = Taken By, (c) = Cosigned By    Initials Name Provider Type    RS Christina Barber, PT Physical Therapist            OP Exercises     Row Name 01/16/20 1100             Total Minutes    82211 - PT Therapeutic Exercise  Minutes  10  -RS      10491 - PT Manual Therapy Minutes  8  -RS         Exercise 1    Exercise Name 1  supine chin tuck  -RS      Cueing 1  Verbal  -RS      Reps 1  10  -RS      Time 1  5s  -RS         Exercise 2    Exercise Name 2  supine tuck and turn  -RS      Cueing 2  Verbal  -RS      Reps 2  10  -RS         Exercise 3    Exercise Name 3  sidelying thoracic rotation  -RS      Cueing 3  Verbal  -RS      Reps 3  5ea  -RS         Exercise 4    Exercise Name 4  post shoulder rolls  -RS      Cueing 4  Verbal  -RS      Reps 4  15  -RS         Exercise 5    Exercise Name 5  scap retract  -RS      Cueing 5  Verbal;Demo  -RS      Reps 5  15  -RS      Time 5  3s  -RS        User Key  (r) = Recorded By, (t) = Taken By, (c) = Cosigned By    Initials Name Provider Type    RS Christina Barber PT Physical Therapist           Manual Rx (last 36 hours)      Manual Treatments     Row Name 01/16/20 1100             Total Minutes    42942 - PT Manual Therapy Minutes  8  -RS         Manual Rx 1    Manual Rx 1 Location  subocc release/ UT stretch  -RS         Manual Rx 2    Manual Rx 2 Location  cervica CPA glides  -RS        User Key  (r) = Recorded By, (t) = Taken By, (c) = Cosigned By    Initials Name Provider Type    RS Christina Barber PT Physical Therapist                      Outcome Measure Options: Neck Disability Index (NDI)  Neck Disability Index  Neck Disability Index Comments: 18% disability      Time Calculation:     Start Time: 1045  Stop Time: 1130  Time Calculation (min): 45 min     Therapy Charges for Today     Code Description Service Date Service Provider Modifiers Qty    29183132881 HC PT THER PROC EA 15 MIN 1/16/2020 Christina Barber, PT GP 1    60994859497 HC PT EVAL LOW COMPLEXITY 2 1/16/2020 Christina Barber, PT GP 1          PT G-Codes  Outcome Measure Options: Neck Disability Index (NDI)         Christina Barber PT  1/16/2020

## 2020-01-24 ENCOUNTER — APPOINTMENT (OUTPATIENT)
Dept: PHYSICAL THERAPY | Facility: HOSPITAL | Age: 65
End: 2020-01-24

## 2020-01-27 ENCOUNTER — APPOINTMENT (OUTPATIENT)
Dept: PHYSICAL THERAPY | Facility: HOSPITAL | Age: 65
End: 2020-01-27

## 2020-01-28 ENCOUNTER — APPOINTMENT (OUTPATIENT)
Dept: PHYSICAL THERAPY | Facility: HOSPITAL | Age: 65
End: 2020-01-28

## 2020-02-05 ENCOUNTER — APPOINTMENT (OUTPATIENT)
Dept: PHYSICAL THERAPY | Facility: HOSPITAL | Age: 65
End: 2020-02-05

## 2020-02-12 ENCOUNTER — APPOINTMENT (OUTPATIENT)
Dept: PHYSICAL THERAPY | Facility: HOSPITAL | Age: 65
End: 2020-02-12

## 2020-03-23 ENCOUNTER — DOCUMENTATION (OUTPATIENT)
Dept: PHYSICAL THERAPY | Facility: HOSPITAL | Age: 65
End: 2020-03-23

## 2020-03-23 DIAGNOSIS — M43.6 NECK STIFFNESS: ICD-10-CM

## 2020-03-23 DIAGNOSIS — M54.2 CERVICALGIA: Primary | ICD-10-CM

## 2020-03-23 NOTE — THERAPY DISCHARGE NOTE
Outpatient Physical Therapy Discharge Summary         Patient Name: Cathy Gutiérrez  : 1955  MRN: [id number]    Today's Date: 3/23/2020    Visit Dx:    ICD-10-CM ICD-9-CM   1. Cervicalgia M54.2 723.1   2. Neck stiffness M43.6 723.5       PT OP Goals     Row Name 20 1200          PT Short Term Goals    STG Date to Achieve  20  -RS     STG 1  The pt will demonstrate IND and compliant with initial HEP.  -RS     STG 1 Progress  Not Met  -RS     STG 2  The pt will report at least 40% improvement in drivingn performance  -RS     STG 2 Progress  Not Met  -RS        Long Term Goals    LTG Date to Achieve  20  -RS     LTG 1  The pt will demonstrate IND and compliant with progressive HEP focused on return to PLOF and long term condition management.  -RS     LTG 1 Progress  Not Met  -RS     LTG 2  The pt will tolerate prolonged positioning for 30 min prior to increased pain.  -RS     LTG 2 Progress  Not Met  -RS     LTG 3  The pt will demonstrate B cervical rotation to at least 60 degrees each way for improved driving performance.   -RS     LTG 3 Progress  Not Met  -RS     LTG 4  The pt will report at least 50% reduction in HA frequency/ intensity form improved QOL.  -RS     LTG 4 Progress  Not Met  -RS       User Key  (r) = Recorded By, (t) = Taken By, (c) = Cosigned By    Initials Name Provider Type    Christina Coats PT Physical Therapist          OP PT Discharge Summary  Date of Discharge: 20  Reason for Discharge: Patient/Caregiver request  Outcomes Achieved: Unable to make functional progress toward goals at this time  Discharge Destination: Home with home program  Discharge Instructions/Additional Comments: Pt did not return to skilled PT, unable to make functional progress toward goals.      Time Calculation:                    Christina Barber PT  3/23/2020

## 2020-06-16 ENCOUNTER — HOSPITAL ENCOUNTER (OUTPATIENT)
Dept: CT IMAGING | Facility: HOSPITAL | Age: 65
Discharge: HOME | End: 2020-06-16
Admitting: INTERNAL MEDICINE

## 2020-06-16 DIAGNOSIS — R91.8 LUNG NODULES: ICD-10-CM

## 2020-06-16 PROCEDURE — 71250 CT THORAX DX C-: CPT

## 2020-06-22 ENCOUNTER — TRANSCRIBE ORDERS (OUTPATIENT)
Dept: ADMINISTRATIVE | Facility: HOSPITAL | Age: 65
End: 2020-06-22

## 2020-06-22 DIAGNOSIS — Z12.31 SCREENING MAMMOGRAM, ENCOUNTER FOR: Primary | ICD-10-CM

## 2020-08-13 ENCOUNTER — HOSPITAL ENCOUNTER (OUTPATIENT)
Dept: MAMMOGRAPHY | Facility: HOSPITAL | Age: 65
Discharge: HOME | End: 2020-08-13
Admitting: INTERNAL MEDICINE

## 2020-08-13 DIAGNOSIS — Z12.31 SCREENING MAMMOGRAM, ENCOUNTER FOR: ICD-10-CM

## 2020-08-13 PROCEDURE — 77067 SCR MAMMO BI INCL CAD: CPT

## 2020-08-13 PROCEDURE — 77063 BREAST TOMOSYNTHESIS BI: CPT

## 2020-09-22 ENCOUNTER — OFFICE VISIT (OUTPATIENT)
Dept: ORTHOPEDIC SURGERY | Facility: CLINIC | Age: 65
End: 2020-09-22

## 2020-09-22 VITALS — WEIGHT: 142 LBS | TEMPERATURE: 97.3 F | HEIGHT: 67 IN | BODY MASS INDEX: 22.29 KG/M2

## 2020-09-22 DIAGNOSIS — S83.8X2A MENISCAL INJURY, LEFT, INITIAL ENCOUNTER: Primary | ICD-10-CM

## 2020-09-22 DIAGNOSIS — R52 PAIN: ICD-10-CM

## 2020-09-22 PROCEDURE — 99214 OFFICE O/P EST MOD 30 MIN: CPT | Performed by: ORTHOPAEDIC SURGERY

## 2020-09-22 PROCEDURE — 20610 DRAIN/INJ JOINT/BURSA W/O US: CPT | Performed by: ORTHOPAEDIC SURGERY

## 2020-09-22 PROCEDURE — 73564 X-RAY EXAM KNEE 4 OR MORE: CPT | Performed by: ORTHOPAEDIC SURGERY

## 2020-09-22 RX ORDER — METHYLPREDNISOLONE ACETATE 80 MG/ML
80 INJECTION, SUSPENSION INTRA-ARTICULAR; INTRALESIONAL; INTRAMUSCULAR; SOFT TISSUE
Status: COMPLETED | OUTPATIENT
Start: 2020-09-22 | End: 2020-09-22

## 2020-09-22 RX ORDER — GABAPENTIN 100 MG/1
CAPSULE ORAL
COMMUNITY
Start: 2020-07-01 | End: 2020-12-28 | Stop reason: ALTCHOICE

## 2020-09-22 RX ORDER — ZOLPIDEM TARTRATE 10 MG/1
TABLET ORAL
COMMUNITY
Start: 2020-06-22 | End: 2022-03-09 | Stop reason: SDUPTHER

## 2020-09-22 RX ORDER — CARIPRAZINE 1.5 MG/1
CAPSULE, GELATIN COATED ORAL
COMMUNITY
Start: 2020-06-16 | End: 2020-12-28 | Stop reason: ALTCHOICE

## 2020-09-22 RX ADMIN — METHYLPREDNISOLONE ACETATE 80 MG: 80 INJECTION, SUSPENSION INTRA-ARTICULAR; INTRALESIONAL; INTRAMUSCULAR; SOFT TISSUE at 14:34

## 2020-09-22 NOTE — PROGRESS NOTES
.proSubjective   Cathy Gutiérrez is a 65 y.o. female.     History of Present Illness     65-year-old female presents today with left knee pain.  States she first noticed her pain in early August after she stepped out of her car in which she placed all her weight on her left knee it was at an incline and she had a twisting motion.  The next day she had pain and swelling of her knee.  She states she has intermittent swelling and mainly that her knee feels full and tight at times.  Her discomfort is located about her knee mainly on the medial aspect as well as posteriorly up the back of her thigh and the proximal aspect of her calf.  She feels that she is had a decreased range of motion due to her discomfort.  Worse with activity.  Better with rest.  Stairs and squatting type activities make it worse.  She denies any numbness or tingling.  She says some anti-inflammatories have helped some.  Denies fever or shortness of breath.    The following portions of the patient's history were reviewed and updated as appropriate: allergies, current medications, past family history, past medical history, past social history, past surgical history and problem list.    Review of Systems     All systems reviewed and are negative.    Objective   Physical Exam  Alert and oriented x3    Positive tenderness to palpation along the medial joint line.  Minimal minimal tenderness to palpation along the lateral joint line.    Minimal effusion compared to the contralateral knee.    Range of motion 0-1 20.    Positive Cecilio's negative Apley's test.    4+ out of 5 strength all muscle groups tested bilaterally.    Sensation intact distally light touch.    2+ pedal pulses.    Gait normal unassisted.    Radiographic imaging    An AP pelvis taken the office today showing preservation of joint space bilaterally.  No acute fracture lesions appreciated.    4 views of the left knee were taken the office today showing mild joint space narrowing  medial compartments bilaterally preservation joint space in the remaining 2 compartments.  Minimal early osteophyte or formation found in the left knee.  No acute fractures or lesions appreciated.    No comparative x-rays.    Assessment/Plan   Cathy was seen today for pain and initial evaluation.    Diagnoses and all orders for this visit:    Meniscal injury, left, initial encounter  -     Ambulatory Referral to Physical Therapy Evaluate and treat    Pain  -     XR Knee 4+ View Left    Other orders  -     Large Joint Arthrocentesis: L knee        Went over the imaging and clinical findings with the patient today feel that she may have had a degenerative medial meniscus tear given her symptoms and mechanism of suspected injury.  I discussed conservative and surgical interventions.  We will proceed with conservative treatment consisting of steroid injection, anti-inflammatories, physical therapy.  I told the patient if her symptoms do not improve over the next 4 to 6 weeks may consider an MRI for further evaluation otherwise she may follow-up as needed..  All questions were answered.  Patient understands and agrees with this plan.        Large Joint Arthrocentesis: L knee  Date/Time: 9/22/2020 2:34 PM  Consent given by: patient  Site marked: site marked  Timeout: Immediately prior to procedure a time out was called to verify the correct patient, procedure, equipment, support staff and site/side marked as required   Supporting Documentation  Indications: pain   Procedure Details  Location: knee - L knee  Preparation: Patient was prepped and draped in the usual sterile fashion  Needle gauge: 21G.  Medications administered: 4 mL lidocaine (cardiac); 80 mg methylPREDNISolone acetate 80 MG/ML  Patient tolerance: patient tolerated the procedure well with no immediate complications

## 2020-10-08 ENCOUNTER — TREATMENT (OUTPATIENT)
Dept: PHYSICAL THERAPY | Facility: CLINIC | Age: 65
End: 2020-10-08

## 2020-10-08 DIAGNOSIS — Z78.9 DIFFICULTY NAVIGATING STAIRS: ICD-10-CM

## 2020-10-08 DIAGNOSIS — M25.562 ACUTE PAIN OF LEFT KNEE: Primary | ICD-10-CM

## 2020-10-08 PROCEDURE — 97110 THERAPEUTIC EXERCISES: CPT | Performed by: PHYSICAL THERAPIST

## 2020-10-08 PROCEDURE — 97161 PT EVAL LOW COMPLEX 20 MIN: CPT | Performed by: PHYSICAL THERAPIST

## 2020-10-08 NOTE — PROGRESS NOTES
Physical Therapy Initial Evaluation and Plan of Care      Patient: Cathy Gutiérrez   : 1955  Diagnosis/ICD-10 Code:  Acute pain of left knee [M25.562]  Referring practitioner: Ed Tony MD  Date of Initial Visit: 10/8/2020  Today's Date: 10/8/2020  Patient seen for 1 sessions           Subjective Evaluation    History of Present Illness  Date of onset: 2020  Mechanism of injury: Twisted L knee getting out of car on an incline and was not able to go up and down stairs and the knee was swelling. She has prior torn hamstring and problem with the left foot since 5th metatarsal fracture. She is not very active.  She feels better after steroid injection in the L knee 2 weeks ago. She has backed off anti-inflammatory.     Pain  Current pain ratin  At best pain ratin  At worst pain rating: 3  Location: L knee     Social Support  Lives in: multiple-level home             Objective          Observations   Left Knee   Negative for edema.     Additional Knee Observation Details  Decreased overall muscle definition of L quadriceps     Palpation     Additional Palpation Details  No tenderness or warmth to L knee  No tenderness to R ankle    Active Range of Motion   Left Knee   Normal active range of motion    Passive Range of Motion   Left Knee   Normal passive range of motion    Additional Passive Range of Motion Details  No pain with overpressure    Strength/Myotome Testing     Left Hip   Planes of Motion   Flexion: 4+  Abduction: 4    Right Hip   Planes of Motion   Flexion: 4+  Abduction: 4    Left Knee   Flexion: 4+  Extension: 4+  Quadriceps contraction: good    Additional Strength Details  Able to bridge hips    Ambulation   Weight-Bearing Status   Weight-Bearing Status (Left): weight-bearing as tolerated   Weight-Bearing Status (Right): weight-bearing as tolerated    Assistive device used: none    Ambulation: Level Surfaces   Ambulation without assistive device: independent    Ambulation:  Stairs   Ascend stairs: independent  Pattern: reciprocal  Railings: one rail  Descend stairs: independent  Pattern: reciprocal  Railings: one rail    Additional Stairs Ambulation Details  R lateral ankle pain reported with stairs     Observational Gait     Additional Observational Gait Details  Decreased eccentric quad control with swing to heel strike     Functional Assessment     Single Leg Stance   Left: 10 seconds  Right: 5 (Increased lateral foot / ankle deviation ) seconds        See Exercise, Manual, and Modality Logs for complete treatment.   Functional outcome score: KOS ADL score is 65% ability score             Assessment & Plan     Assessment  Impairments: abnormal gait, abnormal muscle tone, activity intolerance, impaired balance, impaired physical strength, lacks appropriate home exercise program and pain with function  Assessment details: Cathy Gutiérrez is a 65 y.o. year-old female referred to physical therapy for left knee pain after strain. She presents with a stable clinical presentation that is much better following steroid injection to knee 2 weeks ago.  She has comorbidities of past injuries to left hamstrings, left foot and ankle and pain in the right ankle and personal factors of not in present exercise habits that may affect her progress in the plan of care.  Signs and symptoms are consistent with physical therapy diagnosis of left knee strain with core and leg weakness and decreased flexibility that will benefit from physical therapy rehab. .   Prognosis: good  Functional Limitations: walking, uncomfortable because of pain, standing and unable to perform repetitive tasks  Goals  Plan Goals: PT Goal Recert Due Date: 01/06/2021    STG Date to Achieve: 11/19/2021     STG 1.  Patient will demonstrate an independent HEP for knee strength and ROM/flexibility with good compliance    STG 2 Patient will negotiate stairs reciprocally with light rail support without pain from knee or ankle    STG  3. Patient will increase hip and knee strength to 4+/5    LTG Date to Achieve: 2021    LTG 1. Patient will report no regular knee pain with ADLs    LTG 2 Patient will be independent with long term exercise plan     LTG 3: Patient will report decreased functional disability on KOS ADL score from 65 % ability to 80 % ability score         Plan  Therapy options: will be seen for skilled physical therapy services  Planned modality interventions: ultrasound, TENS, electrical stimulation/Russian stimulation and cryotherapy  Planned therapy interventions: balance/weight-bearing training, flexibility, functional ROM exercises, home exercise program, joint mobilization, therapeutic activities, stretching, strengthening, soft tissue mobilization, postural training, neuromuscular re-education, manual therapy, abdominal trunk stabilization, gait training and transfer training  Duration in visits: 20  Duration in weeks: 12  Treatment plan discussed with: patient        Timed:  Manual Therapy:    0     mins  99910;  Therapeutic Exercise:    30     mins  30594;     Neuromuscular Narda:    0    mins  44118;    Therapeutic Activity:     0     mins  65349;     Gait Trainin     mins  15328;     Ultrasound:     0     mins  31948;    Electrical Stimulation:    0     mins  09478 ( );  Iontophoresis    0     mins 01766  Dry Needling   0     mins      Untimed:  Electrical Stimulation:    0     mins  20405 ( );  Mechanical Traction:    0     mins  01278;     Timed Treatment:   30   mins   Total Treatment:     50   mins    PT SIGNATURE: Heide Alvarez, PT   DATE TREATMENT INITIATED: 10/8/2020    Initial Certification  Certification Period: 2021  I certify that the therapy services are furnished while this patient is under my care.  The services outlined above are required by this patient, and will be reviewed every 90 days.     PHYSICIAN: Ed Tony MD      DATE:     Please sign and return via fax to  [phone].. Thank you, [facility].

## 2020-10-14 ENCOUNTER — TREATMENT (OUTPATIENT)
Dept: PHYSICAL THERAPY | Facility: CLINIC | Age: 65
End: 2020-10-14

## 2020-10-14 DIAGNOSIS — M25.562 ACUTE PAIN OF LEFT KNEE: Primary | ICD-10-CM

## 2020-10-14 DIAGNOSIS — Z78.9 DIFFICULTY NAVIGATING STAIRS: ICD-10-CM

## 2020-10-14 PROCEDURE — 97110 THERAPEUTIC EXERCISES: CPT | Performed by: PHYSICAL THERAPIST

## 2020-10-14 NOTE — PROGRESS NOTES
Physical Therapy Daily Progress Note    Patient: Cathy Gutiérrez   : 1955  Diagnosis/ICD-10 Code:  Acute pain of left knee [M25.562]  Referring practitioner: Ed Tony MD  Date of Initial Visit: Type: THERAPY  Noted: 10/8/2020  Today's Date: 10/14/2020  Patient seen for 2 sessions           Subjective Little sore with exercises.     Objective   See Exercise, Manual, and Modality Logs for complete treatment.   Try to keep R foot straight and flat going up stairs with hand rail to help decrease pain R outer ankle  Ice as needed L knee       Assessment/Plan Patient has core and extremity muscle weakness and B tightness hamstrings and calf muscles.  Left lower back a little painful during exercise.            Timed:    Manual Therapy:    0     mins  98263;  Therapeutic Exercise:    45     mins  23307;     Neuromuscular Narda:    0    mins  81738;    Therapeutic Activity:     0     mins  44386;     Gait Trainin     mins  03517;     Ultrasound:     0     mins  74321;    Electrical Stimulation:    0     mins  03378 ( );  Iontophoresis    0     mins 74622;  Aquatic Therapy    0     mins 15407;  Dry Needling              0     mins    Untimed:  Electrical Stimulation:    0     mins  30797 (MC );  Mechanical Traction:    0     mins  07182;     Timed Treatment:   45   mins   Total Treatment:     45   mins  Heide Alvarez PT  Physical Therapist

## 2020-10-16 ENCOUNTER — TREATMENT (OUTPATIENT)
Dept: PHYSICAL THERAPY | Facility: CLINIC | Age: 65
End: 2020-10-16

## 2020-10-16 DIAGNOSIS — Z78.9 DIFFICULTY NAVIGATING STAIRS: ICD-10-CM

## 2020-10-16 DIAGNOSIS — M25.562 ACUTE PAIN OF LEFT KNEE: Primary | ICD-10-CM

## 2020-10-16 PROCEDURE — 97110 THERAPEUTIC EXERCISES: CPT | Performed by: PHYSICAL THERAPIST

## 2020-10-16 NOTE — PROGRESS NOTES
Physical Therapy Daily Progress Note    Patient: Cathy Gutiérrez   : 1955  Diagnosis/ICD-10 Code:  Acute pain of left knee [M25.562]  Referring practitioner: Ed Tony MD  Date of Initial Visit: Type: THERAPY  Noted: 10/8/2020  Today's Date: 10/16/2020  Patient seen for 3 sessions           Subjective I am more aware of my knee with exercise and I am noticing the R ankle pain more walking around the house    Objective   See Exercise, Manual, and Modality Logs for complete treatment.   Screened R ankle pain that is mildly tender peroneal longus tendon.  No pain with non weight bearing ankle AROM or MMT.  She is not stable standing on R single leg and has increased R lateral ankle pain with pronation bare foot and supination with shoes.  I recommend an OTC arch support to better support the arch.  I want her to continue to work on core and leg mat based exercises and aerobic conditioning machines that are seated.        Assessment/Plan  Patient has some R peroneal tendonitis that is painful with weight bearing primarily stair climbing. She does not have good single leg standing stability on the R as she does the L side that has had other orthopedic issues.            Timed:    Manual Therapy:    0     mins  31408;  Therapeutic Exercise:    57     mins  71867;     Neuromuscular Narda:    0    mins  84038;    Therapeutic Activity:     0     mins  94815;     Gait Trainin     mins  77215;     Ultrasound:     0     mins  88116;    Electrical Stimulation:    0     mins  22717 ( );  Iontophoresis    0     mins 34690;  Aquatic Therapy    0     mins 06985;  Dry Needling              0     mins    Untimed:  Electrical Stimulation:    0     mins  29889 ( );  Mechanical Traction:    0     mins  01064;     Timed Treatment:   57   mins   Total Treatment:     57   mins  Heide Alvarez, PT  Physical Therapist

## 2020-10-20 ENCOUNTER — TREATMENT (OUTPATIENT)
Dept: PHYSICAL THERAPY | Facility: CLINIC | Age: 65
End: 2020-10-20

## 2020-10-20 DIAGNOSIS — M25.562 ACUTE PAIN OF LEFT KNEE: Primary | ICD-10-CM

## 2020-10-20 DIAGNOSIS — Z78.9 DIFFICULTY NAVIGATING STAIRS: ICD-10-CM

## 2020-10-20 PROCEDURE — 97110 THERAPEUTIC EXERCISES: CPT | Performed by: PHYSICAL THERAPIST

## 2020-10-20 NOTE — PROGRESS NOTES
Physical Therapy Daily Progress Note    Patient: Cathy Gutiérrez   : 1955  Diagnosis/ICD-10 Code:  Acute pain of left knee [M25.562]  Referring practitioner: Ed Tony MD  Date of Initial Visit: Type: THERAPY  Noted: 10/8/2020  Today's Date: 10/20/2020  Patient seen for 4 sessions           Subjective My other knee is bothering me some, may be arthritis.  The left knee and ankle are not bothering me as much today.  Patient arrived late for appointment.     Objective   See Exercise, Manual, and Modality Logs for complete treatment.       Assessment/Plan Right knee more irritable than left knee today and right ankle not painful on stair descent as last week.  Balance is not as stable on R as L leg.           Timed:    Manual Therapy:    0     mins  53479;  Therapeutic Exercise:    30     mins  54854;     Neuromuscular Narda:    4    mins  31715;    Therapeutic Activity:     0     mins  71922;     Gait Trainin     mins  50836;     Ultrasound:     0     mins  01474;    Electrical Stimulation:    0     mins  04842 ( );  Iontophoresis    0     mins 17808;  Aquatic Therapy    0     mins 90498;  Dry Needling              0     mins    Untimed:  Electrical Stimulation:    0     mins  42364 ( );  Mechanical Traction:    0     mins  55902;     Timed Treatment:   34   mins   Total Treatment:     34   mins  Heide Alvarez PT  Physical Therapist

## 2020-10-21 ENCOUNTER — OFFICE VISIT (OUTPATIENT)
Dept: ORTHOPEDIC SURGERY | Facility: CLINIC | Age: 65
End: 2020-10-21

## 2020-10-21 VITALS — HEIGHT: 67 IN | BODY MASS INDEX: 24.48 KG/M2 | TEMPERATURE: 97.3 F | WEIGHT: 156 LBS

## 2020-10-21 DIAGNOSIS — M17.11 PRIMARY OSTEOARTHRITIS OF RIGHT KNEE: Primary | ICD-10-CM

## 2020-10-21 PROCEDURE — 99214 OFFICE O/P EST MOD 30 MIN: CPT | Performed by: ORTHOPAEDIC SURGERY

## 2020-10-23 ENCOUNTER — TELEPHONE (OUTPATIENT)
Dept: ORTHOPEDICS | Facility: OTHER | Age: 65
End: 2020-10-23

## 2020-10-23 NOTE — PROGRESS NOTES
General Exam    Patient: Cathy Gutiérrez    YOB: 1955    Medical Record Number: [id number]    Chief Complaints: Right knee pain    History of Present Illness:     65 y.o. female patient who presents for evaluation treatment of her right knee.  Patient dates her left knee is doing much better after her injection and physical therapy however she now feels that her right knee and leg have been bothering her some.  This is of new onset no recent trauma to that side.  Nothing really seeming to make it better just overactivity make it worse    Denies any numbness or tingling.  Denies any fevers, cough or shortness of breath.    Allergies:   Allergies   Allergen Reactions   • Latex Rash       Home Medications:      Current Outpatient Medications:   •  albuterol sulfate  (90 Base) MCG/ACT inhaler, Inhale 2 puffs Every 4 (Four) Hours As Needed for Wheezing., Disp: 1 inhaler, Rfl: 0  •  amphetamine-dextroamphetamine (ADDERALL) 10 MG tablet, Take 2 tablets by mouth Daily., Disp: , Rfl: 0  •  azithromycin (Zithromax Z-Brannon) 250 MG tablet, Take 2 tablets the first day, then 1 tablet daily for 4 days., Disp: 6 tablet, Rfl: 0  •  busPIRone (BUSPAR) 15 MG tablet, , Disp: , Rfl: 0  •  Chlorcyclizine-Pseudoephed (STAHIST AD) 25-60 MG tablet, One tablet three times a day as needed for congestion, Disp: 30 tablet, Rfl: 0  •  estradiol (MINIVELLE, VIVELLE-DOT) 0.1 MG/24HR patch, , Disp: , Rfl: 0  •  gabapentin (NEURONTIN) 100 MG capsule, TK 1 C PO TID, Disp: , Rfl:   •  guaiFENesin-codeine (ROMILAR-AC) 100-10 MG/5ML syrup, 5-10 cc's by mouth BID (naptime/bedtime) for cough, Disp: 150 mL, Rfl: 0  •  irbesartan (AVAPRO) 300 MG tablet, , Disp: , Rfl: 0  •  LamoTRIgine  MG tablet sustained-release 24 hour, , Disp: , Rfl: 0  •  liothyronine (CYTOMEL) 5 MCG tablet, Take 5 mcg by mouth Daily., Disp: , Rfl:   •  LORazepam (ATIVAN) 0.5 MG tablet, Take 0.5 mg by mouth Daily As Needed., Disp: , Rfl: 0  •   methylphenidate (RITALIN) 10 MG tablet, Take 10 mg by mouth Daily., Disp: , Rfl: 0  •  pantoprazole (PROTONIX) 40 MG EC tablet, Take 1 tablet by mouth Daily., Disp: 30 tablet, Rfl: 11  •  phenazopyridine (PYRIDIUM) 200 MG tablet, One tablet 3 x's a day as needed for symptoms, Disp: 10 tablet, Rfl: 0  •  SYNTHROID 125 MCG tablet, Take 125 mcg by mouth Every Morning., Disp: , Rfl: 0  •  vitamin D (ERGOCALCIFEROL) 63043 units capsule capsule, Take 1 capsule by mouth Every 7 (Seven) Days. Take Monday and thursday, Disp: 4 capsule, Rfl: 1  •  Vraylar 1.5 MG capsule capsule, TK 1 C PO D, Disp: , Rfl:   •  zolpidem (AMBIEN) 10 MG tablet, TK 1 T PO QHS PRF INSOMNIA, Disp: , Rfl:     Past Medical History:   Diagnosis Date   • Arthritis    • Cancer (CMS/HCC)     throid   • Depression    • Disease of thyroid gland    • GERD (gastroesophageal reflux disease)    • Hypertension    • PONV (postoperative nausea and vomiting)    • Sleep apnea        Past Surgical History:   Procedure Laterality Date   • BREAST BIOPSY     • BUNIONECTOMY Bilateral    • COLONOSCOPY  approx 2014    normal per pt   • DILATATION AND CURETTAGE     • ENDOSCOPY N/A 9/6/2019    Procedure: ESOPHAGOGASTRODUODENOSCOPY with cold biopsies and balloon dilitation size 15, 16.5, 18;  Surgeon: Angelito Kelley MD;  Location: [facility];  Service: Gastroenterology   • EPIDURAL BLOCK     • HYSTERECTOMY     • THYROID SURGERY     • UPPER GASTROINTESTINAL ENDOSCOPY  approx 2014    pt doesn't recall        Social History     Occupational History   • Not on file   Tobacco Use   • Smoking status: Never Smoker   • Smokeless tobacco: Never Used   Substance and Sexual Activity   • Alcohol use: Yes   • Drug use: No   • Sexual activity: Defer      Social History     Social History Narrative   • Not on file       Family History   Problem Relation Age of Onset   • Breast cancer Maternal Grandmother    • Breast cancer Paternal Grandmother    • Breast cancer Maternal Aunt   "  • Breast cancer Paternal Aunt        Review of Systems:      Constitutional: Denies fever, shaking or chills   Eyes: Denies change in visual acuity   HEENT: Denies nasal congestion or sore throat   Respiratory: Denies cough or shortness of breath   Cardiovascular: Denies chest pain or edema  Endocrine: Denies tremors, palpitations, intolerance of heat or cold, polyuria, polydipsia.  GI: Denies abdominal pain, nausea, vomiting, bloody stools or diarrhea  : Denies frequency, urgency, incontinence, retention, or nocturia.  Musculoskeletal: Denies numbness, tingling or loss of motor function except as above  Integument: Denies rash, lesion or ulceration   Neurologic: Denies headache or focal weakness, deficits  Heme: Denies spontaneous or excessive bleeding, epistaxis, hematuria, melena, fatigue, enlarged or tender lymph nodes.      All other pertinent positives and negatives as noted above in HPI.    Physical Exam: 65 y.o. female    Vitals:    10/21/20 1033   Temp: 97.3 °F (36.3 °C)   Weight: 70.8 kg (156 lb)   Height: 170.2 cm (67\")       General:  Patient is awake and alert.  Appears in no acute distress or discomfort.    Psych:  Affect and demeanor are appropriate.    Eyes:  Conjunctiva and sclera appear grossly normal.  Eyes track well and EOM seem to be intact.    Ears:  No gross abnormalities.  Hearing adequate for the exam.    Cardiovascular:  Regular rate and rhythm.    Lungs:  Good chest expansion.  Breathing unlabored.    Lymph:  No palpable masses or adenopathy in the affected extremity    Musculoskeletal/Extremities:    Right lower extremity no tenderness palpation.  Knee stable varus valgus, full range of motion 0-1 20.  Minimal joint effusion.  Ankles and toes up and down, sensation intact distal light touch, 2+ pedal pulses, calf soft compressible.         Radiology:       4 views of the right knee taken evaluate in the office to evaluate the patient's complaint/s.    Patient is some mild " degenerative changes consistent with early osteoarthritis.  No fractures lesions appreciated.     No imaging for comparison.    Assessment: Right knee osteoarthritis      Plan:      Discussed clinical imaging findings with the patient today.  Discussed conservative treatments I feel that she would benefit from some focused physical therapy on the right side as she can of flared up some of her arthritis after favoring her left side due to recent injury.  Feel that with physical therapy for the right knee gait training her condition will improve.  If things are not getting better next 6 to 8 weeks she may return for further evaluation treatment.  Patient may take anti-inflammatories for symptom management.           We will plan for follow up as needed..    All questions were answered.  Patient understands and agrees with the plan.    Ed Tony MD    10/21/2020    CC to Maria Teresa Ziegler MD

## 2020-10-28 ENCOUNTER — TREATMENT (OUTPATIENT)
Dept: PHYSICAL THERAPY | Facility: CLINIC | Age: 65
End: 2020-10-28

## 2020-10-28 DIAGNOSIS — M25.571 ACUTE RIGHT ANKLE PAIN: ICD-10-CM

## 2020-10-28 DIAGNOSIS — Z78.9 DIFFICULTY NAVIGATING STAIRS: ICD-10-CM

## 2020-10-28 DIAGNOSIS — M25.562 ACUTE PAIN OF BOTH KNEES: Primary | ICD-10-CM

## 2020-10-28 DIAGNOSIS — M25.561 ACUTE PAIN OF BOTH KNEES: Primary | ICD-10-CM

## 2020-10-28 PROCEDURE — 97110 THERAPEUTIC EXERCISES: CPT | Performed by: PHYSICAL THERAPIST

## 2020-10-28 NOTE — PROGRESS NOTES
Physical Therapy Re-certification         Patient: Cathy Gutiérrez   : 1955  Diagnosis/ICD-10 Code:  Acute pain of both knees [M25.561, M25.562]  Referring practitioner: Ed Tony MD  Date of Initial Visit: Type: THERAPY  Noted: 10/8/2020  Today's Date: 10/28/2020  Patient seen for 5 sessions      Subjective:     Clinical Progress: improved  Home Program Compliance: Yes  Treatment has included:  therapeutic exercise and patient education with home exercise program     Subjective Notice Inside of L knee and little R knee and R ankle going up stairs. I have not been doing exercises past 6 days with taking care of sick dogs.     Objective          Observations   Left Knee   Negative for edema.     Right Knee   Negative for edema.     Additional Knee Observation Details  Decreased overall muscle definition of L quadriceps     Palpation     Right Tenderness of the peroneus.     Active Range of Motion   Left Knee   Normal active range of motion    Right Knee   Normal active range of motion    Right Ankle/Foot   Normal active range of motion    Additional Active Range of Motion Details  Crepitus under R patella    Passive Range of Motion   Left Knee   Normal passive range of motion    Right Knee   Normal passive range of motion    Additional Passive Range of Motion Details  No pain with overpressure    Strength/Myotome Testing     Left Hip   Planes of Motion   Flexion: 4+  Abduction: 4    Right Hip   Planes of Motion   Flexion: 4+  Abduction: 4    Left Knee   Flexion: 4+  Extension: 4+  Quadriceps contraction: good    Right Knee   Flexion: 4+  Extension: 4+  Quadriceps contraction: good    Right Ankle/Foot   Dorsiflexion: 5  Plantar flexion: 5  Inversion: 5  Eversion: 5    Additional Strength Details  Able to bridge hips    Ambulation   Weight-Bearing Status   Weight-Bearing Status (Left): weight-bearing as tolerated   Weight-Bearing Status (Right): weight-bearing as tolerated   Assistive device used:  none    Ambulation: Level Surfaces   Ambulation without assistive device: independent    Ambulation: Stairs   Ascend stairs: independent  Pattern: reciprocal  Railings: one rail  Descend stairs: independent  Pattern: reciprocal  Railings: one rail    Additional Stairs Ambulation Details  R lateral ankle pain reported with stairs     Observational Gait     Additional Observational Gait Details  Decreased eccentric quad control with swing to heel strike     Functional Assessment     Single Leg Stance   Left: 10 seconds  Right: 5 (Increased lateral foot / ankle deviation ) seconds      See Exercise, Manual, and Modality Logs for complete treatment.     Patient shown how to operate some of the gym machines we used today       Assessment & Plan     Assessment  Impairments: abnormal gait, abnormal muscle tone, activity intolerance, impaired balance, impaired physical strength, lacks appropriate home exercise program and pain with function  Assessment details: Cathy Gutiérrez is a 65 y.o. year-old female referred to physical therapy for left knee pain after strain. She has felt some new onset of right knee and right ankle pain as well. She presents with a stable clinical presentation.  She has comorbidities of past injuries to left hamstrings, left foot and ankle.   Signs and symptoms are consistent with physical therapy diagnosis of left knee strain, right knee overuse and tendonitis of the right ankle.  She will benefit to continue physical therapy for core and leg strength and flexibility and establishing a long term exercise program.   Prognosis: good  Functional Limitations: walking, uncomfortable because of pain, standing and unable to perform repetitive tasks  Goals  Plan Goals: PT Goal Recert Due Date: 01/26/2021    STG Date to Achieve: 12/09/2020     STG 1.  Patient will demonstrate an independent HEP for knee strength and ROM/flexibility with good compliance  Ongoing     STG 2 Patient will negotiate stairs  reciprocally with light rail support without pain from knee or ankle  Ongoing     STG 3. Patient will increase hip and knee strength to 4+/5  Ongoing     LTG Date to Achieve: 01/26/2021    LTG 1. Patient will report no regular knee pain with ADLs  Ongoing     LTG 2 Patient will be independent with long term exercise plan   Ongoing     LTG 3: Patient will report decreased functional disability on KOS ADL score from 65 % ability to 80 % ability score   Ongoing         Plan  Therapy options: will be seen for skilled physical therapy services  Planned modality interventions: ultrasound, TENS, electrical stimulation/Russian stimulation and cryotherapy  Planned therapy interventions: balance/weight-bearing training, flexibility, functional ROM exercises, home exercise program, joint mobilization, therapeutic activities, stretching, strengthening, soft tissue mobilization, postural training, neuromuscular re-education, manual therapy, abdominal trunk stabilization, gait training and transfer training  Duration in visits: 20  Duration in weeks: 12  Treatment plan discussed with: patient           Recommendations: Continue as planned  Timeframe: 3 months  Prognosis to achieve goals: good    PT Signature: Heide Alvarez, PT      Based upon review of the patient's progress and continued therapy plan, it is my medical opinion that Cathy Gutiérrez should continue physical therapy treatment at [facility]  [address]   [place] [address] [phone]. office phone  [phone] office fax      Re-certification  Certification Period: 1/26/2021  I certify that the therapy services are furnished while this patient is under my care.  The services outlined above are required by this patient, and will be reviewed every 90 days.     PHYSICIAN: Ed Tony MD      DATE:     Signature: __________________________________  Ed Tony MD    Please sign and  return via fax to [phone]   Thank you, [facility].    Timed:  Manual Therapy:    0     mins  98673;  Therapeutic Exercise:    41     mins  92470;     Neuromuscular Narda:    2    mins  95589;    Therapeutic Activity:     0     mins  50794;     Gait Trainin     mins  48939;     Ultrasound:     0     mins  69179;    Electrical Stimulation:    0     mins  22628 ( );  Iontophoresis    0     mins 75397;  Orthotic Mgmt/training  0     mins 43624;  Orthotic check out  0     mins 24926;  Canalith Repositioning  0     mins 26491;  Aquatic Therapy    0     mins 94111;  Dry Needling              0     mins    Untimed:  Electrical Stimulation:        mins  29691 ( );  Mechanical Traction:         mins  04744;     Timed Treatment:   43   mins   Total Treatment:     43   mins

## 2020-10-30 ENCOUNTER — TREATMENT (OUTPATIENT)
Dept: PHYSICAL THERAPY | Facility: CLINIC | Age: 65
End: 2020-10-30

## 2020-10-30 DIAGNOSIS — M25.561 ACUTE PAIN OF BOTH KNEES: Primary | ICD-10-CM

## 2020-10-30 DIAGNOSIS — M25.562 ACUTE PAIN OF BOTH KNEES: Primary | ICD-10-CM

## 2020-10-30 DIAGNOSIS — M25.571 ACUTE RIGHT ANKLE PAIN: ICD-10-CM

## 2020-10-30 DIAGNOSIS — Z78.9 DIFFICULTY NAVIGATING STAIRS: ICD-10-CM

## 2020-10-30 PROCEDURE — 97110 THERAPEUTIC EXERCISES: CPT | Performed by: PHYSICAL THERAPIST

## 2020-10-30 NOTE — PROGRESS NOTES
Physical Therapy Daily Progress Note    Patient: Cathy Gutiérrez   : 1955  Diagnosis/ICD-10 Code:  Acute pain of both knees [M25.561, M25.562]  Referring practitioner: Ed Tony MD  Date of Initial Visit: Type: THERAPY  Noted: 10/8/2020  Today's Date: 10/30/2020  Patient seen for 6 sessions           Subjective Knee is a little sore walking in today on L side.    Objective   See Exercise, Manual, and Modality Logs for complete treatment.       Assessment/Plan  Balance is not as stable standing on L leg with L knee varus deviation and L ankle more reactive and more stable on R knee and ankle. No pain limiting to exercises.        Timed:    Manual Therapy:    0     mins  68818;  Therapeutic Exercise:    40     mins  86835;     Neuromuscular Narda:    5    mins  28145;    Therapeutic Activity:     0     mins  96776;     Gait Trainin     mins  33146;     Ultrasound:     0     mins  67958;    Electrical Stimulation:    0     mins  70948 ( );  Iontophoresis    0     mins 74564;  Aquatic Therapy    0     mins 20028;  Dry Needling              0     mins    Untimed:  Electrical Stimulation:    0     mins  11711 (MC );  Mechanical Traction:    0     mins  17376;     Timed Treatment:   45   mins   Total Treatment:     45   mins  Heide Alvarez PT  Physical Therapist

## 2020-11-04 ENCOUNTER — TREATMENT (OUTPATIENT)
Dept: PHYSICAL THERAPY | Facility: CLINIC | Age: 65
End: 2020-11-04

## 2020-11-04 DIAGNOSIS — M25.562 ACUTE PAIN OF BOTH KNEES: Primary | ICD-10-CM

## 2020-11-04 DIAGNOSIS — M25.571 ACUTE RIGHT ANKLE PAIN: ICD-10-CM

## 2020-11-04 DIAGNOSIS — M25.561 ACUTE PAIN OF BOTH KNEES: Primary | ICD-10-CM

## 2020-11-04 PROCEDURE — 97110 THERAPEUTIC EXERCISES: CPT | Performed by: PHYSICAL THERAPIST

## 2020-11-04 NOTE — PROGRESS NOTES
Physical Therapy Daily Progress Note    Patient: Cathy Gutiérrez   : 1955  Diagnosis/ICD-10 Code:  Acute pain of both knees [M25.561, M25.562]  Referring practitioner: Ed Tony MD  Date of Initial Visit: Type: THERAPY  Noted: 10/8/2020  Today's Date: 2020  Patient seen for 7 sessions           Subjective My knees and ankle feel pretty good after 6 hour drive both ways     Objective   See Exercise, Manual, and Modality Logs for complete treatment.       Assessment/Plan  Better balance on B legs today.  Mild L knee symptom on second set of clock exercise.            Timed:    Manual Therapy:    0     mins  57569;  Therapeutic Exercise:    38     mins  14565;     Neuromuscular Narda:    2    mins  68900;    Therapeutic Activity:     0     mins  34424;     Gait Trainin     mins  65659;     Ultrasound:     0     mins  67789;    Electrical Stimulation:    0     mins  55661 ( );  Iontophoresis    0     mins 29496;  Aquatic Therapy    0     mins 47959;  Dry Needling              0     mins    Untimed:  Electrical Stimulation:    0     mins  24978 ( );  Mechanical Traction:    0     mins  80051;     Timed Treatment:   40   mins   Total Treatment:     40   mins  Heide Alvarez PT  Physical Therapist

## 2020-11-13 ENCOUNTER — TREATMENT (OUTPATIENT)
Dept: PHYSICAL THERAPY | Facility: CLINIC | Age: 65
End: 2020-11-13

## 2020-11-13 DIAGNOSIS — M25.561 ACUTE PAIN OF BOTH KNEES: Primary | ICD-10-CM

## 2020-11-13 DIAGNOSIS — Z78.9 DIFFICULTY NAVIGATING STAIRS: ICD-10-CM

## 2020-11-13 DIAGNOSIS — M25.571 ACUTE RIGHT ANKLE PAIN: ICD-10-CM

## 2020-11-13 DIAGNOSIS — M25.562 ACUTE PAIN OF BOTH KNEES: Primary | ICD-10-CM

## 2020-11-13 PROCEDURE — 97112 NEUROMUSCULAR REEDUCATION: CPT | Performed by: PHYSICAL THERAPIST

## 2020-11-13 PROCEDURE — 97110 THERAPEUTIC EXERCISES: CPT | Performed by: PHYSICAL THERAPIST

## 2020-11-13 NOTE — PROGRESS NOTES
Physical Therapy Daily Progress Note    Patient: Cathy Gutiérrez   : 1955  Diagnosis/ICD-10 Code:  Acute pain of both knees [M25.561, M25.562]  Referring practitioner: Ed Tony MD  Date of Initial Visit: Type: THERAPY  Noted: 10/8/2020  Today's Date: 2020  Patient seen for 8 sessions           Subjective My knees and ankle feel pretty good.  I have not done as much this week.    Objective   See Exercise, Manual, and Modality Logs for complete treatment.       Assessment/Plan  L knee shows increased varus more than the R side that causes the L foot and ankle to supinate more and the L knee and ankle are not as stable with balance.   Plan to increase ankle strengthening exercises.           Timed:    Manual Therapy:    0     mins  90186;  Therapeutic Exercise:    32     mins  00237;     Neuromuscular Narda:    10    mins  82817;    Therapeutic Activity:     0     mins  66419;     Gait Trainin     mins  38971;     Ultrasound:     0     mins  80032;    Electrical Stimulation:    0     mins  77726 ( );  Iontophoresis    0     mins 77100;  Aquatic Therapy    0     mins 24892;  Dry Needling              0     mins    Untimed:  Electrical Stimulation:    0     mins  73170 (MC );  Mechanical Traction:    0     mins  17935;     Timed Treatment:   42   mins   Total Treatment:     42   mins  Heide Alvarez PT  Physical Therapist

## 2020-11-18 ENCOUNTER — TREATMENT (OUTPATIENT)
Dept: PHYSICAL THERAPY | Facility: CLINIC | Age: 65
End: 2020-11-18

## 2020-11-18 DIAGNOSIS — M25.562 ACUTE PAIN OF BOTH KNEES: Primary | ICD-10-CM

## 2020-11-18 DIAGNOSIS — M25.571 ACUTE RIGHT ANKLE PAIN: ICD-10-CM

## 2020-11-18 DIAGNOSIS — M25.561 ACUTE PAIN OF BOTH KNEES: Primary | ICD-10-CM

## 2020-11-18 DIAGNOSIS — Z78.9 DIFFICULTY NAVIGATING STAIRS: ICD-10-CM

## 2020-11-18 PROCEDURE — 97110 THERAPEUTIC EXERCISES: CPT | Performed by: PHYSICAL THERAPIST

## 2020-11-18 PROCEDURE — 97112 NEUROMUSCULAR REEDUCATION: CPT | Performed by: PHYSICAL THERAPIST

## 2020-11-18 NOTE — PROGRESS NOTES
30-Day / 10-Visit Progress Note         Patient: Cathy Gutiérrez   : 1955  Diagnosis/ICD-10 Code:  Acute pain of both knees [M25.561, M25.562]  Referring practitioner: Ed Tony MD  Date of Initial Visit: Type: THERAPY  Noted: 10/8/2020  Today's Date: 2020  Patient seen for 9 sessions      Subjective:     Clinical Progress: improved  Home Program Compliance: Yes  Treatment has included:  therapeutic exercise, neuro-muscular retraining  and patient education with home exercise program     Subjective I feel pretty good   Objective          Static Posture     Knee   Genu valgus.     Palpation     Additional Palpation Details  Not tender to palpation around ankle    Active Range of Motion   Left Ankle/Foot   Normal active range of motion    Right Ankle/Foot   Normal active range of motion    Strength/Myotome Testing     Left Ankle/Foot   Dorsiflexion: 5  Plantar flexion: 4+  Inversion: 5  Eversion: 4+    Right Ankle/Foot   Dorsiflexion: 5  Plantar flexion: 4+  Inversion: 5  Eversion: 5    Tests   Left Ankle/Foot   Negative for anterior drawer, posterior drawer, valgus tilt and varus tilt.     Right Ankle/Foot   Negative for anterior drawer, posterior drawer, valgus tilt and varus tilt.     Functional Assessment     Single Leg Stance   Left: 30 seconds  Right: 30 seconds      See Exercise, Manual, and Modality Logs for complete treatment.           Assessment & Plan     Assessment  Impairments: abnormal gait, abnormal muscle tone, activity intolerance, impaired balance, impaired physical strength, lacks appropriate home exercise program and pain with function  Assessment details: Cathy Gutiérrez is a 65 y.o. year-old female referred to physical therapy for left knee pain after strain and right ankle pain. She has been seen for 9 physical therapy sessions and is progressing well. She has no current pain.  She demonstrates increased strength and balance with exercises.  She has some core weakness  on the right side and increased genu valgus on the left knee that cause some imbalance with exercises.   Prognosis: good  Functional Limitations: walking, uncomfortable because of pain, standing and unable to perform repetitive tasks  Goals  Plan Goals: PT Goal Recert Due Date: 01/06/2021    STG Date to Achieve: 11/19/2021     STG 1.  Patient will demonstrate an independent HEP for knee strength and ROM/flexibility with good compliance  Partially met     STG 2 Patient will negotiate stairs reciprocally with light rail support without pain from knee or ankle  Met     STG 3. Patient will increase hip and knee strength to 4+/5  Progressing     LTG Date to Achieve: 01/06/2021    LTG 1. Patient will report no regular knee pain with ADLs  Progressing     LTG 2 Patient will be independent with long term exercise plan   Progressing     LTG 3: Patient will report decreased functional disability on KOS ADL score from 65 % ability to 80 % ability score   Ongoing         Plan  Therapy options: will be seen for skilled physical therapy services  Planned modality interventions: ultrasound, TENS, electrical stimulation/Russian stimulation and cryotherapy  Planned therapy interventions: balance/weight-bearing training, flexibility, functional ROM exercises, home exercise program, joint mobilization, therapeutic activities, stretching, strengthening, soft tissue mobilization, postural training, neuromuscular re-education, manual therapy, abdominal trunk stabilization, gait training and transfer training  Duration in visits: 20  Duration in weeks: 12  Treatment plan discussed with: patient           Recommendations: Continue as planned  Timeframe: 6 weeks  Prognosis to achieve goals: good    PT Signature: Heide Alvarez, PT      Based upon review of the patient's progress and continued therapy plan, it is my medical opinion that Cathy Gutiérrez should continue physical therapy treatment at [facility]  [facility]  [address] [place] [address] [place] [address]  [phone].    Signature: __________________________________  Ed Tony MD    Timed:  Manual Therapy:    0     mins  92961;  Therapeutic Exercise:    30     mins  60648;     Neuromuscular Narda:    13    mins  26656;    Therapeutic Activity:     0     mins  38761;     Gait Trainin     mins  61198;     Ultrasound:     0     mins  03578;    Electrical Stimulation:    0     mins  37660 ( );  Iontophoresis    0     mins 76904;  Dry Needling              0     mins    Untimed:  Electrical Stimulation:    0     mins  25027 ( );  Mechanical Traction:    0     mins  20976;     Timed Treatment:   43   mins   Total Treatment:     43   mins

## 2020-11-24 ENCOUNTER — TREATMENT (OUTPATIENT)
Dept: PHYSICAL THERAPY | Facility: CLINIC | Age: 65
End: 2020-11-24

## 2020-11-24 DIAGNOSIS — M25.562 ACUTE PAIN OF BOTH KNEES: Primary | ICD-10-CM

## 2020-11-24 DIAGNOSIS — Z78.9 DIFFICULTY NAVIGATING STAIRS: ICD-10-CM

## 2020-11-24 DIAGNOSIS — M25.561 ACUTE PAIN OF BOTH KNEES: Primary | ICD-10-CM

## 2020-11-24 DIAGNOSIS — M25.571 ACUTE RIGHT ANKLE PAIN: ICD-10-CM

## 2020-11-24 PROCEDURE — 97110 THERAPEUTIC EXERCISES: CPT | Performed by: PHYSICAL THERAPIST

## 2020-11-24 NOTE — PROGRESS NOTES
Physical Therapy Daily Progress Note    Patient: Cathy Gutiérrez   : 1955  Diagnosis/ICD-10 Code:  Acute pain of both knees [M25.561, M25.562]  Referring practitioner: Ed Tony MD  Date of Initial Visit: Type: THERAPY  Noted: 10/8/2020  Today's Date: 2020  Patient seen for 10 sessions           Subjective I get occasional twinges of pain from L knee     Objective   See Exercise, Manual, and Modality Logs for complete treatment.       Assessment/Plan  Balance more challenged on L knee with increased valgus deviation and L ankle more supinated. L knee still not as consistently stable with single leg balance            Timed:    Manual Therapy:    0     mins  01264;  Therapeutic Exercise:    38     mins  63705;     Neuromuscular Narda:    5    mins  67294;    Therapeutic Activity:     0     mins  80501;     Gait Trainin     mins  47043;     Ultrasound:     0     mins  42712;    Electrical Stimulation:    0     mins  96344 (MC );  Iontophoresis    0     mins 60417;  Aquatic Therapy    0     mins 28914;  Dry Needling              0     mins    Untimed:  Electrical Stimulation:    0     mins  39128 (MC );  Mechanical Traction:    0     mins  11537;     Timed Treatment:   43   mins   Total Treatment:     43   mins  Heide Alvarez PT  Physical Therapist

## 2020-12-01 ENCOUNTER — TREATMENT (OUTPATIENT)
Dept: PHYSICAL THERAPY | Facility: CLINIC | Age: 65
End: 2020-12-01

## 2020-12-01 DIAGNOSIS — M25.571 ACUTE RIGHT ANKLE PAIN: ICD-10-CM

## 2020-12-01 DIAGNOSIS — Z78.9 DIFFICULTY NAVIGATING STAIRS: ICD-10-CM

## 2020-12-01 DIAGNOSIS — M25.561 ACUTE PAIN OF BOTH KNEES: Primary | ICD-10-CM

## 2020-12-01 DIAGNOSIS — M25.562 ACUTE PAIN OF BOTH KNEES: Primary | ICD-10-CM

## 2020-12-01 PROCEDURE — 97110 THERAPEUTIC EXERCISES: CPT | Performed by: PHYSICAL THERAPIST

## 2020-12-01 NOTE — PROGRESS NOTES
Physical Therapy Daily Progress Note    Patient: Cathy Gutiérrez   : 1955  Diagnosis/ICD-10 Code:  Acute pain of both knees [M25.561, M25.562]  Referring practitioner: Ed Tony MD  Date of Initial Visit: Type: THERAPY  Noted: 10/8/2020  Today's Date: 2020  Patient seen for 11 sessions           Subjective I felt some pain in the L knee while standing in the kitchen on giving without shoes on     Objective   See Exercise, Manual, and Modality Logs for complete treatment.       Assessment/Plan  Improved balance demonstrated on rocker board in both direction for ankle and knee however then standing clock exercises is more difficult and fatiguing to both legs at end            Timed:    Manual Therapy:    0     mins  14287;  Therapeutic Exercise:    38     mins  40130;     Neuromuscular Narda:    5    mins  04794;    Therapeutic Activity:     0     mins  89137;     Gait Trainin     mins  54499;     Ultrasound:     0     mins  63965;    Electrical Stimulation:    0     mins  26386 ( );  Iontophoresis    0     mins 18134;  Aquatic Therapy    0     mins 93729;  Dry Needling              0     mins    Untimed:  Electrical Stimulation:    0     mins  45152 ( );  Mechanical Traction:    0     mins  95171;     Timed Treatment:   43   mins   Total Treatment:     43   mins  Heide Alvarez PT  Physical Therapist

## 2020-12-04 ENCOUNTER — TELEPHONE (OUTPATIENT)
Dept: ORTHOPEDICS | Facility: OTHER | Age: 65
End: 2020-12-04

## 2020-12-08 ENCOUNTER — TREATMENT (OUTPATIENT)
Dept: PHYSICAL THERAPY | Facility: CLINIC | Age: 65
End: 2020-12-08

## 2020-12-08 DIAGNOSIS — M25.561 ACUTE PAIN OF BOTH KNEES: Primary | ICD-10-CM

## 2020-12-08 DIAGNOSIS — Z78.9 DIFFICULTY NAVIGATING STAIRS: ICD-10-CM

## 2020-12-08 DIAGNOSIS — M25.571 ACUTE RIGHT ANKLE PAIN: ICD-10-CM

## 2020-12-08 DIAGNOSIS — M25.562 ACUTE PAIN OF BOTH KNEES: Primary | ICD-10-CM

## 2020-12-08 PROCEDURE — 97112 NEUROMUSCULAR REEDUCATION: CPT | Performed by: PHYSICAL THERAPIST

## 2020-12-08 PROCEDURE — 97110 THERAPEUTIC EXERCISES: CPT | Performed by: PHYSICAL THERAPIST

## 2020-12-08 NOTE — PROGRESS NOTES
Physical Therapy Daily Progress Note    Patient: Cathy Gutiérrez   : 1955  Diagnosis/ICD-10 Code:  Acute pain of both knees [M25.561, M25.562]  Referring practitioner: Ed Tony MD  Date of Initial Visit: Type: THERAPY  Noted: 10/8/2020  Today's Date: 2020  Patient seen for 12 sessions           Subjective My knees are a little sore maybe from sleeping    Objective   See Exercise, Manual, and Modality Logs for complete treatment.   Recommend sleep with pillow between knees  Discussed options for HEP (home gym) versus coming to the gym  May try walking on her treadmill but need to see if her back feels good with certain parameters of incline, speed, step length      Assessment/Plan  Some increased leg fatigue and more challenged balance by end of session.  Would benefit to see if balance exercises can be performed earlier in session.            Timed:    Manual Therapy:    0     mins  33996;  Therapeutic Exercise:    38     mins  60784;     Neuromuscular Narda:    8    mins  59223;    Therapeutic Activity:     0     mins  69404;     Gait Trainin     mins  40840;     Ultrasound:     0     mins  11837;    Electrical Stimulation:    0     mins  83092 ( );  Iontophoresis    0     mins 14139;  Aquatic Therapy    0     mins 32658;  Dry Needling              0     mins    Untimed:  Electrical Stimulation:    0     mins  19401 ( );  Mechanical Traction:    0     mins  68395;     Timed Treatment:   46   mins   Total Treatment:     46   mins  Heide Alvarez, PT  Physical Therapist

## 2020-12-16 ENCOUNTER — TREATMENT (OUTPATIENT)
Dept: PHYSICAL THERAPY | Facility: CLINIC | Age: 65
End: 2020-12-16

## 2020-12-16 DIAGNOSIS — M25.571 ACUTE RIGHT ANKLE PAIN: ICD-10-CM

## 2020-12-16 DIAGNOSIS — Z78.9 DIFFICULTY NAVIGATING STAIRS: ICD-10-CM

## 2020-12-16 DIAGNOSIS — M25.561 ACUTE PAIN OF BOTH KNEES: Primary | ICD-10-CM

## 2020-12-16 DIAGNOSIS — M25.562 ACUTE PAIN OF BOTH KNEES: Primary | ICD-10-CM

## 2020-12-16 PROCEDURE — 97110 THERAPEUTIC EXERCISES: CPT | Performed by: PHYSICAL THERAPIST

## 2020-12-16 NOTE — PROGRESS NOTES
Physical Therapy Daily Progress Note    Patient: Cathy Gutiérrez   : 1955  Diagnosis/ICD-10 Code:  Acute pain of both knees [M25.561, M25.562]  Referring practitioner: Ed Tony MD  Date of Initial Visit: Type: THERAPY  Noted: 10/8/2020  Today's Date: 2020  Patient seen for 13 sessions           Subjective My knees and hands are more sore in the colder weather     Objective   See Exercise, Manual, and Modality Logs for complete treatment.   Issued HEP for ankle/knee and gym exercise program card       Assessment/Plan  Little knee pain reported on leg press today. Patient reports no lasting pain or soreness post exercises.   Discussed patient possibly working with .            Timed:    Manual Therapy:    0     mins  92470;  Therapeutic Exercise:    38     mins  22334;     Neuromuscular Narda:    4    mins  61925;    Therapeutic Activity:     0     mins  18465;     Gait Trainin     mins  44058;     Ultrasound:     0     mins  83612;    Electrical Stimulation:    0     mins  21536 ( );  Iontophoresis    0     mins 64466;  Aquatic Therapy    0     mins 05732;  Dry Needling              0     mins    Untimed:  Electrical Stimulation:    0     mins  64472 (MC );  Mechanical Traction:    0     mins  93000;     Timed Treatment:   42   mins   Total Treatment:     42   mins  Heide Alvarez PT  Physical Therapist

## 2020-12-18 ENCOUNTER — TREATMENT (OUTPATIENT)
Dept: PHYSICAL THERAPY | Facility: CLINIC | Age: 65
End: 2020-12-18

## 2020-12-18 DIAGNOSIS — M25.571 ACUTE RIGHT ANKLE PAIN: ICD-10-CM

## 2020-12-18 DIAGNOSIS — M25.562 ACUTE PAIN OF BOTH KNEES: Primary | ICD-10-CM

## 2020-12-18 DIAGNOSIS — Z78.9 DIFFICULTY NAVIGATING STAIRS: ICD-10-CM

## 2020-12-18 DIAGNOSIS — M25.562 ACUTE PAIN OF LEFT KNEE: ICD-10-CM

## 2020-12-18 DIAGNOSIS — M25.561 ACUTE PAIN OF BOTH KNEES: Primary | ICD-10-CM

## 2020-12-18 PROCEDURE — 97112 NEUROMUSCULAR REEDUCATION: CPT | Performed by: PHYSICAL THERAPIST

## 2020-12-18 PROCEDURE — 97110 THERAPEUTIC EXERCISES: CPT | Performed by: PHYSICAL THERAPIST

## 2020-12-18 NOTE — PROGRESS NOTES
Physical Therapy Daily Progress and Discharge Note    Patient: Cathy Gutiérrez   : 1955  Diagnosis/ICD-10 Code:  Acute pain of both knees [M25.561, M25.562]  Referring practitioner: Ed Tony MD  Date of Initial Visit: Type: THERAPY  Noted: 10/8/2020  Today's Date: 2020  Patient seen for 14 sessions           Subjective I was a little sore in muscles after increasing the weights last time.     Objective          Strength/Myotome Testing     Left Hip   Planes of Motion   Flexion: 4+  Abduction: 4+    Right Hip   Planes of Motion   Flexion: 4+  Abduction: 4+    Left Knee   Flexion: 4+  Extension: 4+    Right Knee   Flexion: 4+  Extension: 4+    Left Ankle/Foot   Dorsiflexion: 5  Plantar flexion: 4+  Inversion: 4+  Eversion: 4+    Right Ankle/Foot   Dorsiflexion: 5  Plantar flexion: 4+  Inversion: 4+  Eversion: 4+    Functional Assessment     Single Leg Stance   Left: 20 seconds  Right: 20 seconds      See Exercise, Manual, and Modality Logs for complete treatment.       Assessment & Plan     Assessment  Assessment details: Cathy Gutiérrez is a 65 y.o. year-old female referred to physical therapy for left knee pain after strain and right ankle pain. She has been seen for 14 physical therapy sessions and has progressed well with rehab exercises to a HEP. She has no current pain.  She demonstrates increased strength and balance with exercises.  She still has some increased genu valgus on the left knee that cause deviated knee and foot posture. She is discharged to [facility].    Goals  Plan Goals: PT Goal Recert Due Date: 2021    STG Date to Achieve: 2021     STG 1.  Patient will demonstrate an independent HEP for knee strength and ROM/flexibility with good compliance  Met  STG 2 Patient will negotiate stairs reciprocally with light rail support without pain from knee or ankle  Met     STG 3. Patient will increase hip and knee strength to 4+/5  Met     LTG Date to Achieve:  2021    LTG 1. Patient will report no regular knee pain with ADLs  Met    LTG 2 Patient will be independent with long term exercise plan   Met issued home and gym exercise programs     LTG 3: Patient will report decreased functional disability on KOS ADL score from 65 % ability to 80 % ability score   Not reassessed                    Timed:    Manual Therapy:    0     mins  15881;  Therapeutic Exercise:    32     mins  83684;     Neuromuscular Narda:    10    mins  77099;    Therapeutic Activity:     0     mins  07031;     Gait Trainin     mins  39426;     Ultrasound:     0     mins  03235;    Electrical Stimulation:    0     mins  51603 ( );  Iontophoresis    0     mins 90123;  Aquatic Therapy    0     mins 48916;  Dry Needling              0     mins    Untimed:  Electrical Stimulation:    0     mins  36968 ( );  Mechanical Traction:    0     mins  11105;     Timed Treatment:   42   mins   Total Treatment:     42   mins  Heide Alvarez, PT  Physical Therapist

## 2020-12-23 ENCOUNTER — TRANSCRIBE ORDERS (OUTPATIENT)
Dept: ADMINISTRATIVE | Facility: HOSPITAL | Age: 65
End: 2020-12-23

## 2020-12-23 DIAGNOSIS — R91.8 LUNG NODULES: Primary | ICD-10-CM

## 2020-12-28 ENCOUNTER — OFFICE VISIT (OUTPATIENT)
Dept: INTERNAL MEDICINE | Facility: CLINIC | Age: 65
End: 2020-12-28

## 2020-12-28 VITALS — TEMPERATURE: 97.1 F | WEIGHT: 148 LBS | HEIGHT: 67 IN | BODY MASS INDEX: 23.23 KG/M2

## 2020-12-28 DIAGNOSIS — IMO0001 LUNG NODULE < 6CM ON CT: Primary | ICD-10-CM

## 2020-12-28 DIAGNOSIS — Z23 NEED FOR INFLUENZA VACCINATION: ICD-10-CM

## 2020-12-28 DIAGNOSIS — Z11.59 ENCOUNTER FOR HEPATITIS C SCREENING TEST FOR LOW RISK PATIENT: ICD-10-CM

## 2020-12-28 DIAGNOSIS — F31.9 BIPOLAR 1 DISORDER (HCC): ICD-10-CM

## 2020-12-28 DIAGNOSIS — I10 ESSENTIAL HYPERTENSION: ICD-10-CM

## 2020-12-28 DIAGNOSIS — E55.9 VITAMIN D DEFICIENCY: ICD-10-CM

## 2020-12-28 DIAGNOSIS — F10.10 ALCOHOL ABUSE: ICD-10-CM

## 2020-12-28 DIAGNOSIS — M81.0 OSTEOPOROSIS WITHOUT CURRENT PATHOLOGICAL FRACTURE, UNSPECIFIED OSTEOPOROSIS TYPE: ICD-10-CM

## 2020-12-28 PROBLEM — C73 PAPILLARY THYROID CARCINOMA (HCC): Status: ACTIVE | Noted: 2020-12-28

## 2020-12-28 PROBLEM — M18.12 OSTEOARTHRITIS OF LEFT THUMB: Status: RESOLVED | Noted: 2018-04-16 | Resolved: 2020-12-28

## 2020-12-28 PROCEDURE — 99204 OFFICE O/P NEW MOD 45 MIN: CPT | Performed by: INTERNAL MEDICINE

## 2020-12-28 PROCEDURE — 90670 PCV13 VACCINE IM: CPT | Performed by: INTERNAL MEDICINE

## 2020-12-28 PROCEDURE — 90471 IMMUNIZATION ADMIN: CPT | Performed by: INTERNAL MEDICINE

## 2020-12-28 RX ORDER — IRBESARTAN AND HYDROCHLOROTHIAZIDE 300; 12.5 MG/1; MG/1
1 TABLET, FILM COATED ORAL DAILY
COMMUNITY
Start: 2020-11-23 | End: 2021-02-22

## 2020-12-28 NOTE — PROGRESS NOTES
Assessment and Plan  Diagnoses and all orders for this visit:    1. Lung nodule < 6cm on CT (Primary)  Comments:  has f/u with pulmonary  Orders:  -     Vitamin D 25 Hydroxy; Future    2. Essential hypertension  Comments:  a bit elevated today but she is not sure she has been taking meds and is admitedly drinking too much.  will have her check at home and f/u with me soon.   Orders:  -     CBC & Differential; Future  -     Comprehensive Metabolic Panel; Future  -     Lipid Panel With / Chol / HDL Ratio; Future    3. Bipolar 1 disorder (CMS/HCC)  Comments:  stressed importance to be honest with Dr. Jones about her alcohol abuse.    4. Vitamin D deficiency  Comments:  remain on replacement    5. Encounter for hepatitis C screening test for low risk patient  Comments:  for f/u  Orders:  -     Hepatitis C Antibody; Future    6. Osteoporosis without current pathological fracture, unspecified osteoporosis type  Comments:  check Dexa- was treated in the past but her report.   Orders:  -     DEXA Bone Density Axial    7. Need for influenza vaccination  -     Pneumococcal Conjugate Vaccine 13-Valent All (PCV13)    8. Alcohol abuse  Comments:  lengthy discussion- more than 50% of the time- stressed her needing to get help- she has considered in the past.  Will f/u with her.       F/U and Patient Instructions    Return in about 2 months (around 2/28/2021) for Annual physical, Lab Before FUP.  There are no Patient Instructions on file for this visit.    Subjective    Cathy Gutiérrez is a 65 y.o. female being seen in our office today for Hypertension     History of the Present Illness  HPI  Here to establish- she has a strong history of alcohol abuse in her family and has dealt with it in the past- went to AA and was sober for one year but went back to alcohol. Lately, she has been drinking more -admits to 5 drinks/day.     Patient History        Significant Past History  The following portions of the patient's history  were reviewed and updated as appropriate:PMHroutine: Social history , Past Medical History, Surgical history , Allergies, Current Medications, Active Problem List, Family History and Health Maintenance              Social History  She  reports that she has never smoked. She has never used smokeless tobacco. She reports current alcohol use. She reports that she does not use drugs.                         Review of Symptoms  Review of Systems   Constitution: Weight loss: had gained on Vrylar but has gone back down to 149 lbs.   Cardiovascular: Negative for chest pain and dyspnea on exertion.   Respiratory: Negative for cough.    Musculoskeletal: Negative for arthritis.   Neurological: Negative for excessive daytime sleepiness and dizziness.   Psychiatric/Behavioral: Positive for substance abuse. Depression: under treatment with Dr. Jones.     Objective  Vital Signs         BP Readings from Last 1 Encounters:   07/09/20 147/75     Wt Readings from Last 3 Encounters:   12/28/20 67.1 kg (148 lb)   10/21/20 70.8 kg (156 lb)   09/22/20 64.4 kg (142 lb)   Body mass index is 23.18 kg/m².          Physical Exam   Physical Exam  Constitutional:       Appearance: Normal appearance.   Cardiovascular:      Rate and Rhythm: Normal rate and regular rhythm.   Musculoskeletal:      Right lower leg: No edema.      Left lower leg: No edema.       Data Reviewed    No results found for this or any previous visit (from the past 2016 hour(s)).

## 2020-12-29 ENCOUNTER — TELEPHONE (OUTPATIENT)
Dept: GASTROENTEROLOGY | Facility: CLINIC | Age: 65
End: 2020-12-29

## 2020-12-29 DIAGNOSIS — K63.5 POLYP OF COLON, UNSPECIFIED PART OF COLON, UNSPECIFIED TYPE: Primary | ICD-10-CM

## 2020-12-29 NOTE — TELEPHONE ENCOUNTER
Last colonoscopy 01/11/2016, last EGD 09/06/2019-- personal hx of polyps-- father hx of polyps-- no ASA or blood thinners-- medications:      amphetamine-dextroamphetamine (ADDERALL) 10 MG tablet     estradiol (MINIVELLE, VIVELLE-DOT) 0.1 MG/24HR patch     irbesartan-hydrochlorothiazide (AVALIDE) 300-12.5 MG tablet     LamoTRIgine  MG tablet sustained-release 24 hour     liothyronine (CYTOMEL) 5 MCG tablet     SYNTHROID 125 MCG tablet     Ativan PRN            OA form and last colonoscopy scanned into media, last EGD in epic     Thank you

## 2021-01-08 PROBLEM — K63.5 POLYP OF COLON: Status: ACTIVE | Noted: 2021-01-08

## 2021-01-20 ENCOUNTER — TRANSCRIBE ORDERS (OUTPATIENT)
Dept: ADMINISTRATIVE | Facility: HOSPITAL | Age: 66
End: 2021-01-20

## 2021-01-20 DIAGNOSIS — Z01.818 OTHER SPECIFIED PRE-OPERATIVE EXAMINATION: Primary | ICD-10-CM

## 2021-01-27 ENCOUNTER — TELEPHONE (OUTPATIENT)
Dept: GASTROENTEROLOGY | Facility: CLINIC | Age: 66
End: 2021-01-27

## 2021-01-27 NOTE — TELEPHONE ENCOUNTER
----- Message from Lakesha Lin sent at 1/27/2021 11:12 AM EST -----  Regarding: pantoprazole (PROTONIX) 40 MG EC tablet  Contact: [phone]  PT has an appt 02/17. PT is completley out of meds and is starting to having complications please fill medication         pantoprazole (PROTONIX) 40 MG EC tablet      Pharmacy:  [id number] [address] AT [facility] - [phone]  - [phone] FX  Phone:  [phone]  Fax:  [phone]  Address:  [address] [place] [address]  Pharmacy Comments

## 2021-01-27 NOTE — TELEPHONE ENCOUNTER
See note of 9/19/19.     Pt for c/s on 2/2/21  Will be due for EGD 9/6/21.     Escribe initiated for pantoprazole 40 mg 1 tab po daily, #30, R5 - message to Dr Kelley.

## 2021-01-28 RX ORDER — PANTOPRAZOLE SODIUM 40 MG/1
40 TABLET, DELAYED RELEASE ORAL DAILY
Qty: 30 TABLET | Refills: 5 | Status: SHIPPED | OUTPATIENT
Start: 2021-01-28 | End: 2021-01-29

## 2021-01-29 RX ORDER — PANTOPRAZOLE SODIUM 40 MG/1
40 TABLET, DELAYED RELEASE ORAL DAILY
Qty: 30 TABLET | Refills: 4 | Status: SHIPPED | OUTPATIENT
Start: 2021-01-29 | End: 2021-08-25 | Stop reason: SDUPTHER

## 2021-01-30 ENCOUNTER — LAB (OUTPATIENT)
Dept: LAB | Facility: HOSPITAL | Age: 66
End: 2021-01-30

## 2021-01-30 DIAGNOSIS — Z01.818 OTHER SPECIFIED PRE-OPERATIVE EXAMINATION: ICD-10-CM

## 2021-01-30 PROCEDURE — U0004 COV-19 TEST NON-CDC HGH THRU: HCPCS

## 2021-01-30 PROCEDURE — C9803 HOPD COVID-19 SPEC COLLECT: HCPCS

## 2021-02-01 LAB — SARS-COV-2 RNA RESP QL NAA+PROBE: NOT DETECTED

## 2021-02-02 ENCOUNTER — ANESTHESIA (OUTPATIENT)
Dept: GASTROENTEROLOGY | Facility: HOSPITAL | Age: 66
End: 2021-02-02

## 2021-02-02 ENCOUNTER — HOSPITAL ENCOUNTER (OUTPATIENT)
Facility: HOSPITAL | Age: 66
Setting detail: HOSPITAL OUTPATIENT SURGERY
Discharge: HOME | End: 2021-02-02
Attending: INTERNAL MEDICINE | Admitting: INTERNAL MEDICINE

## 2021-02-02 ENCOUNTER — ANESTHESIA EVENT (OUTPATIENT)
Dept: GASTROENTEROLOGY | Facility: HOSPITAL | Age: 66
End: 2021-02-02

## 2021-02-02 VITALS
RESPIRATION RATE: 16 BRPM | BODY MASS INDEX: 24.11 KG/M2 | SYSTOLIC BLOOD PRESSURE: 166 MMHG | DIASTOLIC BLOOD PRESSURE: 69 MMHG | HEART RATE: 59 BPM | HEIGHT: 67 IN | WEIGHT: 153.6 LBS | OXYGEN SATURATION: 100 %

## 2021-02-02 DIAGNOSIS — K63.5 POLYP OF COLON, UNSPECIFIED PART OF COLON, UNSPECIFIED TYPE: ICD-10-CM

## 2021-02-02 PROCEDURE — 45378 DIAGNOSTIC COLONOSCOPY: CPT | Performed by: INTERNAL MEDICINE

## 2021-02-02 PROCEDURE — S0260 H&P FOR SURGERY: HCPCS | Performed by: INTERNAL MEDICINE

## 2021-02-02 PROCEDURE — 25010000002 PROPOFOL 10 MG/ML EMULSION: Performed by: ANESTHESIOLOGY

## 2021-02-02 RX ORDER — LIDOCAINE HYDROCHLORIDE 20 MG/ML
INJECTION, SOLUTION INFILTRATION; PERINEURAL AS NEEDED
Status: DISCONTINUED | OUTPATIENT
Start: 2021-02-02 | End: 2021-02-02 | Stop reason: SURG

## 2021-02-02 RX ORDER — PROPOFOL 10 MG/ML
VIAL (ML) INTRAVENOUS AS NEEDED
Status: DISCONTINUED | OUTPATIENT
Start: 2021-02-02 | End: 2021-02-02 | Stop reason: SURG

## 2021-02-02 RX ORDER — PROMETHAZINE HYDROCHLORIDE 25 MG/1
25 TABLET ORAL ONCE AS NEEDED
Status: DISCONTINUED | OUTPATIENT
Start: 2021-02-02 | End: 2021-02-02 | Stop reason: HOSPADM

## 2021-02-02 RX ORDER — PROPOFOL 10 MG/ML
VIAL (ML) INTRAVENOUS CONTINUOUS PRN
Status: DISCONTINUED | OUTPATIENT
Start: 2021-02-02 | End: 2021-02-02 | Stop reason: SURG

## 2021-02-02 RX ORDER — SODIUM CHLORIDE, SODIUM LACTATE, POTASSIUM CHLORIDE, CALCIUM CHLORIDE 600; 310; 30; 20 MG/100ML; MG/100ML; MG/100ML; MG/100ML
1000 INJECTION, SOLUTION INTRAVENOUS CONTINUOUS
Status: DISCONTINUED | OUTPATIENT
Start: 2021-02-02 | End: 2021-02-02 | Stop reason: HOSPADM

## 2021-02-02 RX ORDER — PROMETHAZINE HYDROCHLORIDE 25 MG/1
25 SUPPOSITORY RECTAL ONCE AS NEEDED
Status: DISCONTINUED | OUTPATIENT
Start: 2021-02-02 | End: 2021-02-02 | Stop reason: HOSPADM

## 2021-02-02 RX ADMIN — LIDOCAINE HYDROCHLORIDE 40 MG: 20 INJECTION, SOLUTION INFILTRATION; PERINEURAL at 14:05

## 2021-02-02 RX ADMIN — PROPOFOL 100 MG: 10 INJECTION, EMULSION INTRAVENOUS at 14:05

## 2021-02-02 RX ADMIN — SODIUM CHLORIDE, POTASSIUM CHLORIDE, SODIUM LACTATE AND CALCIUM CHLORIDE 1000 ML: 600; 310; 30; 20 INJECTION, SOLUTION INTRAVENOUS at 13:45

## 2021-02-02 RX ADMIN — PROPOFOL 250 MCG/KG/MIN: 10 INJECTION, EMULSION INTRAVENOUS at 14:01

## 2021-02-02 NOTE — DISCHARGE INSTRUCTIONS
For the next 24 hours patient needs to be with a responsible adult.    For 24 hours DO NOT drive, operate machinery, appliances, drink alcohol, make important decisions or sign legal documents.    Start with a light or bland diet if you are feeling sick to your stomach otherwise advance to regular diet as tolerated.    Follow recommendations on procedure report if provided by your doctor.    Call Dr Kelley for problems [phone]    Problems may include but not limited to: large amounts of bleeding, trouble breathing, repeated vomiting, severe unrelieved pain, fever or chills.        WHAT ARE DIVERTICULOSIS AND DIVERTICULITIS?  Many people have small pouches in their colons that bulge outward through weak spots, like an inner tube that pokes through weak places in a tire.  Each pouch is called a diverticulum.  The condition of having diverticula is DIVERTICULOSIS.  The condition becomes more common as people age.  About half of all people over the age of 60 have diverticulosis    When pouches become infected or inflamed, the condition is called DIVERTICULITIS.  This happens in 10% to 25% of people with diverticulosis.  Diverticulosis and diverticulitis are also called DIVERTICULAR DISEASE.     WHAT ARE THE SYMPTOMS?  Diverticulosis - Most people do not have any discomfort or symptoms.  However, symptoms may include mild cramps, bloating, and constipation.  Other diseases such as irritable bowel syndrome (IBS) and stomach ulcers cause similar problems, so these symptoms do not always mean a person has diverticulosis.  You should visit your doctor if you have these troubling symptoms.    Diverticulitis - The most common symptom is abdominal pain.  The most common sign is tenderness around the left side of the lower abdomen.  If infection is the cause, fever, nausea, vomiting, chills, cramping, and constipation may occur as well.  The severity depends on the extent of the infection and complications.    WHAT ARE  THE COMPLICATIONS?  Diverticulitis can lead to bleeding, infections,perforations or tears, or blockages.  These complications always require treatment to prevent them from proggressing and causing serous illness.    Bleeding from a diverticula is a rare complication.  When this occurs, blood may appear in the toilet or in your stool.  Bleeding can be severe, but it may stop by itself and not require treatment.  Doctors believe bleeding diverticula are caused by a small blood vessel in a diverticulum that weakens and finally bursts.  If you have bleeding from the rectum, you should see your doctor.  If the bleeding does not stop you may need surgery.    Abscess, Perforation, and Peritonitis - The infection causing diverticulitis often clears up after a few days of treatment with antibiotics.  If the condition gets worse, an abscess may form in the colon.  An abscess is an infected area with pus that may cause swelling and destroy tissue.  Sometimes the infected diverticula may develop small holes, called perforations.  These perforations allow pus to leak out of the colon into the abdominal area.  If the abscess is small and remains in the colon, it may clear up after treatment with antibiotics.  If not, the doctor may need to drain it.  A large abscess can become a serious problem if the infection leaks out and contaminates areas outside the colon.  Infection that spreads into the abdominal cavity is called peritonitis.  Peritonitis requires immediate surgery toclean the abdominal cavity and remove the damaged part of the colon.  Without surgery, peritonitis can be fatal.    FISTULA  A fistula is an abnormal connection of tissue between two organs or between an organ and the skin.  When damaged tissues come into contact with each other during infection, they sometimes stick together.  If they heal that way, a fistula forms.  When diverticulitis-related infection spreads through out the colon, the colon's tissue may  stick to nearby tissues.  The organs usually involved are the bladder, small intestine, and skin.  The problem can be corrected with surgery to remove the fistula and affected part of the colon.    INTESTINAL OBSTRUCTION  The scarring caused by infection may cause partial or total blockage of the large intestine.  When this happens, the colon is unable to move bowel contents normally.  When the obstruction totally blocks the intestine, emergency surgery is necessary.  Partial blockage is not an emergency, so the surgery to correct it can be planned.    WHAT CAUSES DIVERTICULAR DISEASE  Although not proven, the dominant theory is that a low-fiber diet is the main cause of diverticular disease.  The disease was first noticed in the [place] in the early 1900s.  At about the same time, processed foods were introduced into the American diet.  Many processed foods contain refined, low-fiber flour.  Unlike whole-wheat flour, refined flour has no wheat bran.    Diverticular disease is common in developed or industrialized countries-particularly the [place], [place], and [place]-where low-fiber diets are common.  The disease is rare in countries of [place] and [place], where people eat high-fiber vegetable diets.    Fiber is the part of fruits, vegetables, and whole grains that the body cannot digest.  Some fiber dissolves easily in water (soluble fiber).  It takes on a soft, jelly-like texture in the intestines.  Some fiber passes almost unchanged through the intestines (insoluble fiber).  Both kinds of fiber help make stools soft and easy to pass.  Fiber also prevents constipation.    Constipation makes the muscles strain to move stool that is too hard.  It is the main cause of increased pressure in the colon.  This excess pressure might cause the weak spots in the colon to bulge out and become diverticula.  Diverticulitis occurs when diverticula become infected or inflamed.  Doctors are not certain what causes  the infection.  It may begin when stool or bacteria are caught in the diverticula.  An attack of diverticulitis can develop suddenly and without warning.    HOW DOES THE DOCTOR DIAGNOSE DIVERTICULAR DISEASE  The doctor asks about medical history, does a physical exam, and may perform one or more diagnostic tests.  Because most people do not have symptoms, diverticulosis is often found through tests ordered for another ailment.    When taking a medical history, the doctor may ask about bowel habits, symptoms, pain, diet, and medications.  The physical exam usually involves a digital rectal exam.  To preform this test. The doctor inserts a gloved, lubricated finger into the rectum to detect tenderness, blockage, or blood.  The doctor may check stool for signs of bleeding and test blood for signs of infection.  The doctor may also order x-rays or other tests.    WHAT IS THE TREATMENT FOR DIVERTICULAR DISEASE  Increasing the amount of fiber in the diet may reduce symptoms of diverticulosis and prevent complications such as diverticulitis.  Fiber keeps stool soft and lowers pressure inside the colon so that bowel contents can move through easily.  The American Dietetic Association. Recommends 20 to 35 grams of fiber each day.  The doctor may also recommend taking a fiber product such as Citrucel or Metamucil once a dya.  These products are mixed water and provide about 2 to 3.5 grams of fiber per  Tablespoon, mixed with 8 ounces of water.    Avoidance of nuts, popcorn, and sunflower, pumpkin, last, and sesame seeds has been recommended by physicians out of fear that food particles could enter, block, or irritate the diverticula.  However, no scientific data support this treatment measure.  Eating a high-fiber diet is the only requirement highly emphasized across the medical literature.  Eliminating specific foods is not necessary.  The seeds in tomatoes, zucchini, cucumbers, strawberries, and raspberries, as well as  poppy seeds, are generally considered harmless.  People differ in amounts and types of foods the can eat.  Decisions about diet should be made based on what works best for each person.  Keeping a food diary may help identify what foods may cause symptoms.    If cramps, bloating, and constipation are problems, the doctor may prescribe  Short course of pain medication.  However, many medications affect emptying of the colon, an undesirable side effect for people with diverticulosis.    DIVERTICULITIS  Treatment focuses on clearing up the infection and inflammation, resting the colon, and preventing or minimizing complications.  An attack of diverticulitis without complications may respond to antibiotics within a few days if treated early.  To help the colon rest, the doctor may recommend bed rest and a liquid diet, along with a pain reliever.    An acute attack with severe pain or sever infection may require a hospital stay.  Most acute cases of diverticulitis are treated with antibiotics and a liquid diet.  The antibiotics are given by injection into a vein.  In some cases, however, surgery may be necessary.    WHEN IS SURGERY NECESSARY  If attacks are severe or frequent, the doctor may advise surgery.  The surgeon removes the affected part of the colon and joins the remaining sections.  This typed of surgery, called colon resection, aims to keep attacks from coming back and to prevent complications.  The doctor may also recommend surgery for complications of a fistula or intestinal obstruction.    If antibiotics do not correct an attack, emergency surgery may be required.  Other reasons for emergency surgery include a large abscess, perforation, peritonitis, or continued bleeding.    Emergency surgery usually involves 2 operations.  The first will clear the infected abdominal cavity and remove part of the colon.  Because infection and sometimes obstruction, it is not safe to rejoin the colon during the first  operation.  Instead, the surgeon creates a temporary hole, or stoma, in the abdomen.  The end of the colon is connected to the hole, a procedure called a colostomy, to allow normal eating and bowel movements.  The stool goes into a bag attached to the opening in the abdomen.  In the second operation, the surgeon rejoins the ends of the colon.

## 2021-02-02 NOTE — ANESTHESIA POSTPROCEDURE EVALUATION
Patient: Cathy Gutiérrez    Procedure Summary     Date: 02/02/21 Room / Location:  [facility]    Anesthesia Start: 1401 Anesthesia Stop: 1426    Procedure: COLONOSCOPY INTO CECUM (N/A ) Diagnosis:       Polyp of colon, unspecified part of colon, unspecified type      (Polyp of colon, unspecified part of colon, unspecified type [K63.5])    Surgeon: Angelito Kelley MD Provider: Sj Frank MD    Anesthesia Type: MAC ASA Status: 2          Anesthesia Type: MAC    Vitals  No vitals data found for the desired time range.          Post Anesthesia Care and Evaluation    Patient location during evaluation: bedside  Patient participation: complete - patient participated  Level of consciousness: awake  Pain management: adequate  Airway patency: patent  Anesthetic complications: No anesthetic complications  PONV Status: none  Cardiovascular status: acceptable  Respiratory status: acceptable  Hydration status: acceptable  Post Neuraxial Block status: Motor and sensory function returned to baseline

## 2021-02-02 NOTE — ANESTHESIA POSTPROCEDURE EVALUATION
Patient: Cathy Gutiérrez    Procedure Summary     Date: 02/02/21 Room / Location:  [facility]    Anesthesia Start: 1401 Anesthesia Stop: 1426    Procedure: COLONOSCOPY INTO CECUM (N/A ) Diagnosis:       Diverticulosis      Tortuous colon      (Polyp of colon, unspecified part of colon, unspecified type [K63.5])    Surgeon: Angelito Kelley MD Provider: Sj Frank MD    Anesthesia Type: MAC ASA Status: 2          Anesthesia Type: MAC    Vitals  Vitals Value Taken Time   /69 02/02/21 1447   Temp     Pulse 59 02/02/21 1447   Resp 16 02/02/21 1447   SpO2 100 % 02/02/21 1447           Post Anesthesia Care and Evaluation    Patient location during evaluation: bedside  Patient participation: complete - patient participated  Level of consciousness: awake  Pain management: adequate  Airway patency: patent  Anesthetic complications: No anesthetic complications  PONV Status: none  Cardiovascular status: acceptable  Respiratory status: acceptable  Hydration status: acceptable  Post Neuraxial Block status: Motor and sensory function returned to baseline

## 2021-02-02 NOTE — H&P
[facility]  Pre Procedure History & Physical    Chief Complaint:   Polyps, family history    Subjective     HPI:   This 65-year-old female presents the endoscopy suite for colonoscopic evaluation.  She has a history of polyps as well as a family history of colon polyps.  Last colonoscopy performed 2016.    Past Medical History:   Past Medical History:   Diagnosis Date   • Arthritis    • Cancer (CMS/HCC)     throid   • Depression    • Disease of thyroid gland    • GERD (gastroesophageal reflux disease)    • Hypertension    • PONV (postoperative nausea and vomiting)    • Sleep apnea        Past Surgical History:  Past Surgical History:   Procedure Laterality Date   • BREAST BIOPSY     • BUNIONECTOMY Bilateral    • COLONOSCOPY  approx 2014    normal per pt   • DILATATION AND CURETTAGE     • ENDOSCOPY N/A 9/6/2019    Procedure: ESOPHAGOGASTRODUODENOSCOPY with cold biopsies and balloon dilitation size 15, 16.5, 18;  Surgeon: Angelito Kelley MD;  Location: [facility] END[facility];  Service: Gastroenterology   • EPIDURAL BLOCK     • HYSTERECTOMY     • THYROID SURGERY     • UPPER GASTROINTESTINAL ENDOSCOPY  approx 2014    pt doesn't recall        Family History:  Family History   Problem Relation Age of Onset   • Breast cancer Maternal Grandmother    • Breast cancer Paternal Grandmother    • Breast cancer Maternal Aunt    • Breast cancer Paternal Aunt    • Alcohol abuse Mother 43   • Heart disease Father    • Hyperlipidemia Father    • Parkinsonism Brother         bulbar supranuclear palsy       Social History:   reports that she has never smoked. She has never used smokeless tobacco. She reports current alcohol use. She reports that she does not use drugs.    Medications:   No medications prior to admission.       Allergies:  Latex    ROS:    Pertinent items are noted in HPI, all other systems reviewed and negative     Objective     There were no vitals taken for this visit.    Physical Exam    Constitutional: Pt is oriented to person, place, and time and well-developed, well-nourished, and in no distress.   Mouth/Throat: Oropharynx is clear and moist.   Neck: Normal range of motion.   Cardiovascular: Normal rate, regular rhythm and normal heart sounds.    Pulmonary/Chest: Effort normal and breath sounds normal.   Abdominal: Soft. Nontender  Skin: Skin is warm and dry.   Psychiatric: Mood, memory, affect and judgment normal.     Assessment/Plan     Diagnosis:  Polyps  Family history    Anticipated Surgical Procedure:  Colonoscopy    The risks, benefits, and alternatives of this procedure have been discussed with the patient or the responsible party- the patient understands and agrees to proceed.

## 2021-02-02 NOTE — ANESTHESIA PREPROCEDURE EVALUATION
Anesthesia Evaluation     Patient summary reviewed and Nursing notes reviewed   history of anesthetic complications: PONV  NPO Solid Status: > 8 hours  NPO Liquid Status: > 2 hours           Airway   Mallampati: I  TM distance: >3 FB  Neck ROM: full  No difficulty expected  Dental - normal exam     Pulmonary - normal exam   (+) sleep apnea on CPAP,   Cardiovascular - normal exam  Exercise tolerance: good (4-7 METS)    (+) hypertension 2 medications or greater,       Neuro/Psych  (+) psychiatric history,     GI/Hepatic/Renal/Endo    (+)  GERD,  thyroid problem hypothyroidism    Musculoskeletal     Abdominal  - normal exam    Bowel sounds: normal.   Substance History - negative use     OB/GYN negative ob/gyn ROS         Other   arthritis,    history of cancer remission                    Anesthesia Plan    ASA 2     MAC       Anesthetic plan, all risks, benefits, and alternatives have been provided, discussed and informed consent has been obtained with: patient.

## 2021-02-22 ENCOUNTER — TELEPHONE (OUTPATIENT)
Dept: GASTROENTEROLOGY | Facility: CLINIC | Age: 66
End: 2021-02-22

## 2021-02-22 ENCOUNTER — OFFICE VISIT (OUTPATIENT)
Dept: GASTROENTEROLOGY | Facility: CLINIC | Age: 66
End: 2021-02-22

## 2021-02-22 VITALS — BODY MASS INDEX: 24.17 KG/M2 | WEIGHT: 154 LBS | HEIGHT: 67 IN | TEMPERATURE: 98 F

## 2021-02-22 DIAGNOSIS — K22.70 BARRETT'S ESOPHAGUS WITHOUT DYSPLASIA: ICD-10-CM

## 2021-02-22 DIAGNOSIS — R13.10 DYSPHAGIA, UNSPECIFIED TYPE: ICD-10-CM

## 2021-02-22 DIAGNOSIS — K21.9 GASTROESOPHAGEAL REFLUX DISEASE, UNSPECIFIED WHETHER ESOPHAGITIS PRESENT: Primary | ICD-10-CM

## 2021-02-22 PROCEDURE — 99213 OFFICE O/P EST LOW 20 MIN: CPT | Performed by: INTERNAL MEDICINE

## 2021-02-22 RX ORDER — TRAZODONE HYDROCHLORIDE 100 MG/1
TABLET ORAL
COMMUNITY
Start: 2021-02-16 | End: 2022-03-09 | Stop reason: DRUGHIGH

## 2021-02-22 RX ORDER — ONDANSETRON HYDROCHLORIDE 8 MG/1
8 TABLET, FILM COATED ORAL 3 TIMES DAILY PRN
COMMUNITY
Start: 2021-01-25

## 2021-02-22 RX ORDER — SODIUM CHLORIDE, SODIUM LACTATE, POTASSIUM CHLORIDE, CALCIUM CHLORIDE 600; 310; 30; 20 MG/100ML; MG/100ML; MG/100ML; MG/100ML
30 INJECTION, SOLUTION INTRAVENOUS CONTINUOUS
Status: CANCELLED | OUTPATIENT
Start: 2021-03-12

## 2021-02-22 NOTE — PROGRESS NOTES
Chief Complaint   Patient presents with   • Follow-up   • Heartburn   • Diverticulosis     follow up on recent C-scope    • Difficulty Swallowing     Questions on last EGD couple yrs ago. Due for repeat 9/6/2021.        Cathy Gutiérrez is a  65 y.o. female here for a follow up visit for GERD, dysphagia    HPI this 65-year-old white female patient of Dr. Deborah Rodgers returns in follow-up since her recent colonoscopic examination in February 2.  She had questions about diverticulosis and we discussed that as well as follow-up in 5 years given her personal history of polyps and family history of polyps.  She has had some recent issues with dysphagia which have been problem in the past and we discussed previous findings on endoscopy dating back to September 2019.  She has been seen by speech pathology on multiple occasions in the past.  I encouraged her to can consider repeat endoscopy at this time to make sure there is no eosinophilic esophagitis and to assess structure.  She may warrant more formal testing such as manometry but would defer that for the present.  She is amenable to this.    Past Medical History:   Diagnosis Date   • Arthritis    • Cancer (CMS/HCC)     throid   • Depression    • Disease of thyroid gland    • GERD (gastroesophageal reflux disease)    • Hypertension    • PONV (postoperative nausea and vomiting)    • Sleep apnea        Current Outpatient Medications   Medication Sig Dispense Refill   • amphetamine-dextroamphetamine (ADDERALL) 10 MG tablet Take 2 tablets by mouth Daily.  0   • estradiol (VIVELLE-DOT) 0.1 MG/24HR patch Place 1 patch on the skin as directed by provider 2 (Two) Times a Week. 24 patch 2   • irbesartan-hydrochlorothiazide (AVALIDE) 300-12.5 MG tablet Take 1 tablet by mouth Daily. 30 tablet 6   • lamoTRIgine ER (LaMICtal XR) 200 MG tablet sustained-release 24 hour Take 200 mg by mouth 2 (two) times a day. 180 tablet 3   • liothyronine (CYTOMEL) 5 MCG tablet Take 0.5 tablets by  mouth Every Morning. 45 tablet 3   • LORazepam (ATIVAN) 0.5 MG tablet Take 1 tablet by mouth Daily As Needed. 30 tablet 5   • pantoprazole (PROTONIX) 40 MG EC tablet TAKE 1 TABLET BY MOUTH ONCE DAILY 90 tablet 3   • SYNTHROID 125 MCG tablet Take 125 mcg by mouth Every Morning.  0   • zolpidem (AMBIEN) 10 MG tablet TK 1 T PO QHS PRF INSOMNIA     • ondansetron (ZOFRAN) 8 MG tablet Take 8 mg by mouth 3 (Three) Times a Day As Needed.     • traZODone (DESYREL) 100 MG tablet        No current facility-administered medications for this visit.        PRN Meds:.    Allergies   Allergen Reactions   • Latex Rash       Social History     Socioeconomic History   • Marital status:      Spouse name: Not on file   • Number of children: Not on file   • Years of education: Not on file   • Highest education level: Not on file   Tobacco Use   • Smoking status: Never Smoker   • Smokeless tobacco: Never Used   Substance and Sexual Activity   • Alcohol use: Yes     Comment: 5+ drinks/night   • Drug use: No   • Sexual activity: Yes     Partners: Male     Birth control/protection: Post-menopausal       Family History   Problem Relation Age of Onset   • Breast cancer Maternal Grandmother    • Breast cancer Paternal Grandmother    • Breast cancer Maternal Aunt    • Breast cancer Paternal Aunt    • Alcohol abuse Mother 43   • Heart disease Father    • Hyperlipidemia Father    • Parkinsonism Brother         bulbar supranuclear palsy       Review of Systems   Constitutional: Negative for activity change, appetite change, fatigue and unexpected weight change.   HENT: Negative for congestion, facial swelling, sore throat, trouble swallowing and voice change.    Eyes: Negative for photophobia and visual disturbance.   Respiratory: Negative for cough and choking.    Cardiovascular: Negative for chest pain.   Gastrointestinal: Negative for abdominal distention, abdominal pain, anal bleeding, blood in stool, constipation, diarrhea, nausea,  rectal pain and vomiting.        GERD  Dysphagia   Endocrine: Negative for polyphagia.   Musculoskeletal: Negative for arthralgias, gait problem and joint swelling.   Skin: Negative for color change, pallor and rash.   Allergic/Immunologic: Negative for food allergies.   Neurological: Negative for speech difficulty and headaches.   Hematological: Does not bruise/bleed easily.   Psychiatric/Behavioral: Negative for agitation, confusion and sleep disturbance.       Vitals:    02/22/21 1449   Temp: 98 °F (36.7 °C)       Physical Exam  Constitutional:       Appearance: She is well-developed.   HENT:      Head: Normocephalic.   Eyes:      Conjunctiva/sclera: Conjunctivae normal.   Neck:      Musculoskeletal: Normal range of motion.   Cardiovascular:      Rate and Rhythm: Normal rate and regular rhythm.   Pulmonary:      Breath sounds: Normal breath sounds.   Abdominal:      General: Bowel sounds are normal.      Palpations: Abdomen is soft.   Musculoskeletal: Normal range of motion.   Skin:     General: Skin is warm and dry.   Neurological:      Mental Status: She is alert and oriented to person, place, and time.   Psychiatric:         Behavior: Behavior normal.         ASSESSMENT  GERD: On PPI  Dysphagia: Previous work-up  History of polyps  Family history of polyps      PLAN  Schedule EGD  Continue PPI      ICD-10-CM ICD-9-CM   1. Gastroesophageal reflux disease, unspecified whether esophagitis present  K21.9 [phone]

## 2021-02-23 ENCOUNTER — TRANSCRIBE ORDERS (OUTPATIENT)
Dept: ADMINISTRATIVE | Facility: HOSPITAL | Age: 66
End: 2021-02-23

## 2021-02-23 DIAGNOSIS — Z01.818 OTHER SPECIFIED PRE-OPERATIVE EXAMINATION: Primary | ICD-10-CM

## 2021-02-25 ENCOUNTER — OFFICE VISIT (OUTPATIENT)
Dept: INTERNAL MEDICINE | Facility: CLINIC | Age: 66
End: 2021-02-25

## 2021-02-25 VITALS
TEMPERATURE: 97 F | BODY MASS INDEX: 24.17 KG/M2 | SYSTOLIC BLOOD PRESSURE: 120 MMHG | HEART RATE: 78 BPM | WEIGHT: 154 LBS | HEIGHT: 67 IN | DIASTOLIC BLOOD PRESSURE: 74 MMHG

## 2021-02-25 DIAGNOSIS — IMO0001 LUNG NODULE < 6CM ON CT: ICD-10-CM

## 2021-02-25 DIAGNOSIS — G47.33 OBSTRUCTIVE SLEEP APNEA, ADULT: ICD-10-CM

## 2021-02-25 DIAGNOSIS — F10.10 ALCOHOL ABUSE: ICD-10-CM

## 2021-02-25 DIAGNOSIS — F31.9 BIPOLAR 1 DISORDER (HCC): ICD-10-CM

## 2021-02-25 DIAGNOSIS — Z01.419 ENCOUNTER FOR GYNECOLOGICAL EXAMINATION: Primary | ICD-10-CM

## 2021-02-25 DIAGNOSIS — I10 ESSENTIAL HYPERTENSION: ICD-10-CM

## 2021-02-25 DIAGNOSIS — Z00.00 PHYSICAL EXAM, ANNUAL: ICD-10-CM

## 2021-02-25 PROBLEM — B35.1 ONYCHOMYCOSIS: Status: RESOLVED | Noted: 2018-05-07 | Resolved: 2021-02-25

## 2021-02-25 PROCEDURE — 99397 PER PM REEVAL EST PAT 65+ YR: CPT | Performed by: INTERNAL MEDICINE

## 2021-03-01 LAB
CONV .: NORMAL
CYTOLOGIST CVX/VAG CYTO: NORMAL
CYTOLOGY CVX/VAG DOC CYTO: NORMAL
CYTOLOGY CVX/VAG DOC THIN PREP: NORMAL
DX ICD CODE: NORMAL
HIV 1 & 2 AB SER-IMP: NORMAL
OTHER STN SPEC: NORMAL
STAT OF ADQ CVX/VAG CYTO-IMP: NORMAL

## 2021-03-09 ENCOUNTER — APPOINTMENT (OUTPATIENT)
Dept: BONE DENSITY | Facility: HOSPITAL | Age: 66
End: 2021-03-09

## 2021-03-10 ENCOUNTER — LAB (OUTPATIENT)
Dept: LAB | Facility: HOSPITAL | Age: 66
End: 2021-03-10

## 2021-03-10 DIAGNOSIS — Z01.818 OTHER SPECIFIED PRE-OPERATIVE EXAMINATION: ICD-10-CM

## 2021-03-10 PROCEDURE — C9803 HOPD COVID-19 SPEC COLLECT: HCPCS

## 2021-03-10 PROCEDURE — U0004 COV-19 TEST NON-CDC HGH THRU: HCPCS

## 2021-03-11 LAB — SARS-COV-2 RNA RESP QL NAA+PROBE: NOT DETECTED

## 2021-03-12 ENCOUNTER — ANESTHESIA EVENT (OUTPATIENT)
Dept: GASTROENTEROLOGY | Facility: HOSPITAL | Age: 66
End: 2021-03-12

## 2021-03-12 ENCOUNTER — ANESTHESIA (OUTPATIENT)
Dept: GASTROENTEROLOGY | Facility: HOSPITAL | Age: 66
End: 2021-03-12

## 2021-03-12 ENCOUNTER — HOSPITAL ENCOUNTER (OUTPATIENT)
Facility: HOSPITAL | Age: 66
Setting detail: HOSPITAL OUTPATIENT SURGERY
Discharge: HOME | End: 2021-03-12
Attending: INTERNAL MEDICINE | Admitting: INTERNAL MEDICINE

## 2021-03-12 VITALS
RESPIRATION RATE: 14 BRPM | WEIGHT: 149 LBS | HEIGHT: 67 IN | SYSTOLIC BLOOD PRESSURE: 158 MMHG | OXYGEN SATURATION: 100 % | HEART RATE: 72 BPM | BODY MASS INDEX: 23.39 KG/M2 | DIASTOLIC BLOOD PRESSURE: 76 MMHG

## 2021-03-12 DIAGNOSIS — R13.10 DYSPHAGIA, UNSPECIFIED TYPE: ICD-10-CM

## 2021-03-12 DIAGNOSIS — K21.9 GASTROESOPHAGEAL REFLUX DISEASE, UNSPECIFIED WHETHER ESOPHAGITIS PRESENT: ICD-10-CM

## 2021-03-12 DIAGNOSIS — K22.70 BARRETT'S ESOPHAGUS WITHOUT DYSPLASIA: ICD-10-CM

## 2021-03-12 PROCEDURE — 88305 TISSUE EXAM BY PATHOLOGIST: CPT | Performed by: INTERNAL MEDICINE

## 2021-03-12 PROCEDURE — 25010000002 PROPOFOL 10 MG/ML EMULSION: Performed by: ANESTHESIOLOGY

## 2021-03-12 PROCEDURE — S0260 H&P FOR SURGERY: HCPCS | Performed by: INTERNAL MEDICINE

## 2021-03-12 PROCEDURE — 43239 EGD BIOPSY SINGLE/MULTIPLE: CPT | Performed by: INTERNAL MEDICINE

## 2021-03-12 RX ORDER — LIDOCAINE HYDROCHLORIDE 20 MG/ML
INJECTION, SOLUTION INFILTRATION; PERINEURAL AS NEEDED
Status: DISCONTINUED | OUTPATIENT
Start: 2021-03-12 | End: 2021-03-12 | Stop reason: SURG

## 2021-03-12 RX ORDER — SODIUM CHLORIDE 0.9 % (FLUSH) 0.9 %
10 SYRINGE (ML) INJECTION AS NEEDED
Status: CANCELLED | OUTPATIENT
Start: 2021-03-12

## 2021-03-12 RX ORDER — PROPOFOL 10 MG/ML
VIAL (ML) INTRAVENOUS AS NEEDED
Status: DISCONTINUED | OUTPATIENT
Start: 2021-03-12 | End: 2021-03-12 | Stop reason: SURG

## 2021-03-12 RX ORDER — SODIUM CHLORIDE, SODIUM LACTATE, POTASSIUM CHLORIDE, CALCIUM CHLORIDE 600; 310; 30; 20 MG/100ML; MG/100ML; MG/100ML; MG/100ML
30 INJECTION, SOLUTION INTRAVENOUS CONTINUOUS
Status: DISCONTINUED | OUTPATIENT
Start: 2021-03-12 | End: 2021-03-12 | Stop reason: HOSPADM

## 2021-03-12 RX ORDER — PROPOFOL 10 MG/ML
VIAL (ML) INTRAVENOUS CONTINUOUS PRN
Status: DISCONTINUED | OUTPATIENT
Start: 2021-03-12 | End: 2021-03-12 | Stop reason: SURG

## 2021-03-12 RX ORDER — GLYCOPYRROLATE 0.2 MG/ML
INJECTION INTRAMUSCULAR; INTRAVENOUS AS NEEDED
Status: DISCONTINUED | OUTPATIENT
Start: 2021-03-12 | End: 2021-03-12 | Stop reason: SURG

## 2021-03-12 RX ADMIN — PROPOFOL 300 MCG/KG/MIN: 10 INJECTION, EMULSION INTRAVENOUS at 15:03

## 2021-03-12 RX ADMIN — SODIUM CHLORIDE, POTASSIUM CHLORIDE, SODIUM LACTATE AND CALCIUM CHLORIDE 30 ML/HR: 600; 310; 30; 20 INJECTION, SOLUTION INTRAVENOUS at 14:28

## 2021-03-12 RX ADMIN — GLYCOPYRROLATE 0.2 MG: 0.2 INJECTION INTRAMUSCULAR; INTRAVENOUS at 15:02

## 2021-03-12 RX ADMIN — PROPOFOL 100 MG: 10 INJECTION, EMULSION INTRAVENOUS at 15:03

## 2021-03-12 RX ADMIN — LIDOCAINE HYDROCHLORIDE 60 MG: 20 INJECTION, SOLUTION INFILTRATION; PERINEURAL at 15:03

## 2021-03-12 NOTE — H&P
[facility]  Pre Procedure History & Physical    Chief Complaint:   GERD, dysphagia    Subjective     HPI:   This 65-year-old female presents the endoscopy suite for upper endoscopic evaluation.  She has had issues with reflux as well as dysphagia.    Past Medical History:   Past Medical History:   Diagnosis Date   • Arthritis    • Cancer (CMS/HCC)     throid   • Depression    • Disease of thyroid gland    • GERD (gastroesophageal reflux disease)    • Hypertension    • PONV (postoperative nausea and vomiting)    • Sleep apnea        Past Surgical History:  Past Surgical History:   Procedure Laterality Date   • BREAST BIOPSY     • BUNIONECTOMY Bilateral    • COLONOSCOPY  approx 2014    normal per pt   • COLONOSCOPY N/A 2/2/2021    Procedure: COLONOSCOPY INTO CECUM;  Surgeon: Angelito Kelley MD;  Location: [facility] ENDOSCOPY;  Service: Gastroenterology;  Laterality: N/A;  PRE: PERSONAL H/O COLON POLYPS  POST: DIVERTICULOSIS, TOROUS COLON   • DILATATION AND CURETTAGE     • ENDOSCOPY N/A 9/6/2019    Procedure: ESOPHAGOGASTRODUODENOSCOPY with cold biopsies and balloon dilitation size 15, 16.5, 18;  Surgeon: Angelito Kelley MD;  Location: [facility] ENDOSCOPY;  Service: Gastroenterology   • EPIDURAL BLOCK     • HYSTERECTOMY     • THYROID SURGERY     • UPPER GASTROINTESTINAL ENDOSCOPY  approx 2014    pt doesn't recall        Family History:  Family History   Problem Relation Age of Onset   • Breast cancer Maternal Grandmother    • Breast cancer Paternal Grandmother    • Breast cancer Maternal Aunt    • Breast cancer Paternal Aunt    • Alcohol abuse Mother 43   • Heart disease Father    • Hyperlipidemia Father    • Parkinsonism Brother         bulbar supranuclear palsy       Social History:   reports that she has never smoked. She has never used smokeless tobacco. She reports current alcohol use. She reports that she does not use drugs.    Medications:   No medications prior to admission.        Allergies:  Latex    ROS:    Pertinent items are noted in HPI, all other systems reviewed and negative     Objective     There were no vitals taken for this visit.    Physical Exam   Constitutional: Pt is oriented to person, place, and time and well-developed, well-nourished, and in no distress.   Mouth/Throat: Oropharynx is clear and moist.   Neck: Normal range of motion.   Cardiovascular: Normal rate, regular rhythm and normal heart sounds.    Pulmonary/Chest: Effort normal and breath sounds normal.   Abdominal: Soft. Nontender  Skin: Skin is warm and dry.   Psychiatric: Mood, memory, affect and judgment normal.     Assessment/Plan     Diagnosis:  GERD  Dysphagia    Anticipated Surgical Procedure:  EGD    The risks, benefits, and alternatives of this procedure have been discussed with the patient or the responsible party- the patient understands and agrees to proceed.

## 2021-03-12 NOTE — ANESTHESIA PREPROCEDURE EVALUATION
Anesthesia Evaluation     Patient summary reviewed and Nursing notes reviewed   history of anesthetic complications: PONV  NPO Solid Status: > 8 hours  NPO Liquid Status: > 4 hours           Airway   Mallampati: II  Dental      Pulmonary - normal exam   (+) sleep apnea,   Cardiovascular - normal exam    (+) hypertension less than 2 medications,       Neuro/Psych  (+) psychiatric history Depression and Bipolar,     GI/Hepatic/Renal/Endo    (+)  GERD,      Musculoskeletal     Abdominal    Substance History      OB/GYN          Other   arthritis,    history of cancer remission                    Anesthesia Plan    ASA 2     MAC     intravenous induction     Anesthetic plan, all risks, benefits, and alternatives have been provided, discussed and informed consent has been obtained with: patient.

## 2021-03-13 NOTE — ANESTHESIA POSTPROCEDURE EVALUATION
"Patient: Cathy Gutiérrez    Procedure Summary     Date: 03/12/21 Room / Location:  [facility] /  [facility]    Anesthesia Start: 1455 Anesthesia Stop: 1514    Procedure: ESOPHAGOGASTRODUODENOSCOPY WITH COLD BIOPSIES (N/A Esophagus) Diagnosis:       Esophagitis      Gastritis      Duodenogastric bile reflux      Duodenitis      (Gastroesophageal reflux disease, unspecified whether esophagitis present [K21.9])      (Dysphagia, unspecified type [R13.10])      (Arellano's esophagus without dysplasia [K22.70])    Surgeons: Angelito Kelley MD Provider: Rod Jim MD    Anesthesia Type: MAC ASA Status: 2          Anesthesia Type: MAC    Vitals  Vitals Value Taken Time   /76 03/12/21 1532   Temp     Pulse 72 03/12/21 1532   Resp 14 03/12/21 1532   SpO2 100 % 03/12/21 1532           Post Anesthesia Care and Evaluation    Patient location during evaluation: bedside  Patient participation: complete - patient participated  Level of consciousness: awake and alert  Pain score: 1  Pain management: adequate  Airway patency: patent  Anesthetic complications: No anesthetic complications  PONV Status: none  Cardiovascular status: acceptable  Respiratory status: acceptable  Hydration status: acceptable    Comments: /76 (BP Location: Left arm)   Pulse 72   Resp 14   Ht 170.2 cm (67\")   Wt 67.6 kg (149 lb)   SpO2 100%   BMI 23.34 kg/m²       "

## 2021-03-15 LAB
LAB AP CASE REPORT: NORMAL
PATH REPORT.FINAL DX SPEC: NORMAL
PATH REPORT.GROSS SPEC: NORMAL

## 2021-03-19 ENCOUNTER — BULK ORDERING (OUTPATIENT)
Dept: CASE MANAGEMENT | Facility: OTHER | Age: 66
End: 2021-03-19

## 2021-03-19 DIAGNOSIS — Z23 IMMUNIZATION DUE: ICD-10-CM

## 2021-03-24 ENCOUNTER — HOSPITAL ENCOUNTER (OUTPATIENT)
Dept: BONE DENSITY | Facility: HOSPITAL | Age: 66
Discharge: HOME | End: 2021-03-24
Admitting: INTERNAL MEDICINE

## 2021-03-24 PROCEDURE — 77080 DXA BONE DENSITY AXIAL: CPT

## 2021-04-05 ENCOUNTER — TELEPHONE (OUTPATIENT)
Dept: GASTROENTEROLOGY | Facility: CLINIC | Age: 66
End: 2021-04-05

## 2021-04-05 NOTE — TELEPHONE ENCOUNTER
----- Message from Angelito ZEPEDA MD sent at 3/24/2021  5:59 PM EDT -----  Regarding: Biopsy results  Okay to call results, current biopsies show no evidence of Arellano's.  Would consider follow-up EGD in 3 years and continued use of PPI.  Patient can call if she has further questions.  ----- Message -----  From: Lab, Background User  Sent: 3/15/2021   2:18 PM EDT  To: Angelito ZEPEDA MD

## 2021-04-13 NOTE — TELEPHONE ENCOUNTER
Called pt and per path report advised duodenum bx was benign. The stomach bx showed mild chronic inactive gastritis and was neg for h pylori. The ge junction bx did not show mckeon's .  Advised of Dr Kelley's recommendations.  Pt verb understanding.

## 2021-06-10 ENCOUNTER — HOSPITAL ENCOUNTER (OUTPATIENT)
Dept: CT IMAGING | Facility: HOSPITAL | Age: 66
Discharge: HOME | End: 2021-06-10
Admitting: INTERNAL MEDICINE

## 2021-06-10 DIAGNOSIS — R91.8 LUNG NODULES: ICD-10-CM

## 2021-06-10 PROCEDURE — 71250 CT THORAX DX C-: CPT

## 2021-08-15 RX ORDER — ESTRADIOL 0.1 MG/D
1 FILM, EXTENDED RELEASE TRANSDERMAL 2 TIMES WEEKLY
Qty: 24 PATCH | Refills: 2 | Status: CANCELLED | OUTPATIENT
Start: 2021-08-16

## 2021-08-16 RX ORDER — ESTRADIOL 0.1 MG/D
1 FILM, EXTENDED RELEASE TRANSDERMAL 2 TIMES WEEKLY
Qty: 24 PATCH | Refills: 2 | Status: SHIPPED | OUTPATIENT
Start: 2021-08-16 | End: 2022-05-12 | Stop reason: SDUPTHER

## 2021-08-25 ENCOUNTER — TRANSCRIBE ORDERS (OUTPATIENT)
Dept: ADMINISTRATIVE | Facility: HOSPITAL | Age: 66
End: 2021-08-25

## 2021-08-25 DIAGNOSIS — C73 PAPILLARY CARCINOMA OF THYROID (HCC): Primary | ICD-10-CM

## 2021-08-25 RX ORDER — PANTOPRAZOLE SODIUM 40 MG/1
40 TABLET, DELAYED RELEASE ORAL DAILY
Qty: 30 TABLET | Refills: 4 | Status: CANCELLED | OUTPATIENT
Start: 2021-08-25

## 2021-08-25 RX ORDER — PANTOPRAZOLE SODIUM 40 MG/1
40 TABLET, DELAYED RELEASE ORAL DAILY
Qty: 30 TABLET | Refills: 6 | Status: SHIPPED | OUTPATIENT
Start: 2021-08-25 | End: 2022-02-22 | Stop reason: SDUPTHER

## 2021-08-25 RX ORDER — IRBESARTAN AND HYDROCHLOROTHIAZIDE 300; 12.5 MG/1; MG/1
1 TABLET, FILM COATED ORAL DAILY
Qty: 30 TABLET | Refills: 6 | Status: SHIPPED | OUTPATIENT
Start: 2021-08-25 | End: 2022-02-15 | Stop reason: SDUPTHER

## 2021-08-25 NOTE — TELEPHONE ENCOUNTER
Caller: Cathy Gutiérrez    Relationship: Self    Best call back number:[phone]     Medication needed:   Requested Prescriptions     Pending Prescriptions Disp Refills   • irbesartan-hydrochlorothiazide (AVALIDE) 300-12.5 MG tablet 30 tablet 6     Sig: Take 1 tablet by mouth Daily.       When do you need the refill by: ASAP     What additional details did the patient provide when requesting the medication:PATIENT WAS GETTING FROM OLD PROVIDER     Does the patient have less than a 3 day supply:  [x] Yes  [] No    What is the patient's preferred pharmacy: [facility]

## 2021-12-15 ENCOUNTER — TELEPHONE (OUTPATIENT)
Dept: INTERNAL MEDICINE | Facility: CLINIC | Age: 66
End: 2021-12-15

## 2021-12-15 NOTE — TELEPHONE ENCOUNTER
PATIENT CALLING WANTING TO KNOW IF SHE IS UP TO DATE ON HER TETANUS VACCINE     PLEASE ADVISE  [phone]

## 2022-01-10 ENCOUNTER — HOSPITAL ENCOUNTER (OUTPATIENT)
Dept: GENERAL RADIOLOGY | Facility: HOSPITAL | Age: 67
Discharge: HOME | End: 2022-01-10
Admitting: OBSTETRICS & GYNECOLOGY

## 2022-01-10 DIAGNOSIS — K59.01 SLOW TRANSIT CONSTIPATION: ICD-10-CM

## 2022-01-10 PROCEDURE — 74018 RADEX ABDOMEN 1 VIEW: CPT

## 2022-01-15 ENCOUNTER — HOSPITAL ENCOUNTER (OUTPATIENT)
Dept: GENERAL RADIOLOGY | Facility: HOSPITAL | Age: 67
Discharge: HOME | End: 2022-01-15
Admitting: OBSTETRICS & GYNECOLOGY

## 2022-01-15 DIAGNOSIS — K59.01 SLOW TRANSIT CONSTIPATION: ICD-10-CM

## 2022-01-15 PROCEDURE — 74018 RADEX ABDOMEN 1 VIEW: CPT

## 2022-02-02 ENCOUNTER — APPOINTMENT (OUTPATIENT)
Dept: ULTRASOUND IMAGING | Facility: HOSPITAL | Age: 67
End: 2022-02-02

## 2022-02-15 RX ORDER — IRBESARTAN AND HYDROCHLOROTHIAZIDE 300; 12.5 MG/1; MG/1
1 TABLET, FILM COATED ORAL DAILY
Qty: 30 TABLET | Refills: 6 | OUTPATIENT
Start: 2022-02-15 | End: 2022-07-09

## 2022-02-22 ENCOUNTER — TELEPHONE (OUTPATIENT)
Dept: GASTROENTEROLOGY | Facility: CLINIC | Age: 67
End: 2022-02-22

## 2022-02-22 RX ORDER — PANTOPRAZOLE SODIUM 40 MG/1
40 TABLET, DELAYED RELEASE ORAL DAILY
Qty: 90 TABLET | Refills: 0 | Status: SHIPPED | OUTPATIENT
Start: 2022-02-22

## 2022-02-22 NOTE — TELEPHONE ENCOUNTER
REcieved fax from [facility] requesting refill on pantoprazole 40mg po daily #90 with no refills.  Script escribed to [facility] with note advising for further refills' pt needs to make appt.

## 2022-03-02 DIAGNOSIS — Z00.00 PHYSICAL EXAM, ANNUAL: Primary | ICD-10-CM

## 2022-03-03 ENCOUNTER — HOSPITAL ENCOUNTER (OUTPATIENT)
Dept: ULTRASOUND IMAGING | Facility: HOSPITAL | Age: 67
Discharge: HOME | End: 2022-03-03
Admitting: INTERNAL MEDICINE

## 2022-03-03 DIAGNOSIS — C73 PAPILLARY CARCINOMA OF THYROID: ICD-10-CM

## 2022-03-03 PROCEDURE — 76536 US EXAM OF HEAD AND NECK: CPT

## 2022-03-04 LAB
ALBUMIN SERPL-MCNC: 4.4 G/DL (ref 3.8–4.8)
ALBUMIN/GLOB SERPL: 1.8 {RATIO} (ref 1.2–2.2)
ALP SERPL-CCNC: 64 IU/L (ref 44–121)
ALT SERPL-CCNC: 17 IU/L (ref 0–32)
AST SERPL-CCNC: 21 IU/L (ref 0–40)
BASOPHILS # BLD AUTO: 0.1 X10E3/UL (ref 0–0.2)
BASOPHILS NFR BLD AUTO: 1 %
BILIRUB SERPL-MCNC: 0.5 MG/DL (ref 0–1.2)
BUN SERPL-MCNC: 15 MG/DL (ref 8–27)
BUN/CREAT SERPL: 16 (ref 12–28)
CALCIUM SERPL-MCNC: 9.2 MG/DL (ref 8.7–10.3)
CHLORIDE SERPL-SCNC: 104 MMOL/L (ref 96–106)
CHOLEST SERPL-MCNC: 252 MG/DL (ref 100–199)
CO2 SERPL-SCNC: 22 MMOL/L (ref 20–29)
CREAT SERPL-MCNC: 0.92 MG/DL (ref 0.57–1)
EGFR GENE MUT ANL BLD/T: 69 ML/MIN/1.73
EOSINOPHIL # BLD AUTO: 0.3 X10E3/UL (ref 0–0.4)
EOSINOPHIL NFR BLD AUTO: 4 %
ERYTHROCYTE [DISTWIDTH] IN BLOOD BY AUTOMATED COUNT: 13.1 % (ref 11.7–15.4)
GLOBULIN SER CALC-MCNC: 2.4 G/DL (ref 1.5–4.5)
GLUCOSE SERPL-MCNC: 105 MG/DL (ref 65–99)
HCT VFR BLD AUTO: 44.7 % (ref 34–46.6)
HDLC SERPL-MCNC: 77 MG/DL
HGB BLD-MCNC: 14.8 G/DL (ref 11.1–15.9)
IMM GRANULOCYTES # BLD AUTO: 0 X10E3/UL (ref 0–0.1)
IMM GRANULOCYTES NFR BLD AUTO: 0 %
LDLC SERPL CALC-MCNC: 149 MG/DL (ref 0–99)
LYMPHOCYTES # BLD AUTO: 1.2 X10E3/UL (ref 0.7–3.1)
LYMPHOCYTES NFR BLD AUTO: 20 %
MCH RBC QN AUTO: 30.5 PG (ref 26.6–33)
MCHC RBC AUTO-ENTMCNC: 33.1 G/DL (ref 31.5–35.7)
MCV RBC AUTO: 92 FL (ref 79–97)
MONOCYTES # BLD AUTO: 0.6 X10E3/UL (ref 0.1–0.9)
MONOCYTES NFR BLD AUTO: 9 %
NEUTROPHILS # BLD AUTO: 4 X10E3/UL (ref 1.4–7)
NEUTROPHILS NFR BLD AUTO: 66 %
PLATELET # BLD AUTO: 275 X10E3/UL (ref 150–450)
POTASSIUM SERPL-SCNC: 4 MMOL/L (ref 3.5–5.2)
PROT SERPL-MCNC: 6.8 G/DL (ref 6–8.5)
RBC # BLD AUTO: 4.86 X10E6/UL (ref 3.77–5.28)
SODIUM SERPL-SCNC: 141 MMOL/L (ref 134–144)
TRIGL SERPL-MCNC: 149 MG/DL (ref 0–149)
VLDLC SERPL CALC-MCNC: 26 MG/DL (ref 5–40)
WBC # BLD AUTO: 6.1 X10E3/UL (ref 3.4–10.8)

## 2022-03-09 ENCOUNTER — TELEPHONE (OUTPATIENT)
Dept: INTERNAL MEDICINE | Facility: CLINIC | Age: 67
End: 2022-03-09

## 2022-03-09 ENCOUNTER — OFFICE VISIT (OUTPATIENT)
Dept: INTERNAL MEDICINE | Facility: CLINIC | Age: 67
End: 2022-03-09

## 2022-03-09 VITALS
HEIGHT: 67 IN | SYSTOLIC BLOOD PRESSURE: 134 MMHG | HEART RATE: 76 BPM | TEMPERATURE: 97.3 F | WEIGHT: 149 LBS | DIASTOLIC BLOOD PRESSURE: 72 MMHG | BODY MASS INDEX: 23.39 KG/M2

## 2022-03-09 DIAGNOSIS — C73 PAPILLARY THYROID CARCINOMA: ICD-10-CM

## 2022-03-09 DIAGNOSIS — I10 ESSENTIAL HYPERTENSION: Primary | ICD-10-CM

## 2022-03-09 DIAGNOSIS — F10.10 ALCOHOL ABUSE: ICD-10-CM

## 2022-03-09 DIAGNOSIS — Z12.31 VISIT FOR SCREENING MAMMOGRAM: ICD-10-CM

## 2022-03-09 DIAGNOSIS — Z00.00 INITIAL MEDICARE ANNUAL WELLNESS VISIT: ICD-10-CM

## 2022-03-09 PROCEDURE — 1159F MED LIST DOCD IN RCRD: CPT | Performed by: INTERNAL MEDICINE

## 2022-03-09 PROCEDURE — G0402 INITIAL PREVENTIVE EXAM: HCPCS | Performed by: INTERNAL MEDICINE

## 2022-03-09 RX ORDER — VIBEGRON 75 MG/1
TABLET, FILM COATED ORAL
COMMUNITY
End: 2022-07-28

## 2022-03-09 NOTE — TELEPHONE ENCOUNTER
What?  Wasn't she just here?  She didn't say anything.  Needs to take OTC cold remedies, it can't be that bad if she didn't mention it.

## 2022-03-09 NOTE — TELEPHONE ENCOUNTER
Caller: Cathy Gutiérrez    Relationship: Self    Best call back number: [phone]    What medication are you requesting: SOMETHING FOR SORE THROAT AND COUGH     What are your current symptoms: SORE THROAT AND COUGH AND CONGESTION     How long have you been experiencing symptoms: 1 WEEK     Have you had these symptoms before:    [] Yes  [x] No    Have you been treated for these symptoms before:   [] Yes  [x] No    If a prescription is needed, what is your preferred pharmacy and phone number: [address] [phone] [facility] [phone] FX

## 2022-03-09 NOTE — PROGRESS NOTES
The ABCs of the Annual Wellness Visit  Subsequent Medicare Wellness Visit    Chief Complaint   Patient presents with   • Medicare Wellness-subsequent      Subjective    History of Present Illness:  Cathy Gutiérrez is a 66 y.o. female who presents for a Subsequent Medicare Wellness Visit.  Under a lot of stress with husbands senior living and cancer dx, son in alcohol rehab.  She is starting to do some therapy and Al-anon.  She also drinks a lot- more than 5 drinks daily.  Her daughter is in recovery. She sees Dr. Jones at [facility]- he knows she has drinking triggers but doesn't recommend yet that she go to rehab.  She also takes Ativan prn,  heavy drinker.   Having surgery with Dr. Jeanette Lawson for bladder issues- to have a sling redone.    She is having trouble with the hormone patch- it won't stay on.  She is also using Imvexxy per Dr. Lawson.      The following portions of the patient's history were reviewed and   updated as appropriate: allergies, current medications, past family history, past medical history, past social history, past surgical history and problem list.    Compared to one year ago, the patient feels her physical   health is worse.    Compared to one year ago, the patient feels her mental   health is worse.    Recent Hospitalizations:  She was not admitted to the hospital during the last year.       Current Medical Providers:  Patient Care Team:  Deborah Rodgers MD as PCP - General (Internal Medicine)  Royer Jones MD (Psychiatry)  Myrtle Anthony MD as Consulting Physician (Endocrinology)    Outpatient Medications Prior to Visit   Medication Sig Dispense Refill   • amphetamine-dextroamphetamine (ADDERALL) 10 MG tablet Take 1 tablet by mouth Daily. 30 tablet 0   • Estradiol (IMVEXXY VA) Insert  into the vagina.     • estradiol (VIVELLE-DOT) 0.1 MG/24HR patch Place 1 patch on the skin as directed by provider 2 (Two) Times a Week. 24 patch 2   • hydrOXYzine (ATARAX) 25 MG tablet Take 1 tablet  by mouth every night at bedtime. 15 tablet 1   • irbesartan-hydrochlorothiazide (AVALIDE) 300-12.5 MG tablet Take 1 tablet by mouth Daily. 30 tablet 6   • lamoTRIgine ER (LaMICtal XR) 200 MG tablet sustained-release 24 hour Take 1 tablet by mouth 2 (two) times a day. 180 tablet 3   • levothyroxine (SYNTHROID, LEVOTHROID) 112 MCG tablet Take 1 tablet by mouth Every Morning Before Breakfast. 90 tablet 3   • liothyronine (CYTOMEL) 5 MCG tablet Take 0.5 tablets by mouth Daily. 45 tablet 3   • LORazepam (ATIVAN) 0.5 MG tablet Take 1 tablet by mouth Daily As Needed for anxiety 30 tablet 5   • ondansetron (ZOFRAN) 8 MG tablet Take 8 mg by mouth 3 (Three) Times a Day As Needed.     • pantoprazole (PROTONIX) 40 MG EC tablet Take 1 tablet by mouth Daily. For further refill, pt needs to make appt. 90 tablet 0   • Vibegron (Gemtesa) 75 MG tablet Take  by mouth.     • zolpidem (AMBIEN) 10 MG tablet Take 1 tablet by mouth At Night As Needed. 30 tablet 5   • doxycycline (MONODOX) 100 MG capsule Take 1 capsule by mouth 2 (Two) Times a Day. 20 capsule 0   • solifenacin (VESIcare) 5 MG tablet Take 1 tablet by mouth Daily. 30 tablet 0   • zolpidem (AMBIEN) 10 MG tablet TK 1 T PO QHS PRF INSOMNIA     • amphetamine-dextroamphetamine (ADDERALL) 10 MG tablet Take 2 tablets by mouth Daily.  0   • linaclotide (Linzess) 72 MCG capsule capsule Take 1 capsule by mouth Every Morning Before Breakfast. 30 capsule 6   • ondansetron (ZOFRAN) 8 MG tablet take 1 tablet by mouth three times per day as needed 20 tablet 0   • traZODone (DESYREL) 100 MG tablet      • Vibegron (Gemtesa) 75 MG tablet Take 1 tablet by mouth Daily. 90 tablet 0   • zolpidem (AMBIEN) 10 MG tablet take 1 tablet by mouth at bedtime as needed 30 tablet 5     No facility-administered medications prior to visit.       No opioid medication identified on active medication list. I have reviewed chart for other potential  high risk medication/s and harmful drug interactions in the  "elderly.          Aspirin is not on active medication list.  Aspirin use is not indicated based on review of current medical condition/s. Risk of harm outweighs potential benefits.  .    Patient Active Problem List   Diagnosis   • Vitamin D deficiency   • Obstructive sleep apnea, adult   • Gastroesophageal reflux disease   • Lung nodule < 6cm on CT   • Papillary thyroid carcinoma (HCC)   • Essential hypertension   • Bipolar 1 disorder (HCC)   • Polyp of colon   • Arellano's esophagus without dysplasia     Advance Care Planning  Advance Directive is not on file.  ACP discussion was held with the patient during this visit. Patient has an advance directive (not in EMR), copy requested.    Review of Systems   HENT: Negative.    Eyes: Negative for visual disturbance.   Respiratory: Negative for cough and shortness of breath.    Cardiovascular: Negative for chest pain and leg swelling.   Gastrointestinal: Negative for abdominal pain.   Genitourinary: Positive for urgency.   Musculoskeletal: Negative for arthralgias and back pain.   Neurological: Negative for dizziness and confusion.        Objective    Vitals:    03/09/22 1334   BP: 134/72   Pulse: 76   Temp: 97.3 °F (36.3 °C)   Weight: 67.6 kg (149 lb)   Height: 170.2 cm (67\")     BMI Readings from Last 1 Encounters:   03/09/22 23.34 kg/m²   BMI is within normal parameters. No follow-up required.    Does the patient have evidence of cognitive impairment? No    Physical Exam  Constitutional:       Appearance: Normal appearance.   Cardiovascular:      Rate and Rhythm: Normal rate and regular rhythm.   Pulmonary:      Effort: Pulmonary effort is normal.      Breath sounds: Normal breath sounds.   Chest:   Breasts:      Right: Normal.      Left: Normal.       Abdominal:      General: Bowel sounds are normal.   Musculoskeletal:         General: Normal range of motion.      Right lower leg: No edema.      Left lower leg: No edema.   Lymphadenopathy:      Cervical: No cervical " adenopathy.   Skin:     General: Skin is warm and dry.   Neurological:      General: No focal deficit present.   Psychiatric:         Mood and Affect: Mood normal.         Behavior: Behavior normal.         Thought Content: Thought content normal.         Judgment: Judgment normal.       Lab Results   Component Value Date    CHLPL 252 (H) 03/03/2022    TRIG 149 03/03/2022    HDL 77 03/03/2022     (H) 03/03/2022    VLDL 26 03/03/2022            HEALTH RISK ASSESSMENT    Smoking Status:  Social History     Tobacco Use   Smoking Status Never Smoker   Smokeless Tobacco Never Used     Alcohol Consumption:  Social History     Substance and Sexual Activity   Alcohol Use Yes    Comment: 5+ drinks/night     [facility] Screen:    [facility]    Depression Screening:  PHQ-2/PHQ-9 Depression Screening 3/9/2022   Little Interest or Pleasure in Doing Things 0-->not at all   Feeling Down, Depressed or Hopeless 0-->not at all   PHQ-9: Brief Depression Severity Measure Score 0       Health Habits and Functional and Cognitive Screening:  Functional & Cognitive Status 3/9/2022   Do you have difficulty preparing food and eating? No   Do you have difficulty bathing yourself, getting dressed or grooming yourself? No   Do you have difficulty using the toilet? No   Do you have difficulty moving around from place to place? No   Do you have trouble with steps or getting out of a bed or a chair? No   Current Diet Well Balanced Diet   Dental Exam Up to date   Eye Exam Up to date   Exercise (times per week) 0 times per week   Current Exercises Include No Regular Exercise   Do you need help using the phone?  No   Are you deaf or do you have serious difficulty hearing?  No   Do you need help with transportation? No   Do you need help shopping? No   Do you need help preparing meals?  No   Do you need help with housework?  No   Do you need help with laundry? No   Do you need help taking your  medications? No   Do you need help managing money? No   Have you felt unusual stress, anger or loneliness in the last month? No   Who do you live with? Child   If you need help, do you have trouble finding someone available to you? No   Have you been bothered in the last four weeks by sexual problems? No   Do you have difficulty concentrating, remembering or making decisions? No       Age-appropriate Screening Schedule:  Refer to the list below for future screening recommendations based on patient's age, sex and/or medical conditions. Orders for these recommended tests are listed in the plan section. The patient has been provided with a written plan.    Health Maintenance   Topic Date Due   • MAMMOGRAM  08/13/2022   • DXA SCAN  03/24/2023   • PAP SMEAR  02/25/2024   • TDAP/TD VACCINES (2 - Td or Tdap) 12/16/2031   • INFLUENZA VACCINE  Completed   • ZOSTER VACCINE  Completed              Assessment/Plan   CMS Preventative Services Quick Reference  Risk Factors Identified During Encounter  Alcohol Misuse  Immunizations Discussed/Encouraged (specific Immunizations; Pneumococcal 23  The above risks/problems have been discussed with the patient.  Follow up actions/plans if indicated are seen below in the Assessment/Plan Section.  Pertinent information has been shared with the patient in the After Visit Summary.    Diagnoses and all orders for this visit:    1. Essential hypertension (Primary)  Comments:  Controlled, no change.     2. Papillary thyroid carcinoma (HCC)  Comments:  follows with Dr. Anthony    3. Alcohol abuse  Comments:  discussed at length- doubtful she will cut back or quit although I encouraged participation in Al-anon    4. Visit for screening mammogram  -     Mammo Screening Bilateral With CAD    5. Initial Medicare annual wellness visit  Comments:  Pneumovax given- Reviewed healthy habits.  Will follow closely.        Follow Up:   Return in about 1 year (around 3/9/2023) for Medicare Wellness.     An  After Visit Summary and PPPS were made available to the patient.

## 2022-03-10 ENCOUNTER — TELEPHONE (OUTPATIENT)
Dept: INTERNAL MEDICINE | Facility: CLINIC | Age: 67
End: 2022-03-10

## 2022-03-10 NOTE — TELEPHONE ENCOUNTER
Called spoke with PT about sore throat, congestion, cough she said that it's been going on since last Wednesday been treating with OTC robitussin, using nedipod     She then went on to tell me her granddaughter who is 2 months went to hospital er last week and was Diagnoised with corona virus OC43??

## 2022-03-10 NOTE — TELEPHONE ENCOUNTER
Hub staff attempted to follow warm transfer process and was unsuccessful     Caller: Cathy Gutiérrez    Relationship to patient: Self    Best call back number: [phone] (H)    Patient is needing: RETURNED MISSED CALL.        THANKS

## 2022-03-18 ENCOUNTER — TRANSCRIBE ORDERS (OUTPATIENT)
Dept: ADMINISTRATIVE | Facility: HOSPITAL | Age: 67
End: 2022-03-18

## 2022-03-18 DIAGNOSIS — Z12.39 SCREENING BREAST EXAMINATION: Primary | ICD-10-CM

## 2022-03-30 ENCOUNTER — HOSPITAL ENCOUNTER (OUTPATIENT)
Dept: MAMMOGRAPHY | Facility: HOSPITAL | Age: 67
Discharge: HOME | End: 2022-03-30
Admitting: INTERNAL MEDICINE

## 2022-03-30 DIAGNOSIS — Z12.39 SCREENING BREAST EXAMINATION: ICD-10-CM

## 2022-03-30 PROCEDURE — 77067 SCR MAMMO BI INCL CAD: CPT

## 2022-03-30 PROCEDURE — 77063 BREAST TOMOSYNTHESIS BI: CPT

## 2022-05-12 RX ORDER — ESTRADIOL 0.1 MG/D
1 FILM, EXTENDED RELEASE TRANSDERMAL 2 TIMES WEEKLY
Qty: 24 PATCH | Refills: 2 | Status: SHIPPED | OUTPATIENT
Start: 2022-05-12

## 2022-06-29 RX ORDER — PANTOPRAZOLE SODIUM 40 MG/1
40 TABLET, DELAYED RELEASE ORAL DAILY
Qty: 90 TABLET | Refills: 0 | OUTPATIENT
Start: 2022-06-29

## 2022-07-09 ENCOUNTER — HOSPITAL ENCOUNTER (EMERGENCY)
Facility: HOSPITAL | Age: 67
Discharge: HOME | End: 2022-07-09
Attending: EMERGENCY MEDICINE | Admitting: EMERGENCY MEDICINE

## 2022-07-09 ENCOUNTER — APPOINTMENT (OUTPATIENT)
Dept: GENERAL RADIOLOGY | Facility: HOSPITAL | Age: 67
End: 2022-07-09

## 2022-07-09 VITALS
SYSTOLIC BLOOD PRESSURE: 161 MMHG | OXYGEN SATURATION: 97 % | RESPIRATION RATE: 16 BRPM | TEMPERATURE: 98.7 F | DIASTOLIC BLOOD PRESSURE: 79 MMHG | HEART RATE: 59 BPM

## 2022-07-09 DIAGNOSIS — R06.02 SHORTNESS OF BREATH: ICD-10-CM

## 2022-07-09 DIAGNOSIS — I10 PRIMARY HYPERTENSION: Primary | ICD-10-CM

## 2022-07-09 LAB
ALBUMIN SERPL-MCNC: 4.3 G/DL (ref 3.5–5.2)
ALBUMIN/GLOB SERPL: 2.2 G/DL
ALP SERPL-CCNC: 63 U/L (ref 39–117)
ALT SERPL W P-5'-P-CCNC: 25 U/L (ref 1–33)
ANION GAP SERPL CALCULATED.3IONS-SCNC: 9.6 MMOL/L (ref 5–15)
AST SERPL-CCNC: 20 U/L (ref 1–32)
BASOPHILS # BLD AUTO: 0.06 10*3/MM3 (ref 0–0.2)
BASOPHILS NFR BLD AUTO: 0.8 % (ref 0–1.5)
BILIRUB SERPL-MCNC: 0.3 MG/DL (ref 0–1.2)
BUN SERPL-MCNC: 14 MG/DL (ref 8–23)
BUN/CREAT SERPL: 17.9 (ref 7–25)
CALCIUM SPEC-SCNC: 9.4 MG/DL (ref 8.6–10.5)
CHLORIDE SERPL-SCNC: 101 MMOL/L (ref 98–107)
CO2 SERPL-SCNC: 28.4 MMOL/L (ref 22–29)
CREAT SERPL-MCNC: 0.78 MG/DL (ref 0.57–1)
DEPRECATED RDW RBC AUTO: 41.5 FL (ref 37–54)
EGFRCR SERPLBLD CKD-EPI 2021: 83.4 ML/MIN/1.73
EOSINOPHIL # BLD AUTO: 0.27 10*3/MM3 (ref 0–0.4)
EOSINOPHIL NFR BLD AUTO: 3.6 % (ref 0.3–6.2)
ERYTHROCYTE [DISTWIDTH] IN BLOOD BY AUTOMATED COUNT: 12.8 % (ref 12.3–15.4)
GLOBULIN UR ELPH-MCNC: 2 GM/DL
GLUCOSE SERPL-MCNC: 97 MG/DL (ref 65–99)
HCT VFR BLD AUTO: 42 % (ref 34–46.6)
HGB BLD-MCNC: 15 G/DL (ref 12–15.9)
HOLD SPECIMEN: NORMAL
HOLD SPECIMEN: NORMAL
IMM GRANULOCYTES # BLD AUTO: 0.02 10*3/MM3 (ref 0–0.05)
IMM GRANULOCYTES NFR BLD AUTO: 0.3 % (ref 0–0.5)
LYMPHOCYTES # BLD AUTO: 1.34 10*3/MM3 (ref 0.7–3.1)
LYMPHOCYTES NFR BLD AUTO: 17.7 % (ref 19.6–45.3)
MCH RBC QN AUTO: 32.1 PG (ref 26.6–33)
MCHC RBC AUTO-ENTMCNC: 35.7 G/DL (ref 31.5–35.7)
MCV RBC AUTO: 89.9 FL (ref 79–97)
MONOCYTES # BLD AUTO: 0.61 10*3/MM3 (ref 0.1–0.9)
MONOCYTES NFR BLD AUTO: 8.1 % (ref 5–12)
NEUTROPHILS NFR BLD AUTO: 5.27 10*3/MM3 (ref 1.7–7)
NEUTROPHILS NFR BLD AUTO: 69.5 % (ref 42.7–76)
NRBC BLD AUTO-RTO: 0 /100 WBC (ref 0–0.2)
PLATELET # BLD AUTO: 307 10*3/MM3 (ref 140–450)
PMV BLD AUTO: 11.5 FL (ref 6–12)
POTASSIUM SERPL-SCNC: 3.8 MMOL/L (ref 3.5–5.2)
PROT SERPL-MCNC: 6.3 G/DL (ref 6–8.5)
RBC # BLD AUTO: 4.67 10*6/MM3 (ref 3.77–5.28)
SODIUM SERPL-SCNC: 139 MMOL/L (ref 136–145)
TROPONIN T SERPL-MCNC: <0.01 NG/ML (ref 0–0.03)
WBC NRBC COR # BLD: 7.57 10*3/MM3 (ref 3.4–10.8)
WHOLE BLOOD HOLD COAG: NORMAL
WHOLE BLOOD HOLD SPECIMEN: NORMAL

## 2022-07-09 PROCEDURE — 84484 ASSAY OF TROPONIN QUANT: CPT | Performed by: EMERGENCY MEDICINE

## 2022-07-09 PROCEDURE — 93005 ELECTROCARDIOGRAM TRACING: CPT | Performed by: EMERGENCY MEDICINE

## 2022-07-09 PROCEDURE — 93010 ELECTROCARDIOGRAM REPORT: CPT | Performed by: INTERNAL MEDICINE

## 2022-07-09 PROCEDURE — 85025 COMPLETE CBC W/AUTO DIFF WBC: CPT | Performed by: EMERGENCY MEDICINE

## 2022-07-09 PROCEDURE — 80053 COMPREHEN METABOLIC PANEL: CPT | Performed by: EMERGENCY MEDICINE

## 2022-07-09 PROCEDURE — 71046 X-RAY EXAM CHEST 2 VIEWS: CPT

## 2022-07-09 PROCEDURE — 99283 EMERGENCY DEPT VISIT LOW MDM: CPT

## 2022-07-09 RX ORDER — IRBESARTAN AND HYDROCHLOROTHIAZIDE 150; 12.5 MG/1; MG/1
2 TABLET, FILM COATED ORAL DAILY
Qty: 60 TABLET | Refills: 0 | Status: SHIPPED | OUTPATIENT
Start: 2022-07-09 | End: 2022-08-15

## 2022-07-09 RX ORDER — ASPIRIN 325 MG
325 TABLET ORAL ONCE
Status: DISCONTINUED | OUTPATIENT
Start: 2022-07-09 | End: 2022-07-09 | Stop reason: HOSPADM

## 2022-07-09 RX ORDER — SODIUM CHLORIDE 0.9 % (FLUSH) 0.9 %
10 SYRINGE (ML) INJECTION AS NEEDED
Status: DISCONTINUED | OUTPATIENT
Start: 2022-07-09 | End: 2022-07-09 | Stop reason: HOSPADM

## 2022-07-09 NOTE — ED PROVIDER NOTES
EMERGENCY DEPARTMENT ENCOUNTER    Room Number:  39/39  Date of encounter:  7/9/2022  PCP: Deborah Rodgers MD  Historian: patient,       HPI:  Chief Complaint: High blood pressure  A complete HPI/ROS/PMH/PSH/SH/FH are unobtainable due to: None    Context: Cathy Gutiérrez is a 67 y.o. female who presents to the ED c/o high blood pressure.  She states that she has been feeling little bit jittery over the past month.  She then checked her blood pressure recently and found that it was as high as 190 systolic.  She was about to go on a 3-hour drive to [facility] and felt that she should probably get checked out before driving.  When nursing performed review of systems upon check-in, she did notice that she was having a very mild headache to the top and back of her head that feels like an ache.  Nothing makes this worse or better.  She states that if she had not been asked about this that she would not even notice her headache.  She also on review systems complains of feeling little bit of tightness when she tries to breathe.  However, she is not dyspneic at rest.  Additionally, she denies having any chest pain.  She is normally on irbesartan-hydrochlorothiazide combination pill for blood pressure control.      PAST MEDICAL HISTORY  Active Ambulatory Problems     Diagnosis Date Noted   • Vitamin D deficiency 07/18/2018   • Obstructive sleep apnea, adult 02/27/2019   • Gastroesophageal reflux disease 08/28/2019   • Lung nodule < 6cm on CT 12/28/2020   • Papillary thyroid carcinoma (HCC) 12/28/2020   • Essential hypertension 12/28/2020   • Bipolar 1 disorder (HCC) 12/28/2020   • Polyp of colon 01/08/2021   • Arellano's esophagus without dysplasia 02/22/2021     Resolved Ambulatory Problems     Diagnosis Date Noted   • Osteoarthritis of left thumb 04/16/2018   • Onychomycosis 05/07/2018     Past Medical History:   Diagnosis Date   • Arthritis    • Cancer (HCC)    • Depression    • Disease of thyroid gland    •  GERD (gastroesophageal reflux disease)    • Hypertension    • PONV (postoperative nausea and vomiting)    • Sleep apnea          PAST SURGICAL HISTORY  Past Surgical History:   Procedure Laterality Date   • BREAST BIOPSY     • BUNIONECTOMY Bilateral    • COLONOSCOPY  approx 2014    normal per pt   • COLONOSCOPY N/A 2/2/2021    Procedure: COLONOSCOPY INTO CECUM;  Surgeon: Angelito Kelley MD;  Location: [facility] ENDOSCOPY;  Service: Gastroenterology;  Laterality: N/A;  PRE: PERSONAL H/O COLON POLYPS  POST: DIVERTICULOSIS, TOROUS COLON   • DILATATION AND CURETTAGE     • ENDOSCOPY N/A 9/6/2019    Procedure: ESOPHAGOGASTRODUODENOSCOPY with cold biopsies and balloon dilitation size 15, 16.5, 18;  Surgeon: Angelito Kelley MD;  Location: [facility] ENDOSCOPY;  Service: Gastroenterology   • ENDOSCOPY N/A 3/12/2021    Procedure: ESOPHAGOGASTRODUODENOSCOPY WITH COLD BIOPSIES;  Surgeon: Angelito Kelley MD;  Location: [facility] ENDOSCOPY;  Service: Gastroenterology;  Laterality: N/A;  PRE: STORY'S ESOPHAGUS, REFLUX, DYSPHAGIA  POST: MILD ESOPHAGITIS, GASTRITIS, DUODENITIS   • EPIDURAL BLOCK     • HYSTERECTOMY     • THYROID SURGERY     • UPPER GASTROINTESTINAL ENDOSCOPY  approx 2014    pt doesn't recall          FAMILY HISTORY  Family History   Problem Relation Age of Onset   • Breast cancer Maternal Grandmother    • Breast cancer Paternal Grandmother    • Breast cancer Maternal Aunt    • Breast cancer Paternal Aunt    • Alcohol abuse Mother 43   • Heart disease Father    • Hyperlipidemia Father    • Parkinsonism Brother         bulbar supranuclear palsy         SOCIAL HISTORY  Social History     Socioeconomic History   • Marital status:    Tobacco Use   • Smoking status: Never Smoker   • Smokeless tobacco: Never Used   Vaping Use   • Vaping Use: Never used   Substance and Sexual Activity   • Alcohol use: Yes     Comment: 5+ drinks/night   • Drug use: No   • Sexual activity: Yes     Partners: Male     Birth  control/protection: Post-menopausal         ALLERGIES  Latex        REVIEW OF SYSTEMS  Review of Systems     All systems reviewed and negative except for those discussed in HPI.       PHYSICAL EXAM    I have reviewed the triage vital signs and nursing notes.    ED Triage Vitals [07/09/22 1236]   Temp Heart Rate Resp BP SpO2   98.7 °F (37.1 °C) 101 16 (!) 139/106 98 %      Temp src Heart Rate Source Patient Position BP Location FiO2 (%)   -- -- -- -- --       Physical Exam  GENERAL: not distressed  HENT: nares patent  EYES: no scleral icterus  CV: regular rhythm, regular rate  RESPIRATORY: normal effort, clear to auscultation bilaterally  ABDOMEN: soft, nontender  MUSCULOSKELETAL: no deformity  NEURO:   Recent and remote memory functions are normal. The patient is attentive with normal concentration. Language is fluent. Speech is clear. The speech is non-dysarthric. Fund of knowledge is normal.   Symmetric smile with no facial droop.  Eyes close shut strongly bilaterally.  Symmetric eyebrow raise bilaterally.  EOMI, PERRL  CN II-XII grossly normal otherwise.  5/5 strength to extremities.  No pronator drift.  Intact FNF.  No meningismus.  SKIN: warm, dry        LAB RESULTS  Recent Results (from the past 24 hour(s))   Comprehensive Metabolic Panel    Collection Time: 07/09/22  1:24 PM    Specimen: Blood   Result Value Ref Range    Glucose 97 65 - 99 mg/dL    BUN 14 8 - 23 mg/dL    Creatinine 0.78 0.57 - 1.00 mg/dL    Sodium 139 136 - 145 mmol/L    Potassium 3.8 3.5 - 5.2 mmol/L    Chloride 101 98 - 107 mmol/L    CO2 28.4 22.0 - 29.0 mmol/L    Calcium 9.4 8.6 - 10.5 mg/dL    Total Protein 6.3 6.0 - 8.5 g/dL    Albumin 4.30 3.50 - 5.20 g/dL    ALT (SGPT) 25 1 - 33 U/L    AST (SGOT) 20 1 - 32 U/L    Alkaline Phosphatase 63 39 - 117 U/L    Total Bilirubin 0.3 0.0 - 1.2 mg/dL    Globulin 2.0 gm/dL    A/G Ratio 2.2 g/dL    BUN/Creatinine Ratio 17.9 7.0 - 25.0    Anion Gap 9.6 5.0 - 15.0 mmol/L    eGFR 83.4 >60.0 mL/min/1.73    Troponin    Collection Time: 07/09/22  1:24 PM    Specimen: Blood   Result Value Ref Range    Troponin T <0.010 0.000 - 0.030 ng/mL   Green Top (Gel)    Collection Time: 07/09/22  1:24 PM   Result Value Ref Range    Extra Tube Hold for add-ons.    Lavender Top    Collection Time: 07/09/22  1:24 PM   Result Value Ref Range    Extra Tube hold for add-on    Gold Top - SST    Collection Time: 07/09/22  1:24 PM   Result Value Ref Range    Extra Tube Hold for add-ons.    Light Blue Top    Collection Time: 07/09/22  1:24 PM   Result Value Ref Range    Extra Tube Hold for add-ons.    CBC Auto Differential    Collection Time: 07/09/22  1:24 PM    Specimen: Blood   Result Value Ref Range    WBC 7.57 3.40 - 10.80 10*3/mm3    RBC 4.67 3.77 - 5.28 10*6/mm3    Hemoglobin 15.0 12.0 - 15.9 g/dL    Hematocrit 42.0 34.0 - 46.6 %    MCV 89.9 79.0 - 97.0 fL    MCH 32.1 26.6 - 33.0 pg    MCHC 35.7 31.5 - 35.7 g/dL    RDW 12.8 12.3 - 15.4 %    RDW-SD 41.5 37.0 - 54.0 fl    MPV 11.5 6.0 - 12.0 fL    Platelets 307 140 - 450 10*3/mm3    Neutrophil % 69.5 42.7 - 76.0 %    Lymphocyte % 17.7 (L) 19.6 - 45.3 %    Monocyte % 8.1 5.0 - 12.0 %    Eosinophil % 3.6 0.3 - 6.2 %    Basophil % 0.8 0.0 - 1.5 %    Immature Grans % 0.3 0.0 - 0.5 %    Neutrophils, Absolute 5.27 1.70 - 7.00 10*3/mm3    Lymphocytes, Absolute 1.34 0.70 - 3.10 10*3/mm3    Monocytes, Absolute 0.61 0.10 - 0.90 10*3/mm3    Eosinophils, Absolute 0.27 0.00 - 0.40 10*3/mm3    Basophils, Absolute 0.06 0.00 - 0.20 10*3/mm3    Immature Grans, Absolute 0.02 0.00 - 0.05 10*3/mm3    nRBC 0.0 0.0 - 0.2 /100 WBC       Ordered the above labs and independently reviewed the results.        RADIOLOGY  XR Chest 2 View    Result Date: 7/9/2022  Examination: PA and lateral chest radiographs  HISTORY: 67-year-old female with a history of chest pain  FINDINGS: PA and lateral chest radiographs were obtained. Comparison is made to prior examinations dated 6/10/2021. The lungs are normal in volume  and clear. The cardiomediastinal silhouette is normal in appearance. No bony abnormality of the thorax is appreciated.      Negative PA and lateral chest radiographs.  This report was finalized on 7/9/2022 1:13 PM by Dr. Marshal Sow M.D.        I ordered the above noted radiological studies. Reviewed by me and discussed with radiologist.  See dictation for official radiology interpretation.      PROCEDURES    Procedures      MEDICATIONS GIVEN IN ER    Medications   sodium chloride 0.9 % flush 10 mL (has no administration in time range)   aspirin tablet 325 mg (0 mg Oral Hold 7/9/22 1327)         PROGRESS, DATA ANALYSIS, CONSULTS, AND MEDICAL DECISION MAKING    All labs have been independently reviewed by me.  All radiology studies have been reviewed by me and discussed with radiologist dictating the report.   EKG's independently viewed and interpreted by me.  Discussion below represents my analysis of pertinent findings related to patient's condition, differential diagnosis, treatment plan and final disposition.    Patient presents with relatively asymptomatic hypertension.  She does endorse having mild dyspnea which she has experienced for months as well as a very dull headache that she would not have even noticed had she not been asked according to the patient.  Work-up in emergency department is unrevealing.  I do not believe that she needs a CT scan of her head given how mild her symptoms are.  Instead, we will intensify her outpatient regimen by maxing out her dose of irbesartan-hydrochlorothiazide.  She will follow-up in 1 week with her PCP.    ED Course as of 07/09/22 1650   Sat Jul 09, 2022   1448 Troponin T: <0.010 [TD]   1448 Creatinine: 0.78 [TD]   1448 Sodium: 139 [TD]   1448 WBC: 7.57 [TD]   1448 Hemoglobin: 15.0 [TD]   1505 Chest x-ray interpreted by myself.  No evidence of pneumonia.  No pulmonary edema. [TD]   1505 EKG interpreted by myself.  Time 12:40 PM.  Sinus rhythm.  Heart rate 83.  Left  atrial normality.  Normal axis.  No acute ST abnormality. [TD]      ED Course User Index  [TD] Ed Villatoro II, MD       I have just the patient's blood pressure medication.  Discussed return precautions with patient and her .    PPE: The patient wore a surgical mask throughout the entire patient encounter. I wore an N95.    AS OF 16:50 EDT VITALS:    BP - 161/79  HR - 59  TEMP - 98.7 °F (37.1 °C)  O2 SATS - 97%        DIAGNOSIS  Final diagnoses:   Primary hypertension   Shortness of breath         DISPOSITION  DISCHARGE    FOLLOW-UP  Deborah Rodgers MD  [address] [id number] [phone]    Schedule an appointment as soon as possible for a visit in 1 week           Medication List      New Prescriptions    irbesartan-hydrochlorothiazide 150-12.5 MG tablet  Commonly known as: AVALIDE  Take 2 tablets by mouth Daily for 30 days.  Replaces: irbesartan-hydrochlorothiazide 300-12.5 MG tablet        Stop    irbesartan-hydrochlorothiazide 300-12.5 MG tablet  Commonly known as: AVALIDE  Replaced by: irbesartan-hydrochlorothiazide 150-12.5 MG tablet           Where to Get Your Medications      These medications were sent to [id number] - [address] AT [facility] [phone] [facility] [phone] [address] [address]    Phone: [phone]   · irbesartan-hydrochlorothiazide 150-12.5 MG tablet                  Ed Villatoro II, MD  07/09/22 3944

## 2022-07-09 NOTE — ED TRIAGE NOTES
Patient to ER via car from home for hypertension. Patient states that she felt palpitations for the last few days so she has been taking her blood pressure and it was 190/101  Patient complains of headache at time of triage    Patient states she takes medication for high blood pressure

## 2022-07-11 LAB — QT INTERVAL: 349 MS

## 2022-07-13 DIAGNOSIS — I51.7 LEFT ATRIAL ENLARGEMENT: Primary | ICD-10-CM

## 2022-07-28 ENCOUNTER — OFFICE VISIT (OUTPATIENT)
Dept: GASTROENTEROLOGY | Facility: CLINIC | Age: 67
End: 2022-07-28

## 2022-07-28 VITALS
OXYGEN SATURATION: 93 % | HEIGHT: 67 IN | TEMPERATURE: 96.8 F | SYSTOLIC BLOOD PRESSURE: 112 MMHG | DIASTOLIC BLOOD PRESSURE: 74 MMHG | BODY MASS INDEX: 24.27 KG/M2 | HEART RATE: 74 BPM | WEIGHT: 154.6 LBS

## 2022-07-28 DIAGNOSIS — K21.9 GASTROESOPHAGEAL REFLUX DISEASE, UNSPECIFIED WHETHER ESOPHAGITIS PRESENT: Primary | ICD-10-CM

## 2022-07-28 DIAGNOSIS — K22.70 BARRETT'S ESOPHAGUS WITHOUT DYSPLASIA: ICD-10-CM

## 2022-07-28 DIAGNOSIS — K63.5 POLYP OF COLON, UNSPECIFIED PART OF COLON, UNSPECIFIED TYPE: ICD-10-CM

## 2022-07-28 DIAGNOSIS — R13.10 DYSPHAGIA, UNSPECIFIED TYPE: ICD-10-CM

## 2022-07-28 PROCEDURE — 99213 OFFICE O/P EST LOW 20 MIN: CPT | Performed by: INTERNAL MEDICINE

## 2022-07-28 RX ORDER — VILAZODONE HYDROCHLORIDE 40 MG/1
40 TABLET ORAL
COMMUNITY
Start: 2022-07-11 | End: 2023-03-15 | Stop reason: ALTCHOICE

## 2022-07-28 RX ORDER — SUCRALFATE 1 G/1
1 TABLET ORAL 4 TIMES DAILY
Qty: 120 TABLET | Refills: 5 | Status: SHIPPED | OUTPATIENT
Start: 2022-07-28 | End: 2023-03-15 | Stop reason: ALTCHOICE

## 2022-07-28 NOTE — PROGRESS NOTES
Chief Complaint   Patient presents with   • Heartburn        Cathy Gutiérrez is a  67 y.o. female here for a follow up visit for GERD, Arellano's esophagus, history of polyps    HPI this 67-year-old white female patient of Dr. Deborah Rodgers presents with a known history of reflux, Arellano's esophagus, and a history of polyps.  She also complains of some swallowing issues that have worsened for the past 6 months.  Specifically she is unable to tolerate liquids or solids without episodes of fullness in her throat and a globus sensation as well as occasional regurgitation or rumination.  We talked about her last upper endoscopy that was performed in 2021 and did not show any evidence of Arellano's changes.  There had been consideration at 1 time of an esophageal manometry although this was not completed.  She has been seen by speech pathology in the past.  I could not find records of a video fluoroscopy swallow study performed through this office.  I have offered a trial of Carafate suspension to be taken before meals and at bedtime and also would obtain a video fluoroscopy swallow study with speech path assessment.  If the studies and treatments are inconclusive then I would consider repeat endoscopy.    Past Medical History:   Diagnosis Date   • Arthritis    • Cancer (HCC)     throid   • Depression    • Disease of thyroid gland    • GERD (gastroesophageal reflux disease)    • Hypertension    • PONV (postoperative nausea and vomiting)    • Sleep apnea        Current Outpatient Medications   Medication Sig Dispense Refill   • amphetamine-dextroamphetamine (ADDERALL) 10 MG tablet Take 1 tablet by mouth Daily. 30 tablet 0   • Estradiol (IMVEXXY VA) Insert  into the vagina.     • estradiol (VIVELLE-DOT) 0.1 MG/24HR patch Place 1 patch on the skin as directed by provider 2 (Two) Times a Week. 24 patch 2   • irbesartan-hydrochlorothiazide (AVALIDE) 150-12.5 MG tablet Take 2 tablets by mouth Daily for 30 days. 60 tablet 0   •  lamoTRIgine  MG tablet sustained-release 24 hour Take 1 tablet by mouth 2 (two) times a day. 180 tablet 3   • levothyroxine (SYNTHROID, LEVOTHROID) 112 MCG tablet Take 1 tablet by mouth Every Morning Before Breakfast. 90 tablet 3   • liothyronine (CYTOMEL) 5 MCG tablet Take 0.5 tablets by mouth Daily. 45 tablet 3   • LORazepam (ATIVAN) 0.5 MG tablet Take 1 tablet by mouth Daily As Needed for anxiety 30 tablet 5   • ondansetron (ZOFRAN) 8 MG tablet Take 8 mg by mouth 3 (Three) Times a Day As Needed.     • pantoprazole (PROTONIX) 40 MG EC tablet Take 1 tablet by mouth Daily. For further refill, pt needs to make appt. 90 tablet 0   • Viibryd 40 MG tablet tablet Take 40 mg by mouth Daily With Breakfast.     • zolpidem (AMBIEN) 10 MG tablet Take 1 tablet by mouth At Night As Needed. 30 tablet 5   • hydrOXYzine (ATARAX) 25 MG tablet Take 1 tablet by mouth every night at bedtime. 15 tablet 1     No current facility-administered medications for this visit.       PRN Meds:.    Allergies   Allergen Reactions   • Latex Rash       Social History     Socioeconomic History   • Marital status:    Tobacco Use   • Smoking status: Never Smoker   • Smokeless tobacco: Never Used   Vaping Use   • Vaping Use: Never used   Substance and Sexual Activity   • Alcohol use: Yes     Comment: 5+ drinks/night   • Drug use: No   • Sexual activity: Yes     Partners: Male     Birth control/protection: Post-menopausal       Family History   Problem Relation Age of Onset   • Breast cancer Maternal Grandmother    • Breast cancer Paternal Grandmother    • Breast cancer Maternal Aunt    • Breast cancer Paternal Aunt    • Alcohol abuse Mother 43   • Heart disease Father    • Hyperlipidemia Father    • Parkinsonism Brother         bulbar supranuclear palsy       Review of Systems   Constitutional: Negative for activity change, appetite change, fatigue and unexpected weight change.   HENT: Negative for congestion, facial swelling, sore throat,  trouble swallowing and voice change.    Eyes: Negative for photophobia and visual disturbance.   Respiratory: Negative for cough and choking.    Cardiovascular: Negative for chest pain.   Gastrointestinal: Negative for abdominal distention, abdominal pain, anal bleeding, blood in stool, constipation, diarrhea, nausea, rectal pain and vomiting.        Dysphagia  Globus sensation  GERD   Endocrine: Negative for polyphagia.   Musculoskeletal: Negative for arthralgias, gait problem and joint swelling.   Skin: Negative for color change, pallor and rash.   Allergic/Immunologic: Negative for food allergies.   Neurological: Negative for speech difficulty and headaches.   Hematological: Does not bruise/bleed easily.   Psychiatric/Behavioral: Negative for agitation, confusion and sleep disturbance.       Vitals:    07/28/22 1452   BP: 112/74   Pulse: 74   Temp: 96.8 °F (36 °C)   SpO2: 93%       Physical Exam  Constitutional:       Appearance: She is well-developed.   HENT:      Head: Normocephalic.   Eyes:      Conjunctiva/sclera: Conjunctivae normal.   Cardiovascular:      Rate and Rhythm: Normal rate and regular rhythm.   Pulmonary:      Breath sounds: Normal breath sounds.   Abdominal:      General: Bowel sounds are normal.      Palpations: Abdomen is soft.   Musculoskeletal:         General: Normal range of motion.      Cervical back: Normal range of motion.   Skin:     General: Skin is warm and dry.   Neurological:      Mental Status: She is alert and oriented to person, place, and time.   Psychiatric:         Behavior: Behavior normal.         ASSESSMENT  #1 GERD: On PPI  #2 dysphagia: Possible transfer component  #3 Arellano's esophagus by history  #4 history of polyps      PLAN  Carafate 1 g to be taken in suspension form  Continue PPI  Schedule VFSS with speech pathology  Pending treatment response and study results may consider repeat endoscopy      ICD-10-CM ICD-9-CM   1. Gastroesophageal reflux disease, unspecified  whether esophagitis present  K21.9 530.81   2. Arellano's esophagus without dysplasia  K22.70 530.85   3. Polyp of colon, unspecified part of colon, unspecified type  K63.5 211.3

## 2022-08-02 DIAGNOSIS — R13.10 DYSPHAGIA, UNSPECIFIED TYPE: Primary | ICD-10-CM

## 2022-08-10 ENCOUNTER — APPOINTMENT (OUTPATIENT)
Dept: CARDIOLOGY | Facility: HOSPITAL | Age: 67
End: 2022-08-10

## 2022-08-15 ENCOUNTER — OFFICE VISIT (OUTPATIENT)
Dept: INTERNAL MEDICINE | Facility: CLINIC | Age: 67
End: 2022-08-15

## 2022-08-15 VITALS
DIASTOLIC BLOOD PRESSURE: 66 MMHG | HEART RATE: 76 BPM | WEIGHT: 154 LBS | TEMPERATURE: 97.3 F | SYSTOLIC BLOOD PRESSURE: 118 MMHG | HEIGHT: 67 IN | BODY MASS INDEX: 24.17 KG/M2

## 2022-08-15 DIAGNOSIS — I10 ESSENTIAL HYPERTENSION: Primary | ICD-10-CM

## 2022-08-15 DIAGNOSIS — F31.9 BIPOLAR 1 DISORDER: ICD-10-CM

## 2022-08-15 DIAGNOSIS — L65.9 HAIR LOSS: ICD-10-CM

## 2022-08-15 DIAGNOSIS — M17.10 OSTEOARTHRITIS, LOCALIZED, KNEE: ICD-10-CM

## 2022-08-15 PROCEDURE — 99214 OFFICE O/P EST MOD 30 MIN: CPT | Performed by: INTERNAL MEDICINE

## 2022-08-15 NOTE — PROGRESS NOTES
"Chief Complaint  Hypertension    Subjective        Cathy Gutiérrez presents to [facility]  History of Present Illness she is now in alcohol recovery, going to AA meetings.  Took naltrexone for a short time.  Denies DTs but did have some auditory hallucinations.  She is doing zoom meetings several times a day both for alcohol and bipolar d/o.    Went to ER with elevated BP in early July. They increased her irbesartan to 300/25 mg.  Readings at home have been @ 140/80s.  Her bipolar depression has not been well controlled- spoke to her psychiatrist this am who is going to change some of her medications.   L knee swells when she's very active, occ discomfort.   Hair falling out at a greater pace, feels thin on top, all over. Could be stress related but wonders if something els.e     Objective   Vital Signs:  /66   Pulse 76   Temp 97.3 °F (36.3 °C)   Ht 170.2 cm (67\")   Wt 69.9 kg (154 lb)   BMI 24.12 kg/m²   Estimated body mass index is 24.12 kg/m² as calculated from the following:    Height as of this encounter: 170.2 cm (67\").    Weight as of this encounter: 69.9 kg (154 lb).    BMI is within normal parameters. No other follow-up for BMI required.      Physical Exam  Constitutional:       Appearance: Normal appearance.   Cardiovascular:      Rate and Rhythm: Normal rate.   Musculoskeletal:         General: Swelling: L knee.      Right lower leg: No edema.      Left lower leg: No edema.        Result Review :  The following data was reviewed by: Deborah Rodgers MD on 08/15/2022:    Data reviewed: Recent hospitalization notes ER ion July          Assessment and Plan   Diagnoses and all orders for this visit:    1. Essential hypertension (Primary)  Comments:  much better- would leave as she is- I think she will continue to improve off alcohol.     2. Bipolar 1 disorder (HCC)  Comments:  agree with owrking with it the way she is.              Follow Up   Return for Keep " previously scheduled appointment.  Patient was given instructions and counseling regarding her condition or for health maintenance advice. Please see specific information pulled into the AVS if appropriate.

## 2022-08-16 LAB
FERRITIN SERPL-MCNC: 120 NG/ML (ref 15–150)
TSH SERPL DL<=0.005 MIU/L-ACNC: 1.77 UIU/ML (ref 0.45–4.5)

## 2022-09-13 RX ORDER — CYCLOBENZAPRINE HCL 10 MG
10 TABLET ORAL 3 TIMES DAILY PRN
Qty: 15 TABLET | Refills: 0 | Status: SHIPPED | OUTPATIENT
Start: 2022-09-13 | End: 2023-03-15

## 2023-02-15 ENCOUNTER — TRANSCRIBE ORDERS (OUTPATIENT)
Dept: ADMINISTRATIVE | Facility: HOSPITAL | Age: 68
End: 2023-02-15
Payer: MEDICARE

## 2023-02-15 DIAGNOSIS — Z12.31 BREAST CANCER SCREENING BY MAMMOGRAM: Primary | ICD-10-CM

## 2023-03-02 ENCOUNTER — HOSPITAL ENCOUNTER (OUTPATIENT)
Dept: GENERAL RADIOLOGY | Facility: HOSPITAL | Age: 68
Discharge: HOME | End: 2023-03-02
Payer: MEDICARE

## 2023-03-02 ENCOUNTER — HOSPITAL ENCOUNTER (OUTPATIENT)
Dept: CARDIOLOGY | Facility: HOSPITAL | Age: 68
Discharge: HOME | End: 2023-03-02
Payer: MEDICARE

## 2023-03-02 VITALS
HEART RATE: 58 BPM | HEIGHT: 67 IN | BODY MASS INDEX: 24.19 KG/M2 | DIASTOLIC BLOOD PRESSURE: 64 MMHG | SYSTOLIC BLOOD PRESSURE: 116 MMHG | WEIGHT: 154.1 LBS

## 2023-03-02 DIAGNOSIS — R13.10 DYSPHAGIA, UNSPECIFIED TYPE: ICD-10-CM

## 2023-03-02 DIAGNOSIS — I51.7 LEFT ATRIAL ENLARGEMENT: ICD-10-CM

## 2023-03-02 LAB
AORTIC ARCH: 2.3 CM
ASCENDING AORTA: 2.7 CM
BH CV ECHO MEAS - ACS: 1.71 CM
BH CV ECHO MEAS - AO MAX PG: 9 MMHG
BH CV ECHO MEAS - AO MEAN PG: 5.5 MMHG
BH CV ECHO MEAS - AO ROOT DIAM: 2.7 CM
BH CV ECHO MEAS - AO V2 MAX: 149.9 CM/SEC
BH CV ECHO MEAS - AO V2 VTI: 39.3 CM
BH CV ECHO MEAS - AVA(I,D): 3.3 CM2
BH CV ECHO MEAS - EDV(CUBED): 31.2 ML
BH CV ECHO MEAS - EDV(MOD-SP2): 78 ML
BH CV ECHO MEAS - EDV(MOD-SP4): 85 ML
BH CV ECHO MEAS - EF(MOD-BP): 66.5 %
BH CV ECHO MEAS - EF(MOD-SP2): 69.2 %
BH CV ECHO MEAS - EF(MOD-SP4): 65.9 %
BH CV ECHO MEAS - ESV(CUBED): 12.6 ML
BH CV ECHO MEAS - ESV(MOD-SP2): 24 ML
BH CV ECHO MEAS - ESV(MOD-SP4): 29 ML
BH CV ECHO MEAS - FS: 26.1 %
BH CV ECHO MEAS - IVS/LVPW: 1 CM
BH CV ECHO MEAS - IVSD: 1.3 CM
BH CV ECHO MEAS - LAT PEAK E' VEL: 7.2 CM/SEC
BH CV ECHO MEAS - LV DIASTOLIC VOL/BSA (35-75): 47 CM2
BH CV ECHO MEAS - LV MASS(C)D: 132.6 GRAMS
BH CV ECHO MEAS - LV MAX PG: 7.2 MMHG
BH CV ECHO MEAS - LV MEAN PG: 4.5 MMHG
BH CV ECHO MEAS - LV SYSTOLIC VOL/BSA (12-30): 16 CM2
BH CV ECHO MEAS - LV V1 MAX: 134.4 CM/SEC
BH CV ECHO MEAS - LV V1 VTI: 39.8 CM
BH CV ECHO MEAS - LVIDD: 3.1 CM
BH CV ECHO MEAS - LVIDS: 2.33 CM
BH CV ECHO MEAS - LVOT AREA: 3.2 CM2
BH CV ECHO MEAS - LVOT DIAM: 2.03 CM
BH CV ECHO MEAS - LVPWD: 1.3 CM
BH CV ECHO MEAS - MED PEAK E' VEL: 6.5 CM/SEC
BH CV ECHO MEAS - MV A DUR: 0.13 SEC
BH CV ECHO MEAS - MV A MAX VEL: 47.6 CM/SEC
BH CV ECHO MEAS - MV DEC SLOPE: 469.9 CM/SEC2
BH CV ECHO MEAS - MV DEC TIME: 0.23 MSEC
BH CV ECHO MEAS - MV E MAX VEL: 126 CM/SEC
BH CV ECHO MEAS - MV E/A: 2.6
BH CV ECHO MEAS - MV MAX PG: 8.1 MMHG
BH CV ECHO MEAS - MV MEAN PG: 2.31 MMHG
BH CV ECHO MEAS - MV P1/2T: 90.7 MSEC
BH CV ECHO MEAS - MV V2 VTI: 41.2 CM
BH CV ECHO MEAS - MVA(P1/2T): 2.43 CM2
BH CV ECHO MEAS - MVA(VTI): 3.1 CM2
BH CV ECHO MEAS - PA ACC TIME: 0.15 SEC
BH CV ECHO MEAS - PA PR(ACCEL): 10.9 MMHG
BH CV ECHO MEAS - PA V2 MAX: 96.6 CM/SEC
BH CV ECHO MEAS - PULM A REVS DUR: 0.18 SEC
BH CV ECHO MEAS - PULM A REVS VEL: 38.6 CM/SEC
BH CV ECHO MEAS - PULM DIAS VEL: 71 CM/SEC
BH CV ECHO MEAS - PULM S/D: 0.88
BH CV ECHO MEAS - PULM SYS VEL: 62.2 CM/SEC
BH CV ECHO MEAS - QP/QS: 0.52
BH CV ECHO MEAS - RV MAX PG: 2.41 MMHG
BH CV ECHO MEAS - RV V1 MAX: 77.7 CM/SEC
BH CV ECHO MEAS - RV V1 VTI: 20.7 CM
BH CV ECHO MEAS - RVOT DIAM: 2.03 CM
BH CV ECHO MEAS - SI(MOD-SP2): 29.9 ML/M2
BH CV ECHO MEAS - SI(MOD-SP4): 31 ML/M2
BH CV ECHO MEAS - SUP REN AO DIAM: 1.9 CM
BH CV ECHO MEAS - SV(LVOT): 129.3 ML
BH CV ECHO MEAS - SV(MOD-SP2): 54 ML
BH CV ECHO MEAS - SV(MOD-SP4): 56 ML
BH CV ECHO MEAS - SV(RVOT): 67.1 ML
BH CV ECHO MEAS - TAPSE (>1.6): 2.7 CM
BH CV ECHO MEASUREMENTS AVERAGE E/E' RATIO: 18.39
BH CV XLRA - RV BASE: 2.6 CM
BH CV XLRA - RV LENGTH: 6.4 CM
BH CV XLRA - RV MID: 2.7 CM
BH CV XLRA - TDI S': 10.4 CM/SEC
LEFT ATRIUM VOLUME INDEX: 43.9 ML/M2
MAXIMAL PREDICTED HEART RATE: 153 BPM
SINUS: 3 CM
STJ: 2.7 CM
STRESS TARGET HR: 130 BPM

## 2023-03-02 PROCEDURE — 92611 MOTION FLUOROSCOPY/SWALLOW: CPT

## 2023-03-02 PROCEDURE — 93306 TTE W/DOPPLER COMPLETE: CPT | Performed by: INTERNAL MEDICINE

## 2023-03-02 PROCEDURE — 93306 TTE W/DOPPLER COMPLETE: CPT

## 2023-03-02 PROCEDURE — 74230 X-RAY XM SWLNG FUNCJ C+: CPT

## 2023-03-02 RX ADMIN — BARIUM SULFATE 1 TEASPOON(S): 0.6 CREAM ORAL at 13:59

## 2023-03-02 RX ADMIN — BARIUM SULFATE 50 ML: 400 SUSPENSION ORAL at 13:59

## 2023-03-02 RX ADMIN — BARIUM SULFATE 4 ML: 980 POWDER, FOR SUSPENSION ORAL at 13:59

## 2023-03-02 RX ADMIN — BARIUM SULFATE 55 ML: 0.81 POWDER, FOR SUSPENSION ORAL at 13:59

## 2023-03-02 NOTE — MBS/VFSS/FEES
Speech Language Pathology   MBS FEES / Discharge Summary  [facility]       Patient Name: Cathy Gutiérrez  : 1955  MRN: [id number]    Today's Date: 3/2/2023      Visit Date: 2023     Visit Dx:     ICD-10-CM ICD-9-CM   1. Dysphagia, unspecified type  R13.10 787.20       Patient Active Problem List   Diagnosis   • Vitamin D deficiency   • Obstructive sleep apnea, adult   • Gastroesophageal reflux disease   • Lung nodule < 6cm on CT   • Papillary thyroid carcinoma (HCC)   • Essential hypertension   • Bipolar 1 disorder (HCC)   • Polyp of colon   • Story's esophagus without dysplasia        Past Medical History:   Diagnosis Date   • Arthritis    • Cancer (HCC)     throid   • Depression    • Disease of thyroid gland    • GERD (gastroesophageal reflux disease)    • Hypertension    • PONV (postoperative nausea and vomiting)    • Sleep apnea         Past Surgical History:   Procedure Laterality Date   • BREAST BIOPSY     • BUNIONECTOMY Bilateral    • COLONOSCOPY  approx     normal per pt   • COLONOSCOPY N/A 2021    Procedure: COLONOSCOPY INTO CECUM;  Surgeon: Angelito Kelley MD;  Location: [facility] ENDOSCOPY;  Service: Gastroenterology;  Laterality: N/A;  PRE: PERSONAL H/O COLON POLYPS  POST: DIVERTICULOSIS, TOROUS COLON   • DILATATION AND CURETTAGE     • ENDOSCOPY N/A 2019    Procedure: ESOPHAGOGASTRODUODENOSCOPY with cold biopsies and balloon dilitation size 15, 16.5, 18;  Surgeon: Angelito Kelley MD;  Location: [facility] ENDOSCOPY;  Service: Gastroenterology   • ENDOSCOPY N/A 3/12/2021    Procedure: ESOPHAGOGASTRODUODENOSCOPY WITH COLD BIOPSIES;  Surgeon: Angelito Kelley MD;  Location: [facility] ENDOSCOPY;  Service: Gastroenterology;  Laterality: N/A;  PRE: STORY'S ESOPHAGUS, REFLUX, DYSPHAGIA  POST: MILD ESOPHAGITIS, GASTRITIS, DUODENITIS   • EPIDURAL BLOCK     • HYSTERECTOMY     • THYROID SURGERY     • UPPER GASTROINTESTINAL ENDOSCOPY  approx     pt doesn't recall   "                 OP SLP Assessment/Plan - 03/02/23 1432        SLP Assessment    Functional Problems Swallowing  -CB    Impact on Function: Swallowing Risk of pneumonia  -CB    Clinical Impression: Swallowing Moderate-Severe:;esophageal dysphagia  -CB    Clinical Impression Comments Pt presents with mod-sev esophageal dysphagia, recommend referral to GI for further assessment.  No ST needed at this time.  -CB    Prognosis Fair (comment)  -CB    Patient would benefit from skilled therapy intervention No  -CB          User Key  (r) = Recorded By, (t) = Taken By, (c) = Cosigned By    Initials Name Provider Type    CB Cheryl Darden CCC-SLP Speech and Language Pathologist                 SLP Adult Swallow Evaluation     Row Name 03/02/23 1492       Rehab Evaluation    Document Type evaluation  -CB    Subjective Information no complaints  -CB    Patient Observations alert;cooperative;agree to therapy  -CB    Patient Effort adequate  -CB       General Information    Patient Profile Reviewed yes  -CB    Pertinent History Of Current Problem 67 year old female referred secondary \"food getting stuck\".  -CB    Current Method of Nutrition regular textures;thin liquids  -CB    Precautions/Limitations, Vision WFL;for purposes of eval  -CB    Precautions/Limitations, Hearing WFL;for purposes of eval  -CB    Prior Level of Function-Communication WFL  -CB    Prior Level of Function-Swallowing safe, efficient swallowing in all situations  -CB    Plans/Goals Discussed with patient;agreed upon  -CB    Patient's Goals for Discharge eat/drink without coughing/choking  -CB       MBS/VFSS Interpretation    VFSS Summary Dr. Bell, radiologist, present - natural dentition noted, oral motor function WNL.  Pt presents with functional oropharyngeal swallow, no penetration/aspiration noted with any consistency.  However, mild cricopharyngeal bar noted during swallow per MD.  Pt also demonstrated intraesophageal reflux post swallow.  Esophageal " sweep indicated achalasia per MD with bolus slow to empty distally with reflux noted.  Pt reported globus sensation in area of pharynx, however, pharyngeal area clear of bolus at this time.  Pt appears appropriate for regular diet/thin liquids, GERD precautions reviewed, MD recommends esophagram to further assess esophageal dysphagia.  No further ST needed at this time.  -CB       SLP Communication to Radiology    Summary Statement Dr. Bell, radiologist, present - natural dentition noted, oral motor function WNL.  Pt presents with functional oropharyngeal swallow, no penetration/aspiration noted with any consistency.  However, mild cricopharyngeal bar noted during swallow per MD.  Pt also demonstrated intraesophageal reflux post swallow.  Esophageal sweep indicated achalasia per MD with bolus slow to empty distally with reflux noted.  Pt reported globus sensation in area of pharynx, however, pharyngeal area clear of bolus at this time.  Pt appears appropriate for regular diet/thin liquids, GERD precautions reviewed, MD recommends esophagram to further assess esophageal dysphagia.  No further ST needed at this time  -CB       SLP Evaluation Clinical Impression    SLP Swallowing Diagnosis mod-severe;esophageal dysphagia  -CB    Functional Impact risk of malnutrition;risk of aspiration/pneumonia  -CB    Rehab Potential/Prognosis, Swallowing adequate, monitor progress closely  -CB    Swallow Criteria for Skilled Therapeutic Interventions Met no problems identified which require skilled intervention  -CB       Recommendations    Therapy Frequency (Swallow) evaluation only  -CB    SLP Diet Recommendation regular textures;thin liquids  -CB    Recommended Diagnostics No further SLP services recommended  -CB    Recommended Precautions and Strategies upright posture during/after eating;small bites of food and sips of liquid;reflux precautions;general aspiration precautions  -CB    Oral Care Recommendations Oral Care  BID/PRN  -CB    SLP Rec. for Method of Medication Administration meds whole;with thin liquids;as tolerated  -CB    Monitor for Signs of Aspiration yes;notify SLP if any concerns  -CB    Demonstrates Need for Referral to Another Service gastroenterology  -CB          User Key  (r) = Recorded By, (t) = Taken By, (c) = Cosigned By    Initials Name Provider Type    Cheryl Rios CCC-SLP Speech and Language Pathologist                                OP SLP Education     Row Name 03/02/23 1433       Education    Barriers to Learning No barriers identified  -CB    Education Provided Described results of evaluation;Patient expressed understanding of evaluation  -CB    Assessed Learning motivation  -CB    Learning Motivation Strong  -CB    Learning Method Explanation;Demonstration  -CB    Teaching Response Verbalized understanding  -CB          User Key  (r) = Recorded By, (t) = Taken By, (c) = Cosigned By    Initials Name Effective Dates    Cheryl Rios CCC-SLP 11/10/22 -                                    Time Calculation:   Untimed Charges  30618-BP Motion Fluoro Eval Swallow Minutes: 45  Total Minutes  Untimed Charges Total Minutes: 45   Total Minutes: 45    Therapy Charges for Today     Code Description Service Date Service Provider Modifiers Qty    [id number]  ST MOTION FLUORO EVAL SWALLOW 3 3/2/2023 Cheryl Darden CCC-SLP GN 1                  JEET Clarke  3/2/2023

## 2023-03-06 ENCOUNTER — TELEPHONE (OUTPATIENT)
Dept: INTERNAL MEDICINE | Facility: CLINIC | Age: 68
End: 2023-03-06

## 2023-03-06 DIAGNOSIS — C73 PAPILLARY THYROID CARCINOMA: Primary | ICD-10-CM

## 2023-03-06 DIAGNOSIS — Z00.00 PHYSICAL EXAM, ANNUAL: ICD-10-CM

## 2023-03-06 DIAGNOSIS — I10 ESSENTIAL HYPERTENSION: ICD-10-CM

## 2023-03-06 LAB
ALBUMIN SERPL-MCNC: 4.7 G/DL (ref 3.5–5.2)
ALBUMIN/GLOB SERPL: 2.5 G/DL
ALP SERPL-CCNC: 62 U/L (ref 39–117)
ALT SERPL-CCNC: 15 U/L (ref 1–33)
AST SERPL-CCNC: 17 U/L (ref 1–32)
BASOPHILS # BLD AUTO: 0.05 10*3/MM3 (ref 0–0.2)
BASOPHILS NFR BLD AUTO: 0.8 % (ref 0–1.5)
BILIRUB SERPL-MCNC: 0.4 MG/DL (ref 0–1.2)
BUN SERPL-MCNC: 18 MG/DL (ref 8–23)
BUN/CREAT SERPL: 21.4 (ref 7–25)
CALCIUM SERPL-MCNC: 9.6 MG/DL (ref 8.6–10.5)
CHLORIDE SERPL-SCNC: 99 MMOL/L (ref 98–107)
CHOLEST SERPL-MCNC: 257 MG/DL (ref 0–200)
CO2 SERPL-SCNC: 28.5 MMOL/L (ref 22–29)
CREAT SERPL-MCNC: 0.84 MG/DL (ref 0.57–1)
EGFRCR SERPLBLD CKD-EPI 2021: 76.3 ML/MIN/1.73
EOSINOPHIL # BLD AUTO: 0.19 10*3/MM3 (ref 0–0.4)
EOSINOPHIL NFR BLD AUTO: 3.2 % (ref 0.3–6.2)
ERYTHROCYTE [DISTWIDTH] IN BLOOD BY AUTOMATED COUNT: 13.4 % (ref 12.3–15.4)
GLOBULIN SER CALC-MCNC: 1.9 GM/DL
GLUCOSE SERPL-MCNC: 92 MG/DL (ref 65–99)
HCT VFR BLD AUTO: 43.1 % (ref 34–46.6)
HDLC SERPL-MCNC: 62 MG/DL (ref 40–60)
HGB BLD-MCNC: 13.9 G/DL (ref 12–15.9)
IMM GRANULOCYTES # BLD AUTO: 0.03 10*3/MM3 (ref 0–0.05)
IMM GRANULOCYTES NFR BLD AUTO: 0.5 % (ref 0–0.5)
LDLC SERPL CALC-MCNC: 173 MG/DL (ref 0–100)
LYMPHOCYTES # BLD AUTO: 1.41 10*3/MM3 (ref 0.7–3.1)
LYMPHOCYTES NFR BLD AUTO: 23.9 % (ref 19.6–45.3)
MCH RBC QN AUTO: 28.8 PG (ref 26.6–33)
MCHC RBC AUTO-ENTMCNC: 32.3 G/DL (ref 31.5–35.7)
MCV RBC AUTO: 89.2 FL (ref 79–97)
MONOCYTES # BLD AUTO: 0.54 10*3/MM3 (ref 0.1–0.9)
MONOCYTES NFR BLD AUTO: 9.2 % (ref 5–12)
NEUTROPHILS # BLD AUTO: 3.68 10*3/MM3 (ref 1.7–7)
NEUTROPHILS NFR BLD AUTO: 62.4 % (ref 42.7–76)
NRBC BLD AUTO-RTO: 0 /100 WBC (ref 0–0.2)
PLATELET # BLD AUTO: 300 10*3/MM3 (ref 140–450)
POTASSIUM SERPL-SCNC: 4.4 MMOL/L (ref 3.5–5.2)
PROT SERPL-MCNC: 6.6 G/DL (ref 6–8.5)
RBC # BLD AUTO: 4.83 10*6/MM3 (ref 3.77–5.28)
SODIUM SERPL-SCNC: 139 MMOL/L (ref 136–145)
TRIGL SERPL-MCNC: 126 MG/DL (ref 0–150)
TSH SERPL DL<=0.005 MIU/L-ACNC: 0.41 UIU/ML (ref 0.27–4.2)
VLDLC SERPL CALC-MCNC: 22 MG/DL (ref 5–40)
WBC # BLD AUTO: 5.9 10*3/MM3 (ref 3.4–10.8)

## 2023-03-06 NOTE — TELEPHONE ENCOUNTER
Caller: Cathy Gutiérrez    Relationship to patient: Self    Best call back number: [phone]    Additional notes: PATIENT HAD AN APPOINTMENT TODAY, 03/06/23 AT 1:30 PM FOR LABS, BUT CAME IN AT 8:00 AM AND JUST WANTED TO MAKE OFFICE AWARE. IT IS STILL SHOWING ON THE SCHEDULE.       HUB ATTEMPTED TO WARM TRANSFER TO OFFICE TO MAKE THEM AWARE AND WAS UNSUCCESSFUL.

## 2023-03-15 ENCOUNTER — OFFICE VISIT (OUTPATIENT)
Dept: INTERNAL MEDICINE | Facility: CLINIC | Age: 68
End: 2023-03-15
Payer: MEDICARE

## 2023-03-15 VITALS
SYSTOLIC BLOOD PRESSURE: 130 MMHG | HEIGHT: 67 IN | BODY MASS INDEX: 24.33 KG/M2 | DIASTOLIC BLOOD PRESSURE: 76 MMHG | WEIGHT: 155 LBS | HEART RATE: 78 BPM

## 2023-03-15 DIAGNOSIS — Z00.00 MEDICARE ANNUAL WELLNESS VISIT, SUBSEQUENT: ICD-10-CM

## 2023-03-15 DIAGNOSIS — M25.562 CHRONIC PAIN OF LEFT KNEE: Primary | ICD-10-CM

## 2023-03-15 DIAGNOSIS — R32 URINARY INCONTINENCE, UNSPECIFIED TYPE: ICD-10-CM

## 2023-03-15 DIAGNOSIS — C73 PAPILLARY THYROID CARCINOMA: ICD-10-CM

## 2023-03-15 DIAGNOSIS — G89.29 CHRONIC PAIN OF LEFT KNEE: Primary | ICD-10-CM

## 2023-03-15 DIAGNOSIS — I10 ESSENTIAL HYPERTENSION: ICD-10-CM

## 2023-03-15 DIAGNOSIS — F31.9 BIPOLAR 1 DISORDER: ICD-10-CM

## 2023-03-15 PROCEDURE — 1159F MED LIST DOCD IN RCRD: CPT | Performed by: INTERNAL MEDICINE

## 2023-03-15 PROCEDURE — 3078F DIAST BP <80 MM HG: CPT | Performed by: INTERNAL MEDICINE

## 2023-03-15 PROCEDURE — 3075F SYST BP GE 130 - 139MM HG: CPT | Performed by: INTERNAL MEDICINE

## 2023-03-15 PROCEDURE — G0439 PPPS, SUBSEQ VISIT: HCPCS | Performed by: INTERNAL MEDICINE

## 2023-03-15 PROCEDURE — 1160F RVW MEDS BY RX/DR IN RCRD: CPT | Performed by: INTERNAL MEDICINE

## 2023-03-15 PROCEDURE — 1170F FXNL STATUS ASSESSED: CPT | Performed by: INTERNAL MEDICINE

## 2023-03-15 RX ORDER — LURASIDONE HYDROCHLORIDE 40 MG/1
40 TABLET, FILM COATED ORAL DAILY
COMMUNITY

## 2023-03-15 NOTE — PROGRESS NOTES
The ABCs of the Annual Wellness Visit  Subsequent Medicare Wellness Visit    Akira Gutiérrez is a 67 y.o. female who presents for a Subsequent Medicare Wellness Visit.    The following portions of the patient's history were reviewed and   updated as appropriate: allergies, current medications, past family history, past medical history, past social history, past surgical history and problem list.    Compared to one year ago, the patient feels her physical   health is worse.    Compared to one year ago, the patient feels her mental   health is the same.    Recent Hospitalizations:  She was not admitted to the hospital during the last year.       Current Medical Providers:  Patient Care Team:  Deborah Rodgers MD as PCP - General (Internal Medicine)  Royer Jones MD (Psychiatry)  Myrtle Anthony MD as Consulting Physician (Endocrinology)    Outpatient Medications Prior to Visit   Medication Sig Dispense Refill   • amphetamine-dextroamphetamine (ADDERALL) 10 MG tablet Take 1 tablet by mouth Daily. 30 tablet 0   • Estradiol (IMVEXXY VA) Insert  into the vagina.     • estradiol (VIVELLE-DOT) 0.1 MG/24HR patch Place 1 patch on the skin as directed by provider 2 (Two) Times a Week. 24 patch 2   • lamoTRIgine  MG tablet sustained-release 24 hour Take 1 tablet by mouth 2 (two) times a day. 180 tablet 3   • levothyroxine (SYNTHROID, LEVOTHROID) 112 MCG tablet Take 1 tablet by mouth Every Morning Before Breakfast. 90 tablet 3   • LORazepam (ATIVAN) 0.5 MG tablet Take 1 tablet by mouth Daily As Needed for anxiety 30 tablet 5   • lurasidone (LATUDA) 40 MG tablet tablet Take 1 tablet by mouth Daily.     • ondansetron (ZOFRAN) 8 MG tablet Take 1 tablet by mouth 3 (Three) Times a Day As Needed.     • pantoprazole (PROTONIX) 40 MG EC tablet Take 1 tablet by mouth Daily. For further refill, pt needs to make appt. 90 tablet 0   • zolpidem (AMBIEN) 10 MG tablet Take 1 tablet by mouth At Night As Needed. 30 tablet  "5   • irbesartan-hydrochlorothiazide (AVALIDE) 150-12.5 MG tablet Take 2 tablets by mouth Daily for 30 days. 60 tablet 0   • Viibryd 40 MG tablet tablet Take 1 tablet by mouth Daily With Breakfast.     • cyclobenzaprine (FLEXERIL) 10 MG tablet Take 1 tablet by mouth 3 (Three) Times a Day As Needed for Muscle Spasms. 15 tablet 0   • sucralfate (Carafate) 1 g tablet Take 1 tablet by mouth 4 (Four) Times a Day. 120 tablet 5     No facility-administered medications prior to visit.       No opioid medication identified on active medication list. I have reviewed chart for other potential  high risk medication/s and harmful drug interactions in the elderly.          Aspirin is not on active medication list.  Aspirin use is not indicated based on review of current medical condition/s. Risk of harm outweighs potential benefits.  .    Patient Active Problem List   Diagnosis   • Vitamin D deficiency   • Obstructive sleep apnea, adult   • Gastroesophageal reflux disease   • Lung nodule < 6cm on CT   • Papillary thyroid carcinoma (HCC)   • Essential hypertension   • Bipolar 1 disorder (HCC)   • Polyp of colon   • Arellano's esophagus without dysplasia     Advance Care Planning  Advance Directive is not on file.  ACP discussion was held with the patient during this visit. Patient has an advance directive (not in EMR), copy requested.     Objective    Vitals:    03/15/23 1329   BP: 130/76   Pulse: 78   Weight: 70.3 kg (155 lb)   Height: 170 cm (66.93\")     Estimated body mass index is 24.33 kg/m² as calculated from the following:    Height as of this encounter: 170 cm (66.93\").    Weight as of this encounter: 70.3 kg (155 lb).    BMI is within normal parameters. No other follow-up for BMI required.      Does the patient have evidence of cognitive impairment? No    Lab Results   Component Value Date    CHLPL 257 (H) 03/06/2023    TRIG 126 03/06/2023    HDL 62 (H) 03/06/2023     (H) 03/06/2023    VLDL 22 03/06/2023      "   HEALTH RISK ASSESSMENT    Smoking Status:  Social History     Tobacco Use   Smoking Status Never   Smokeless Tobacco Never     Alcohol Consumption:  Social History     Substance and Sexual Activity   Alcohol Use Yes    Comment: 5+ drinks/night     Fall Risk Screen:    ZAFAR Fall Risk Assessment was completed, and patient is at LOW risk for falls.Assessment completed on:3/15/2023    Depression Screening:  PHQ-2/PHQ-9 Depression Screening 3/15/2023   Little Interest or Pleasure in Doing Things 0-->not at all   Feeling Down, Depressed or Hopeless 0-->not at all   PHQ-9: Brief Depression Severity Measure Score 0       Health Habits and Functional and Cognitive Screening:  Functional & Cognitive Status 3/15/2023   Do you have difficulty preparing food and eating? No   Do you have difficulty bathing yourself, getting dressed or grooming yourself? No   Do you have difficulty using the toilet? No   Do you have difficulty moving around from place to place? No   Do you have trouble with steps or getting out of a bed or a chair? No   Current Diet Well Balanced Diet   Dental Exam Up to date   Eye Exam Up to date   Exercise (times per week) 0 times per week   Current Exercises Include No Regular Exercise   Do you need help using the phone?  No   Are you deaf or do you have serious difficulty hearing?  No   Do you need help with transportation? No   Do you need help shopping? No   Do you need help preparing meals?  No   Do you need help with housework?  No   Do you need help with laundry? No   Do you need help taking your medications? No   Do you need help managing money? No   Do you ever drive or ride in a car without wearing a seat belt? No   Have you felt unusual stress, anger or loneliness in the last month? No   Who do you live with? Spouse   If you need help, do you have trouble finding someone available to you? No   Have you been bothered in the last four weeks by sexual problems? No   Do you have difficulty  concentrating, remembering or making decisions? No       Age-appropriate Screening Schedule:  Refer to the list below for future screening recommendations based on patient's age, sex and/or medical conditions. Orders for these recommended tests are listed in the plan section. The patient has been provided with a written plan.    Health Maintenance   Topic Date Due   • COVID-19 Vaccine (4 - Booster for Pfizer series) 11/27/2021   • Pneumococcal Vaccine 65+ (2 - PPSV23 if available, else PCV20) 12/28/2021   • INFLUENZA VACCINE  08/01/2022   • ANNUAL WELLNESS VISIT  03/09/2023   • DXA SCAN  03/24/2023   • PAP SMEAR  02/25/2024   • GASTROSCOPY (EGD)  03/12/2024   • MAMMOGRAM  03/30/2024   • COLORECTAL CANCER SCREENING  02/02/2026   • TDAP/TD VACCINES (2 - Td or Tdap) 12/16/2031   • HEPATITIS C SCREENING  Completed   • ZOSTER VACCINE  Completed                CMS Preventative Services Quick Reference  Risk Factors Identified During Encounter  Immunizations Discussed/Encouraged: Influenza and Prevnar 20 (Pneumococcal 20-valent conjugate)  The above risks/problems have been discussed with the patient.  Pertinent information has been shared with the patient in the After Visit Summary.  An After Visit Summary and PPPS were made available to the patient.    Follow Up:   Next Medicare Wellness visit to be scheduled in 1 year.       Additional E&M Note during same encounter follows:  Patient has multiple medical problems which are significant and separately identifiable that require additional work above and beyond the Medicare Wellness Visit.      Chief Complaint  Medicare Wellness-subsequent    Subjective        HPI  Cathy MOSQUEDA Marla is also being seen today for knee pain - told a few years ago for L knee pain, told meniscal tear  Remains sober now for 9 months- does AA every day.  Started doing an AA nutrition session due to some sugar addictions.   She is not exercising- working with therapist and Dr. Jones for her  "Bipolar d/o.She isn't getting out of her house much- lives with .     Review of Systems   HENT: Positive for trouble swallowing. Negative for congestion and dental problem.    Eyes: Negative for visual disturbance.   Respiratory: Negative for cough and shortness of breath.    Cardiovascular: Negative for chest pain and leg swelling.   Gastrointestinal: Negative for abdominal pain.   Genitourinary: Negative for difficulty urinating.   Musculoskeletal: Positive for joint swelling (L knee).   Psychiatric/Behavioral: Positive for dysphoric mood. Negative for agitation, behavioral problems and self-injury.       Objective   Vital Signs:  /76   Pulse 78   Ht 170 cm (66.93\")   Wt 70.3 kg (155 lb)   BMI 24.33 kg/m²     Physical Exam  Constitutional:       Appearance: Normal appearance.   Cardiovascular:      Rate and Rhythm: Normal rate and regular rhythm.      Pulses: Normal pulses.      Heart sounds: Normal heart sounds.   Pulmonary:      Effort: Pulmonary effort is normal.      Breath sounds: Normal breath sounds.   Chest:   Breasts:     Right: Normal.      Left: Normal.   Musculoskeletal:      Right lower leg: No edema.      Left lower leg: No edema.   Skin:     General: Skin is warm and dry.      Comments: L shoulder with waxy stuck on lesion,no atypia   Neurological:      General: No focal deficit present.   Psychiatric:         Mood and Affect: Mood normal.         Thought Content: Thought content normal.          The following data was reviewed by: Deborah Rodgers MD on 03/15/2023:  Common labs    Common Labs 7/9/22 7/9/22 3/6/23 3/6/23 3/6/23    1324 1324 0802 0802 0802   Glucose  97   92   BUN  14   18   Creatinine  0.78   0.84   Sodium  139   139   Potassium  3.8   4.4   Chloride  101   99   Calcium  9.4   9.6   Total Protein     6.6   Albumin  4.30   4.7   Total Bilirubin  0.3   0.4   Alkaline Phosphatase  63   62   AST (SGOT)  20   17   ALT (SGPT)  25   15   WBC 7.57  5.90     Hemoglobin 15.0  " 13.9     Hematocrit 42.0  43.1     Platelets 307  300     Total Cholesterol    257 (A)    Triglycerides    126    HDL Cholesterol    62 (A)    LDL Cholesterol     173 (A)    (A) Abnormal value       Comments are available for some flowsheets but are not being displayed.           Data reviewed: Consultant notes pulm, endo      The 10-year ASCVD risk score (Shae RIVERA, et al., 2019) is: 7.6%    Values used to calculate the score:      Age: 67 years      Sex: Female      Is Non- : No      Diabetic: No      Tobacco smoker: No      Systolic Blood Pressure: 130 mmHg      Is BP treated: No      HDL Cholesterol: 62 mg/dL      Total Cholesterol: 257 mg/dL       Assessment and Plan   Diagnoses and all orders for this visit:    1. Chronic pain of left knee (Primary)  Comments:  would like to see ortho again to review her options. discussed exercise   Orders:  -     Ambulatory Referral to Orthopedic Surgery    2. Urinary incontinence, unspecified type  Comments:  referral back to Dr. Lawson.   Orders:  -     Ambulatory Referral to Gynecologic Urology    3. Bipolar 1 disorder (HCC)  Comments:  encouraged her to do a little something every day outside- maybe start with some yard work. continue f/u with Dr. Jones.     4. Essential hypertension  Comments:  controlled    5. Papillary thyroid carcinoma (HCC)  Comments:  replacement at goal    6. Medicare annual wellness visit, subsequent  Comments:  Reviewed labs/exam benign.  get Prevnar at f/u since she is between Shingrix vaccines.  Discussed short walks/day. Encouraged cotinued AA meetings.              Follow Up   Return in about 6 months (around 9/15/2023) for Recheck.  Patient was given instructions and counseling regarding her condition or for health maintenance advice. Please see specific information pulled into the AVS if appropriate.

## 2023-04-03 ENCOUNTER — HOSPITAL ENCOUNTER (OUTPATIENT)
Dept: MAMMOGRAPHY | Facility: HOSPITAL | Age: 68
Discharge: HOME | End: 2023-04-03
Admitting: INTERNAL MEDICINE
Payer: MEDICARE

## 2023-04-03 DIAGNOSIS — Z12.31 BREAST CANCER SCREENING BY MAMMOGRAM: ICD-10-CM

## 2023-04-03 PROCEDURE — 77063 BREAST TOMOSYNTHESIS BI: CPT

## 2023-04-03 PROCEDURE — 77067 SCR MAMMO BI INCL CAD: CPT

## 2023-04-05 ENCOUNTER — TELEPHONE (OUTPATIENT)
Dept: INTERNAL MEDICINE | Facility: CLINIC | Age: 68
End: 2023-04-05

## 2023-04-05 DIAGNOSIS — R92.8 ABNORMAL MAMMOGRAM: Primary | ICD-10-CM

## 2023-04-05 NOTE — TELEPHONE ENCOUNTER
"  UNABLE TO WARM TRANSFER.    Caller: Cathy Gutiérrez \"Eileen\"    Relationship to patient: Self    Patient is needing:   PATIENT WAS RETURNING [facility]S CALL. SHE IS AVAILABLE BEFORE 2PM ([facility] TIME)     [phone] (OK TO LEAVE MESSAGE)  "

## 2023-04-13 ENCOUNTER — OFFICE VISIT (OUTPATIENT)
Dept: ORTHOPEDIC SURGERY | Facility: CLINIC | Age: 68
End: 2023-04-13
Payer: MEDICARE

## 2023-04-13 VITALS — TEMPERATURE: 97.8 F | HEIGHT: 67 IN | WEIGHT: 160 LBS | BODY MASS INDEX: 25.11 KG/M2

## 2023-04-13 DIAGNOSIS — R52 PAIN: ICD-10-CM

## 2023-04-13 DIAGNOSIS — M76.32 ILIOTIBIAL BAND TENDINITIS OF LEFT SIDE: ICD-10-CM

## 2023-04-13 DIAGNOSIS — M17.0 PRIMARY OSTEOARTHRITIS OF BOTH KNEES: Primary | ICD-10-CM

## 2023-04-13 PROCEDURE — 1160F RVW MEDS BY RX/DR IN RCRD: CPT | Performed by: ORTHOPAEDIC SURGERY

## 2023-04-13 PROCEDURE — 1159F MED LIST DOCD IN RCRD: CPT | Performed by: ORTHOPAEDIC SURGERY

## 2023-04-13 PROCEDURE — 99213 OFFICE O/P EST LOW 20 MIN: CPT | Performed by: ORTHOPAEDIC SURGERY

## 2023-04-13 PROCEDURE — 73562 X-RAY EXAM OF KNEE 3: CPT | Performed by: ORTHOPAEDIC SURGERY

## 2023-04-16 NOTE — PROGRESS NOTES
Patient: Cathy Gutiérrez  YOB: 1955 68 y.o. female  Medical Record Number: [id number]    Chief Complaints:   Chief Complaint   Patient presents with   • Left Knee - Follow-up       History of Present Illness:Cathy Gutiérrez is a 68 y.o. female who presents for follow-up of left knee pain.  Patient was seen several years ago diagnosed with possible meniscal injury of the left knee and some degenerative changes noted.  She has been doing okay over the time however she has noticed some more lateral type knee pain and distal thigh pain with increased activity.  She denies any mechanical type symptoms.  No recent trauma.    Allergies:   Allergies   Allergen Reactions   • Latex Rash       Medications:   Current Outpatient Medications   Medication Sig Dispense Refill   • amphetamine-dextroamphetamine (ADDERALL) 10 MG tablet Take 1 tablet by mouth Daily. 30 tablet 0   • Estradiol (IMVEXXY VA) Insert  into the vagina.     • estradiol (VIVELLE-DOT) 0.1 MG/24HR patch Place 1 patch on the skin as directed by provider 2 (Two) Times a Week. 24 patch 2   • lamoTRIgine  MG tablet sustained-release 24 hour Take 1 tablet by mouth 2 (two) times a day. 180 tablet 3   • levothyroxine (SYNTHROID, LEVOTHROID) 112 MCG tablet Take 1 tablet by mouth Every Morning Before Breakfast. 90 tablet 3   • LORazepam (ATIVAN) 0.5 MG tablet Take 1 tablet by mouth Daily As Needed for anxiety 30 tablet 5   • lurasidone (LATUDA) 40 MG tablet tablet Take 1 tablet by mouth Daily.     • ondansetron (ZOFRAN) 8 MG tablet Take 1 tablet by mouth 3 (Three) Times a Day As Needed.     • pantoprazole (PROTONIX) 40 MG EC tablet Take 1 tablet by mouth Daily. For further refill, pt needs to make appt. 90 tablet 0   • zolpidem (AMBIEN) 10 MG tablet Take 1 tablet by mouth At Night As Needed. 30 tablet 5   • irbesartan-hydrochlorothiazide (AVALIDE) 150-12.5 MG tablet Take 2 tablets by mouth Daily for 30 days. 60 tablet 0     No current  "facility-administered medications for this visit.         The following portions of the patient's history were reviewed and updated as appropriate: allergies, current medications, past family history, past medical history, past social history, past surgical history and problem list.    Review of Systems:   A 14 point review of systems was performed. All systems negative except pertinent positives/negative listed in HPI above    Physical Exam:   Vitals:    04/13/23 1536   Temp: 97.8 °F (36.6 °C)   TempSrc: Temporal   Weight: 72.6 kg (160 lb)   Height: 170.2 cm (67\")   PainSc:   9   PainLoc: Knee       General: A and O x 3, ASA, NAD    SCLERA:    Normal    DENTITION:   Normal      Left lower extremity is examined no medial joint line tenderness.  Some tenderness along the lateral joint line particular along the IT band insertion.  No swelling noted.  Overall knee range of motion is full and painless.  Positive patellar grind sign.  Negative Cecilio's.  Motor and sensory intact distally.    Radiology:  Xrays 4views (ap,lateral, PA flexion, sunrise) were ordered and reviewed for evaluation of knee pain demonstrating degenerative changes through all compartments with the lateral compartment most affected.  Compared to previous films there appears to be an increase in joint space loss particular involving the lateral compartment.  todays xrays were compared to previous xrays and show new findings as described above    Assessment/Plan:  Left knee osteoarthritis, IT band tendinitis    I discussed the findings with the patient and we will proceed with conservative treatment at this time consisting of rest, ice, active modification, anti-inflammatories oral Intergel as well as physical therapy.  If her symptoms continue or change we can reevaluate and consider possible additional therapy such as injections if warranted.  Patient is understanding this.  All questions answered.  Patient will follow-up as needed.    "

## 2023-05-15 ENCOUNTER — TELEPHONE (OUTPATIENT)
Dept: GASTROENTEROLOGY | Facility: CLINIC | Age: 68
End: 2023-05-15
Payer: MEDICARE

## 2023-05-15 ENCOUNTER — HOSPITAL ENCOUNTER (OUTPATIENT)
Dept: ULTRASOUND IMAGING | Facility: HOSPITAL | Age: 68
Discharge: HOME | End: 2023-05-15
Payer: MEDICARE

## 2023-05-15 ENCOUNTER — HOSPITAL ENCOUNTER (OUTPATIENT)
Dept: MAMMOGRAPHY | Facility: HOSPITAL | Age: 68
Discharge: HOME | End: 2023-05-15
Payer: MEDICARE

## 2023-05-15 DIAGNOSIS — R92.8 ABNORMAL MAMMOGRAM: ICD-10-CM

## 2023-05-15 DIAGNOSIS — R13.10 DYSPHAGIA, UNSPECIFIED TYPE: Primary | ICD-10-CM

## 2023-05-15 PROCEDURE — 77065 DX MAMMO INCL CAD UNI: CPT

## 2023-05-15 PROCEDURE — G0279 TOMOSYNTHESIS, MAMMO: HCPCS

## 2023-05-15 PROCEDURE — 76642 ULTRASOUND BREAST LIMITED: CPT

## 2023-05-15 NOTE — TELEPHONE ENCOUNTER
Call to pt . Advise per DR Kelley note.  Verb understanding.     States would like to proceed with esophagram.  Advise [employer] [facility] will contact.  If have not heard in timely manner, may call [phone] to arrange.     Esophagram order placed - message to DR Kelley.

## 2023-05-15 NOTE — TELEPHONE ENCOUNTER
----- Message from Angelito ZEPEDA MD sent at 3/6/2023  3:20 PM EST -----  Regarding: [facility]SS results  Okay to call results, evidently the radiologist would like a regular esophagram performed to rule out problems in the lower esophagus such as achalasia.  Would offer this to the patient and pending those results may warrant repeat endoscopy if the source of her swallowing issues is not defined.  ----- Message -----  From: [facility] [place] [facility]  Sent: 3/2/2023   3:06 PM EST  To: Angelito ZEPEDA MD

## 2023-08-01 ENCOUNTER — TREATMENT (OUTPATIENT)
Dept: PHYSICAL THERAPY | Facility: CLINIC | Age: 68
End: 2023-08-01
Payer: MEDICARE

## 2023-08-01 DIAGNOSIS — M17.0 PRIMARY OSTEOARTHRITIS OF BOTH KNEES: Primary | ICD-10-CM

## 2023-08-01 DIAGNOSIS — M76.32 ILIOTIBIAL BAND TENDINITIS OF LEFT SIDE: ICD-10-CM

## 2023-08-01 PROCEDURE — 97530 THERAPEUTIC ACTIVITIES: CPT | Performed by: PHYSICAL THERAPIST

## 2023-08-01 PROCEDURE — 97110 THERAPEUTIC EXERCISES: CPT | Performed by: PHYSICAL THERAPIST

## 2023-08-07 ENCOUNTER — TREATMENT (OUTPATIENT)
Dept: PHYSICAL THERAPY | Facility: CLINIC | Age: 68
End: 2023-08-07
Payer: MEDICARE

## 2023-08-07 DIAGNOSIS — M17.0 PRIMARY OSTEOARTHRITIS OF BOTH KNEES: Primary | ICD-10-CM

## 2023-08-07 PROCEDURE — 97530 THERAPEUTIC ACTIVITIES: CPT | Performed by: PHYSICAL THERAPIST

## 2023-08-07 PROCEDURE — 97110 THERAPEUTIC EXERCISES: CPT | Performed by: PHYSICAL THERAPIST

## 2023-08-07 NOTE — PROGRESS NOTES
Physical Therapy Daily Note    Patient Name: Cathy Gutiérrez         :  1955  Referring Physician: Ed Tony MD  Treatment Date: 2023  Date of Initial Visit: No linked episodes    Visit Diagnoses:    ICD-10-CM ICD-9-CM   1. Primary osteoarthritis of both knees  M17.0 715.16       _____________________________________________________    Pt was 9 min late    Subjective   Cathy Gutiérrez reports: tape was ok - good for a few days - more difficult to go down stairs - a lot of stairs in her house and has to carry her granddaughter - Tape is helpful - decreased swelling - Took a walk w/ granddaughter w/o problems -   Pain in R/L knees -     Objective   Pt able to perform Fwd and lateral step up (3rd hole from bottom) w/o pain w/ knees taped -     EXERCISES:   ISOMETRIC LEG PRESS into BALL;  20/5 sec ea (B); R-L  (TOO PAINFUL FOR (L) KNEE)   SUPINE HS CURL w/ LEGS on BALL 20 w/ core emphasis  BRIDGE on heels 15/5 sec  (c/o (L) LBP on ball)  SIDE STEP 30 ft each R/L  MONSTER WALK (Fwd/Retro) 30 ft ea  STEP UP- Hole 3 from bottom; 15 ea R/L  LAT STEP UP w/ ABD - Hole 3 from bottom: 15 ea R/L  SIT STAND x 15 (24 in off floor) w/o hands  UPDATED HEP and REVIEWED w/ PATIENT   NUSTEP Seat 9   L5  x 5 min  ADD:  MATRIX HIP ADD  MATRIX HIP ABD  SLS  BALANCE BOARD as austin      FUNCTIONAL ACTIVITIES:   TAPING / BRACING: K-tape to Unload (B) PF Jt to alleviate pain and allow improved gait / function and tolerance to PT activities and mm firing.  SEE EXERCISE FLOW SHEET -      Assessment/Plan  67 yo female; (B) knee pain PF pain; Knee OA ;   Kinesio taping helpful in  reducing her knee pain and improving gait and tolerance to ADLs and PT activities - -   Pain w/ isolated (L) Isometric leg press      Progress strengthening /stabilization /functional activity- progress conservatively based on pain -      _________________________________________________     Manual Therapy:                 mins  66592;  Therapeutic  Exercise:   15    mins  43346;     Neuromuscular Narda:        mins  02043;    Therapeutic Activity:     23      mins  82020;     Ultrasound:                          mins  71158;  Electrical Stimulation:         mins  38350 ( );  Dry Needling                       mins self-pay     Timed Treatment:   38   mins                  Total Treatment:     38   mins        Александр Chavarria PT  Physical Therapist  Lic # [id number]    Access Code: [email or web address]  URL: [email or web address]  Date: 08/07/2023  Prepared by: Margarito Chavarria    Exercises  - Hooklying Gluteal Sets  - 2-3 x daily - 7 x weekly - 1 sets - 20 reps - 10 hold  - Supine Quad Set on Towel Roll  - 1-2 x daily - 7 x weekly - 1 sets - 20 reps - 10 sec hold  - Sidelying Hip Abduction  - 1 x daily - 7 x weekly - 1-2 sets - 10-15 reps - 3 hold  - Bridge with Arms at Sides and Feet on Swiss Ball  - 1 x daily - 7 x weekly - 1 sets - 15-20 reps - 5s hold  - Side Stepping with Resistance at Thighs  - 1 x daily - 7 x weekly - 1 sets - 3 reps - 20-30 feet - right / left  - Forward Monster Walks  - 1 x daily - 7 x weekly - 1 sets - 1-3 reps - 20-30 feet  - Sit to Stand  - 1-2 x daily - 7 x weekly - 1-2 sets - 10-15 reps - 3sec hold  - Runner's Step Up/Down  - 1 x daily - 7 x weekly - 1-2 sets - 15-20 reps - 2-3 hold  - Lateral Step Up  - 1 x daily - 7 x weekly - 1 sets - 15-20 reps

## 2023-08-14 ENCOUNTER — TREATMENT (OUTPATIENT)
Dept: PHYSICAL THERAPY | Facility: CLINIC | Age: 68
End: 2023-08-14
Payer: MEDICARE

## 2023-08-14 DIAGNOSIS — M76.32 ILIOTIBIAL BAND TENDINITIS OF LEFT SIDE: ICD-10-CM

## 2023-08-14 DIAGNOSIS — M17.0 PRIMARY OSTEOARTHRITIS OF BOTH KNEES: Primary | ICD-10-CM

## 2023-08-14 PROCEDURE — 97530 THERAPEUTIC ACTIVITIES: CPT | Performed by: PHYSICAL THERAPIST

## 2023-08-14 PROCEDURE — 97110 THERAPEUTIC EXERCISES: CPT | Performed by: PHYSICAL THERAPIST

## 2023-08-14 NOTE — PROGRESS NOTES
Physical Therapy Daily Note    Patient Name: Cathy Gutiérrez         :  1955  Referring Physician: Ed Tony MD  Treatment Date: 2023  Date of Initial Visit: No linked episodes    Visit Diagnoses:    ICD-10-CM ICD-9-CM   1. Primary osteoarthritis of both knees  M17.0 715.16   2. Iliotibial band tendinitis of left side  M76.32 728.89       _____________________________________________________    Subjective   Cathy Gutiérrez reports: tape helpful -   Traveling to [place] for a wedding this week -     Objective   Tender patellar mobilizations painful (L) -     EXERCISES:   ISOMETRIC LEG PRESS into BALL;  20/5 sec ea (B); R-L  (TOO PAINFUL FOR (L) KNEE only )   SUPINE HS CURL w/ LEGS on BALL 20 w/ core emphasis  BRIDGE on heels 15/5 sec    SIDE STEP 30 ft each R/L  MONSTER WALK (Fwd/Retro) 30 ft ea  STEP UP- Hole 3 from bottom; 15 ea R/L  LAT STEP UP w/ ABD - Hole 3 from bottom: 15 ea R/L  SIT STAND x 15 (24 in off floor) w/o hands   [place] LEG PRESS (B) 90# x 20  NUSTEP Seat 9   L5  x 5 min  MATRIX HIP ADD 30# x 15  MATRIX HIP ABD  30# x 15  ADD:    SLS  BALANCE BOARD as austin      FUNCTIONAL ACTIVITIES:   TAPING / BRACING: K-tape to Unload (B) PF Jt x 2 layers to alleviate pain and allow improved gait / function and tolerance to PT activities and mm firing.SEE EXERCISE FLOW SHEET -      Assessment/Plan  69 yo female; (B) knee pain PF pain; Knee OA ;   Kinesio taping helpful in  reducing her knee pain and improving gait and tolerance to ADLs and PT activities - -   Pain w/ isolated (L) Isometric leg press      Progress strengthening /stabilization /functional activity- progress conservatively based on pain -      _________________________________________________     Manual Therapy:                 mins  54437;  Therapeutic Exercise:   20    mins  31815;     Neuromuscular Narda:        mins  06176;    Therapeutic Activity:     23      mins  02216;     Ultrasound:                          mins   89657;  Electrical Stimulation:         mins  27560 ( );  Dry Needling                       mins self-pay     Timed Treatment:   43   mins                  Total Treatment:     43   mins        Александр Chavarria PT  Physical Therapist  Lic # [id number]

## 2023-08-21 ENCOUNTER — TELEPHONE (OUTPATIENT)
Dept: PHYSICAL THERAPY | Facility: CLINIC | Age: 68
End: 2023-08-21

## 2023-08-24 ENCOUNTER — TREATMENT (OUTPATIENT)
Dept: PHYSICAL THERAPY | Facility: CLINIC | Age: 68
End: 2023-08-24
Payer: MEDICARE

## 2023-08-24 DIAGNOSIS — M17.0 PRIMARY OSTEOARTHRITIS OF BOTH KNEES: Primary | ICD-10-CM

## 2023-08-24 PROCEDURE — 97110 THERAPEUTIC EXERCISES: CPT | Performed by: PHYSICAL THERAPIST

## 2023-08-24 PROCEDURE — 97530 THERAPEUTIC ACTIVITIES: CPT | Performed by: PHYSICAL THERAPIST

## 2023-08-24 NOTE — PROGRESS NOTES
Physical Therapy Daily Note    Patient Name: Cathy Gutiérrez         :  1955  Referring Physician: Ed Tony MD  Treatment Date: 2023  Date of Initial Visit: No linked episodes    Visit Diagnoses:    ICD-10-CM ICD-9-CM   1. Primary osteoarthritis of both knees  M17.0 715.16       _____________________________________________________    Subjective   Cathy Gutiérrez reports: knees doing better - taping very helplful - did well in [place] -   Traveling to [place] tomorrow -   Has to carry her 20+# granddaughter up/down stairs all day when caring for her -   Wanted to leave early b/c granddaughter was upset she was leaving -     Objective   (R) knee pain w/ Matrix ABD, but improved if foot on farther step    EXERCISES:   ISOMETRIC LEG PRESS into BALL;  20/5 sec ea (B); R-L  (TOO PAINFUL FOR (L) KNEE only )   SUPINE HS CURL w/ LEGS on BALL 20 w/ core emphasis  BRIDGE on heels 15/5 sec    SIDE STEP 30 ft each R/L  MONSTER WALK (Fwd/Retro) 30 ft ea  STEP UP- Hole 3 from bottom; 15 ea R/L  LAT STEP UP w/ ABD - Hole 3 from bottom: 15 ea R/L  SIT STAND x 15 (24 in off floor) w/o hands   [place] LEG PRESS (B) 90# x 20  NUSTEP Seat 9   L5  x 5 min- (DEFER to try OCTANE - Resume next session)  MATRIX HIP ADD 25# x 20  MATRIX HIP ABD  20# x 15  (R) knee pain w/ Matrix ABD, but improved if foot on farther step  OCTANE: Seat 6 L1 x 3 min (DISCONTINUED DUE TO KNEE PAIN )  ADD:    SLS  BALANCE BOARD as austin      FUNCTIONAL ACTIVITIES:   TAPING / BRACING: K-tape to Unload (B) PF Jt to alleviate pain and allow improved gait / function and tolerance to PT activities and mm firing.  SEE EXERCISE FLOW SHEET -        Assessment/Plan  67 yo female; (B) knee pain PF pain; Knee OA ;   Kinesio taping helpful in  reducing her knee pain and improving gait and tolerance to ADLs and PT activities - -  Octane painful -     Progress strengthening /stabilization /functional activity - Resume NUSTEP instead of OCTANE -         _________________________________________________    Manual Therapy:                 mins  18889;  Therapeutic Exercise:    18    mins  40246;     Neuromuscular Narda:        mins  87803;    Therapeutic Activity:     20     mins  61605;     Ultrasound:                          mins  69012;  Iontophoresis:                     mins  29968;    Electrical Stimulation:         mins  15380 ( );  Mechanical Traction:          mins  52363  Dry Needling                       mins self-pay     Timed Treatment:   38    mins                  Total Treatment:     38    mins        Александр Chavarria PT  Physical Therapist  Lic # [id number]

## 2023-08-31 ENCOUNTER — TREATMENT (OUTPATIENT)
Dept: PHYSICAL THERAPY | Facility: CLINIC | Age: 68
End: 2023-08-31
Payer: MEDICARE

## 2023-08-31 DIAGNOSIS — M17.0 PRIMARY OSTEOARTHRITIS OF BOTH KNEES: Primary | ICD-10-CM

## 2023-08-31 PROCEDURE — 97530 THERAPEUTIC ACTIVITIES: CPT | Performed by: PHYSICAL THERAPIST

## 2023-08-31 PROCEDURE — 97110 THERAPEUTIC EXERCISES: CPT | Performed by: PHYSICAL THERAPIST

## 2023-08-31 NOTE — PROGRESS NOTES
Physical Therapy Daily Note    Patient Name: Cathy Gutiérrez         :  1955  Referring Physician: Ed Tony MD  Treatment Date: 2023  Date of Initial Visit: Type: THERAPY  Noted: 2023    Visit Diagnoses:  No diagnosis found.    _____________________________________________________    Subjective   Cathy Gutiérrez reports: left knee hurting more lately - Went to [place] last weekend -   No f/u appt w/ Dr. Tony - Increased LBP from [place] leg press and knee pain (L) w/ isometric leg press -     Objective   Pt ambulating w/o antalgic gait -     EXERCISES:   BRIDGES on HEELS 15/5 sec ea  SIDE STEP 40 ft each R/L Green band above knees  MONSTER WALK (Fwd/Retro) 40 ft ea Green band above knees  STEP UP- Hole 4 from bottom; 2x10 ea R/L  LAT STEP UP w/ ABD - Hole 4 from bottom: 15 ea R/L   SIT STAND x 20 (24 in off floor) w/o hands - toeing out w/ emphasis on glutes -   MATRIX LEG PRESS (B) 40# x 20   NUSTEP Seat 9   L4  x 8 min  MATRIX HIP ADD 30# x 20  MATRIX HIP ABD  25# x 20      ADD as tolerated - :    SLS  BALANCE BOARD as austin     FUNCTIONAL ACTIVITIES:   TAPING / BRACING: K-tape to Unload (B) PF Jt to alleviate pain and allow improved gait / function and tolerance to PT activities and mm firing.  SEE EXERCISE FLOW SHEET -      Assessment/Plan  67 yo female; (B) knee pain PF pain; Knee OA ;   Kinesio taping helpful in  reducing her knee pain and improving gait and tolerance to ADLs and PT activities - -but persistent knee pain Left recently -   Eileen may benefit from f/u appt w/ Dr. Tony.       Progress strengthening /stabilization /functional activity       _________________________________________________    Manual Therapy:                 mins  48039;  Therapeutic Exercise:    20    mins  40407;     Neuromuscular Narda:        mins  63638;    Therapeutic Activity:     20      mins  75720;     Ultrasound:                          mins  89691;  Iontophoresis:                     mins   96420;    Electrical Stimulation:         mins  48706 ( );  Mechanical Traction:          mins  02046  Dry Needling                       mins self-pay     Timed Treatment:   40    mins                  Total Treatment:     40    mins        Александр Chavarria PT  Physical Therapist  Lic # [id number]

## 2023-09-06 ENCOUNTER — TREATMENT (OUTPATIENT)
Dept: PHYSICAL THERAPY | Facility: CLINIC | Age: 68
End: 2023-09-06
Payer: MEDICARE

## 2023-09-06 DIAGNOSIS — M17.0 PRIMARY OSTEOARTHRITIS OF BOTH KNEES: Primary | ICD-10-CM

## 2023-09-06 PROCEDURE — 97112 NEUROMUSCULAR REEDUCATION: CPT | Performed by: PHYSICAL THERAPIST

## 2023-09-06 PROCEDURE — 97110 THERAPEUTIC EXERCISES: CPT | Performed by: PHYSICAL THERAPIST

## 2023-09-06 PROCEDURE — 97530 THERAPEUTIC ACTIVITIES: CPT | Performed by: PHYSICAL THERAPIST

## 2023-09-06 NOTE — PROGRESS NOTES
Physical Therapy Daily Note  RE-ASSESSMENT     Patient Name: Cathy Gutiérrez         :  1955  Referring Physician: Ed Tony MD  Treatment Date: 2023  Date of Initial Visit: Type: THERAPY  Noted: 2023    Visit Diagnoses:    ICD-10-CM ICD-9-CM   1. Primary osteoarthritis of both knees  M17.0 715.16       _____________________________________________________    Subjective   Cathy Gutiérrez reports: (R) knee hurts more since starting PT over the last couple of weeks - due to traveling/sitting or increased use -  (L) knee hurts less overall -    Feels quad and glutes working more -   (R) knee ant/sup knee pain all the time even at rest -   PAIN (R) KNEE at rest:  2/10      At worst: 8/10 (increased up/down stairs stairs (especially down), Has to carry heavy granddaughter up to third floor 2-3 days/week repeatedly -  (L) KNEE: At rest 0/10;     Worst 8-9 / 10 - (increased w/ squatting, stairs (especially down), kneeling , cannot get on floor, kneel)   Got an appointment to see DR. Tony  for tomorrow      Objective   Very tender along medial PF jt and infrapatellar region (B)  -   Painful step up and squat tests -     EXERCISES:   BRIDGES on HEELS 15/5 sec ea  SIDE STEP 40 ft each R/L Green band above knees  MONSTER WALK (Fwd/Retro) 40 ft ea Blue band above knees  STEP UP- Hole 4 from bottom; 2x10 ea R/L  LAT STEP UP w/ ABD - Hole 4 from bottom: 15 ea R/L   SIT STAND x 20 (22 in off floor) w/o hands - toeing out w/ emphasis on glutes -   MATRIX LEG PRESS (B) 40# x 20   NUSTEP Seat 9   L4  x 8 min -   MATRIX HIP ADD 30# x 20  MATRIX HIP ABD  25# x 20   SLS 2x 1 min each leg  BALANCE BOARD: Frnt/Bk;  Side/Side 2 min each      NMR:   Verbal and tactile cues to facilitate firing of quads, Glute med musculature statically and dynamically. Balance / proprioception activities - SEE EXERCISEs -        Functional / Therapeutic Activities:    TAPING / BRACING: K-Tape to unload PF jt (B) 2 over-lapping  layers and 1 to unload infrapatellar region -   SEE EXERCISE FLOW SHEET -   Discussed at length the importance of not doing stairs so much while carrying her granddaughter when she is able to go up the stairs herself as she may further injure her knees or worse fall down the steps injuring herself and her granddaughter -   Jt protection, ADL modification; Posture and      KOS:  45/80 x 100 = 56.2%    Assessment/Plan  67 yo female; (B) knee pain PF pain; Knee OA ;   Kinesio taping helpful in  reducing her knee pain and improving gait and tolerance to ADLs and PT activities - -but persistent knee pain Left recently -   Eileen may benefit from f/u appt w/ Dr. Tony.     Progress strengthening /stabilization /functional activity- to see Dr. Tony tomorrow -        _________________________________________________    Manual Therapy:                mins  69310;  Therapeutic Exercise:   15    mins  23124;     Neuromuscular Narda:  08     mins  18677;    Therapeutic Activity:     20      mins  86688;     Ultrasound:                         mins  44095;  Iontophoresis:                    mins  79327;    Electrical Stimulation:        mins  61935 ( );  Mechanical Traction:         mins  68220  Dry Needling                       mins self-pay     Timed Treatment:   43   mins                  Total Treatment:     43    mins        Александр Chavarria, PT  Physical Therapist  Lic # [id number]

## 2023-09-07 ENCOUNTER — OFFICE VISIT (OUTPATIENT)
Dept: ORTHOPEDIC SURGERY | Facility: CLINIC | Age: 68
End: 2023-09-07
Payer: MEDICARE

## 2023-09-07 VITALS — HEIGHT: 67 IN | BODY MASS INDEX: 23.9 KG/M2 | TEMPERATURE: 97.6 F | WEIGHT: 152.3 LBS

## 2023-09-07 DIAGNOSIS — M76.32 ILIOTIBIAL BAND TENDINITIS OF LEFT SIDE: ICD-10-CM

## 2023-09-07 DIAGNOSIS — M17.0 PRIMARY OSTEOARTHRITIS OF BOTH KNEES: Primary | ICD-10-CM

## 2023-09-07 RX ORDER — LIOTHYRONINE SODIUM 5 UG/1
2.5 TABLET ORAL DAILY
COMMUNITY
Start: 2023-08-25

## 2023-09-07 RX ORDER — CLONAZEPAM 0.5 MG/1
0.5 TABLET ORAL 2 TIMES DAILY PRN
COMMUNITY
Start: 2023-06-19

## 2023-09-07 RX ORDER — SOLIFENACIN SUCCINATE 10 MG/1
1 TABLET, FILM COATED ORAL DAILY
COMMUNITY
Start: 2023-08-01

## 2023-09-07 RX ORDER — LURASIDONE HYDROCHLORIDE 20 MG/1
TABLET, FILM COATED ORAL
COMMUNITY
Start: 2023-08-29

## 2023-09-07 RX ORDER — HYDROXYZINE HYDROCHLORIDE 10 MG/1
TABLET, FILM COATED ORAL
COMMUNITY
Start: 2023-08-15

## 2023-09-07 NOTE — PROGRESS NOTES
Patient: Cathy Gutiérrez  YOB: 1955 68 y.o. female  Medical Record Number: [id number]    Chief Complaints:   Chief Complaint   Patient presents with    Left Knee - Follow-up, Pain       History of Present Illness:Cathy Gutiérrez is a 68 y.o. female who presents for follow-up of left knee pain.  Patient been seen previously diagnosed with knee arthritis and possible meniscal injury.  She been doing quite well.  She has been doing some PT recently and has noticed some increased discomfort.  Similar symptoms on the right.  Describes the pain is more of an achiness.  Sometimes more laterally based consistent with likely some IT band involvement which had been noted previously.  She states things not too bad today but she feels that things get a lot better and then she may can overdo it.  She is not really been taking any anti-inflammatories at home.    Allergies:   Allergies   Allergen Reactions    Latex Rash       Medications:   Current Outpatient Medications   Medication Sig Dispense Refill    amphetamine-dextroamphetamine (ADDERALL) 10 MG tablet Take 1 tablet by mouth Daily. 30 tablet 0    Estradiol ([place] [facility]) Insert  into the vagina.      estradiol (VIVELLE-DOT) 0.1 MG/24HR patch Place 1 patch on the skin as directed by provider 2 (Two) Times a Week. 24 patch 2    hydrOXYzine (ATARAX) 10 MG tablet TAKE 1 TABLET BY MOUTH AT BEDTIME 1 TO 2 HOURS BEFORE SLEEP      lamoTRIgine  MG tablet sustained-release 24 hour Take 1 tablet by mouth 2 (two) times a day. 180 tablet 3    levothyroxine (SYNTHROID, LEVOTHROID) 112 MCG tablet Take 1 tablet by mouth Every Morning Before Breakfast. 90 tablet 3    liothyronine (CYTOMEL) 5 MCG tablet Take 0.5 tablets by mouth Daily.      LORazepam (ATIVAN) 0.5 MG tablet Take 1 tablet by mouth Daily As Needed for anxiety 30 tablet 5    lurasidone (LATUDA) 40 MG tablet tablet Take 1 tablet by mouth Daily.      Lurasidone HCl (LATUDA) 20 MG tablet tablet        "ondansetron (ZOFRAN) 8 MG tablet Take 1 tablet by mouth 3 (Three) Times a Day As Needed.      pantoprazole (PROTONIX) 40 MG EC tablet Take 1 tablet by mouth Daily. For further refill, pt needs to make appt. 90 tablet 0    solifenacin (VESICARE) 10 MG tablet Take 1 tablet by mouth Daily.      zolpidem (AMBIEN) 10 MG tablet Take 1 tablet by mouth At Night As Needed. 30 tablet 5    clonazePAM (KlonoPIN) 0.5 MG tablet Take 1 tablet by mouth 2 (Two) Times a Day As Needed. for anxiety (Patient not taking: Reported on 9/7/2023)      irbesartan-hydrochlorothiazide (AVALIDE) 150-12.5 MG tablet Take 2 tablets by mouth Daily for 30 days. 60 tablet 0     No current facility-administered medications for this visit.         The following portions of the patient's history were reviewed and updated as appropriate: allergies, current medications, past family history, past medical history, past social history, past surgical history and problem list.    Review of Systems:   A 14 point review of systems was performed. All systems negative except pertinent positives/negative listed in HPI above    Physical Exam:   Vitals:    09/07/23 1307   Temp: 97.6 °F (36.4 °C)   TempSrc: Temporal   Weight: 69.1 kg (152 lb 4.8 oz)   Height: 170.2 cm (67.01\")   PainSc:   7   PainLoc: Knee       General: A and O x 3, ASA, NAD    SCLERA:    Normal    DENTITION:   Normal    Left lower extremity examined no tenderness palpation about the joint or really along the IT band.  Range of motion is overall full and painless.  Motor and sensory intact distally.        Assessment/Plan:  Bilateral knee osteoarthritis, left IT band tendinitis    Recommend conservative treatment this time.  Patient states she does not feel she needs an injection she has had one that was previously in the left knee thought that helped but does not feel she is at that point today.  Recommend some over-the-counter anti-inflammatories, rest, ice, activity modification.  Things continue " to progress or do not improve may consider injection if warranted.  Patient understanding of this plan.  We will follow-up as needed.  All questions answered.

## 2023-09-11 ENCOUNTER — TREATMENT (OUTPATIENT)
Dept: PHYSICAL THERAPY | Facility: CLINIC | Age: 68
End: 2023-09-11
Payer: MEDICARE

## 2023-09-11 DIAGNOSIS — M17.0 PRIMARY OSTEOARTHRITIS OF BOTH KNEES: Primary | ICD-10-CM

## 2023-09-11 PROCEDURE — 97530 THERAPEUTIC ACTIVITIES: CPT | Performed by: PHYSICAL THERAPIST

## 2023-09-11 PROCEDURE — 97110 THERAPEUTIC EXERCISES: CPT | Performed by: PHYSICAL THERAPIST

## 2023-09-11 NOTE — PROGRESS NOTES
[facility] Physical Therapy Daily Note    Patient Name: Cathy Gutiérrez         :  1955  Referring Physician: Ed Tony MD  Treatment Date: 2023  Date of Initial Visit: Type: THERAPY  Noted: 2023    Visit Diagnoses:    ICD-10-CM ICD-9-CM   1. Primary osteoarthritis of both knees  M17.0 715.16       _____________________________________________________    Subjective   Cathy Gutiérrez reports: tape possibly too tight and made her (L) knee sore -   Did not have to take the baby up stairs as much as her mother was there for nap time -   Being sedentary due to not feeling well -   No pain in knees today - so far  Most limited getting down on to and up off the floor -   C/O (L) knee pain when attempting leg press and side stepping -     Objective   Tender around (B) PF jt and medial jt line - knees    EXERCISES:   BRIDGES on HEELS 15/5 sec ea  (STOPPED DUE TO C/O (L) LBP)   SIDE STEP 40 ft each R/L  - BLUE band above knees  MONSTER WALK (Fwd/Retro) 40 ft ea Blue band above knees  STEP UP- Hole 4 from bottom; 2x10 ea R/L  LAT STEP UP w/ ABD - Hole 4 from bottom: 15 ea R/L   SIT STAND x 20 (22 in off floor) w/o hands - toeing out w/ emphasis on glutes -   [place] LEG PRESS (B) 40# x 20  (STOPPED DUE TO INCREASED (L) KNEE PAIN)  NUSTEP Seat 9   L4  x 8 min -   MATRIX HIP ADD 30# x 30 (INCREASE WT Next session)  MATRIX HIP ABD  30# x 30  (INCREASE WT Next session)  SLS 2x 1 min each leg - DEFER DUE TO KNEE PAIN  BALANCE BOARD: Frnt/Bk;  Side/Side 2 min each  -- DEFER DUE TO KNEE PAIN     Functional / Therapeutic Activities:    TAPING / BRACING: K-Tape to Unload PF jt x 2 over-lapping layers (B) knees -   Discussed strategies to get on/off the floor with UE support -  SEE EXERCISE FLOW SHEET -       Assessment/Plan  67 yo female - (B) knee pain / OA -   Poor tolerance to PT today due to (L) knee pain even with taping -   May require further assessment / intervention due to persistent pain limiting tolerance  to PT -       Progress strengthening /stabilization /functional activity as pain allows -        _________________________________________________    Manual Therapy:                 mins  50515;  Therapeutic Exercise:    18    mins  42071;     Neuromuscular Narda:        mins  69915;    Therapeutic Activity:     20      mins  25237;     Ultrasound:                          mins  35912;  Iontophoresis:                     mins  33042;    Electrical Stimulation:         mins  03438 ( );  Mechanical Traction:          mins  01758  Dry Needling                       mins self-pay     Timed Treatment:   38    mins                  Total Treatment:     38    mins        Александр Chavarria, PT  Physical Therapist  Lic # [id number]

## 2023-09-19 ENCOUNTER — TREATMENT (OUTPATIENT)
Dept: PHYSICAL THERAPY | Facility: CLINIC | Age: 68
End: 2023-09-19
Payer: MEDICARE

## 2023-09-19 DIAGNOSIS — M17.0 PRIMARY OSTEOARTHRITIS OF BOTH KNEES: Primary | ICD-10-CM

## 2023-09-19 PROCEDURE — 97530 THERAPEUTIC ACTIVITIES: CPT | Performed by: PHYSICAL THERAPIST

## 2023-09-19 PROCEDURE — 97110 THERAPEUTIC EXERCISES: CPT | Performed by: PHYSICAL THERAPIST

## 2023-09-19 NOTE — PROGRESS NOTES
Physical Therapy Daily Note    Patient Name: Cathy Gutiérrez         :  1955  Referring Physician: Ed Tony MD  Treatment Date: 2023  Date of Initial Visit: Type: THERAPY  Noted: 2023  Visit Diagnoses:    ICD-10-CM ICD-9-CM   1. Primary osteoarthritis of both knees  M17.0 715.16       _____________________________________________________  Pt was late: '  Subjective   Cathy Gutiérrez reports: doing better w/o tape now - doing exercises at home and did well - side step may be too high at home and was painful -     Objective     EXERCISES:   BRIDGES on HEELS 15/5 sec ea    SIDE STEP 40 ft each R/L  - BLUE band above knees  DEFER  MONSTER WALK (Fwd/Retro) 40 ft ea Blue band above knees - DEFER  STEP UP- Hole 4 from bottom; 2x10 ea R/L  LAT STEP UP w/ ABD - Hole 4 from bottom: 15 ea R/L   SIT STAND x 20 (22 in off floor) w/o hands - toeing out w/ emphasis on glutes -   MATRIX LEG PRESS (B) 30# x 20  -   SANDRA HIP ADD  70# 2x15  SANDRA HIP ABD  45# 2x15   SLS 2x 1 min each leg - ADD NEXT  BALANCE BOARD: Frnt/Bk;  Side/Side 2 min each  -- ADD NEXT  NUSTEP Seat 9   L4  x 8 min      Functional / Therapeutic Activities:    TAPING / BRACING: K-Tape to Unload PF Jt (R) -   SEE EXERCISE FLOW SHEET - ]    Assessment/Plan  69 yo female - (B) knee pain / OA -   Tape helpful with (R) knee pain -   Overall improved tolerance to PT   Pt wouild benefit from more consistent HEP    Progress strengthening /stabilization /functional activity       _________________________________________________    Manual Therapy:                 mins  31839;  Therapeutic Exercise:    20    mins  15962;     Neuromuscular Narda:       mins  98772;    Therapeutic Activity:     13      mins  58305;     Ultrasound:                          mins  23290;  Iontophoresis:                     mins  35250;    Electrical Stimulation:         mins  05578 ( );  Mechanical Traction:          mins  40481  Dry Needling                        mins self-pay     Timed Treatment:   33    mins                  Total Treatment:     33  mins        Александр Chavarria, PT  Physical Therapist  Lic # [id number]

## 2023-09-26 ENCOUNTER — TREATMENT (OUTPATIENT)
Dept: PHYSICAL THERAPY | Facility: CLINIC | Age: 68
End: 2023-09-26
Payer: MEDICARE

## 2023-09-26 DIAGNOSIS — M17.0 PRIMARY OSTEOARTHRITIS OF BOTH KNEES: Primary | ICD-10-CM

## 2023-09-26 NOTE — PROGRESS NOTES
Physical Therapy Daily Note    Patient Name: Cathy Gutiérrez         :  1955  Referring Physician: Ed Tony MD  Treatment Date: 2023  Date of Initial Visit: Type: THERAPY  Noted: 2023    Visit Diagnoses:    ICD-10-CM ICD-9-CM   1. Primary osteoarthritis of both knees  M17.0 715.16       _____________________________________________________    Subjective   Cathy Gutiérrez reports: she is not feeling well - Pt requested to go home due to feeling ill -      Pt did not participate in PT today due to feeling sick -     NO TREATMENT -        _________________________________________________    NO CHARGES - NO PT - Pt left ill -   Continue as tolerated next session once Pt is feeling better -         Александр Chavarria, PT  Physical Therapist  Lic # [id number]

## 2023-09-28 ENCOUNTER — TREATMENT (OUTPATIENT)
Dept: PHYSICAL THERAPY | Facility: CLINIC | Age: 68
End: 2023-09-28
Payer: MEDICARE

## 2023-09-28 ENCOUNTER — OFFICE VISIT (OUTPATIENT)
Dept: ORTHOPEDIC SURGERY | Facility: CLINIC | Age: 68
End: 2023-09-28
Payer: MEDICARE

## 2023-09-28 ENCOUNTER — OFFICE VISIT (OUTPATIENT)
Dept: INTERNAL MEDICINE | Facility: CLINIC | Age: 68
End: 2023-09-28
Payer: MEDICARE

## 2023-09-28 VITALS — HEIGHT: 67 IN | BODY MASS INDEX: 23.12 KG/M2 | TEMPERATURE: 97.8 F | WEIGHT: 147.3 LBS

## 2023-09-28 VITALS
HEART RATE: 76 BPM | BODY MASS INDEX: 23.54 KG/M2 | SYSTOLIC BLOOD PRESSURE: 120 MMHG | HEIGHT: 67 IN | WEIGHT: 150 LBS | DIASTOLIC BLOOD PRESSURE: 66 MMHG

## 2023-09-28 DIAGNOSIS — E78.49 OTHER HYPERLIPIDEMIA: Chronic | ICD-10-CM

## 2023-09-28 DIAGNOSIS — F31.9 BIPOLAR 1 DISORDER: Chronic | ICD-10-CM

## 2023-09-28 DIAGNOSIS — M75.41 IMPINGEMENT SYNDROME OF RIGHT SHOULDER: ICD-10-CM

## 2023-09-28 DIAGNOSIS — M25.511 RIGHT SHOULDER PAIN, UNSPECIFIED CHRONICITY: Primary | ICD-10-CM

## 2023-09-28 DIAGNOSIS — C73 PAPILLARY THYROID CARCINOMA: ICD-10-CM

## 2023-09-28 DIAGNOSIS — R92.8 ABNORMAL MAMMOGRAM: Primary | ICD-10-CM

## 2023-09-28 DIAGNOSIS — I10 ESSENTIAL HYPERTENSION: Chronic | ICD-10-CM

## 2023-09-28 DIAGNOSIS — M85.88 OTHER SPECIFIED DISORDERS OF BONE DENSITY AND STRUCTURE, OTHER SITE: ICD-10-CM

## 2023-09-28 RX ORDER — MELOXICAM 15 MG/1
TABLET ORAL
Qty: 30 TABLET | Refills: 0 | Status: SHIPPED | OUTPATIENT
Start: 2023-09-28

## 2023-09-28 RX ORDER — METHYLPREDNISOLONE ACETATE 80 MG/ML
80 INJECTION, SUSPENSION INTRA-ARTICULAR; INTRALESIONAL; INTRAMUSCULAR; SOFT TISSUE
Status: COMPLETED | OUTPATIENT
Start: 2023-09-28 | End: 2023-09-28

## 2023-09-28 RX ORDER — LIDOCAINE HYDROCHLORIDE 10 MG/ML
2 INJECTION, SOLUTION EPIDURAL; INFILTRATION; INTRACAUDAL; PERINEURAL
Status: COMPLETED | OUTPATIENT
Start: 2023-09-28 | End: 2023-09-28

## 2023-09-28 RX ORDER — VIBEGRON 75 MG/1
75 TABLET, FILM COATED ORAL DAILY
COMMUNITY

## 2023-09-28 RX ORDER — IRBESARTAN AND HYDROCHLOROTHIAZIDE 300; 12.5 MG/1; MG/1
1 TABLET, FILM COATED ORAL DAILY
Qty: 90 TABLET | Refills: 1 | Status: SHIPPED | OUTPATIENT
Start: 2023-09-28

## 2023-09-28 RX ADMIN — LIDOCAINE HYDROCHLORIDE 2 ML: 10 INJECTION, SOLUTION EPIDURAL; INFILTRATION; INTRACAUDAL; PERINEURAL at 10:50

## 2023-09-28 RX ADMIN — METHYLPREDNISOLONE ACETATE 80 MG: 80 INJECTION, SUSPENSION INTRA-ARTICULAR; INTRALESIONAL; INTRAMUSCULAR; SOFT TISSUE at 10:50

## 2023-09-28 NOTE — PROGRESS NOTES
Physical Therapy Daily Note    Patient Name: Cathy Gutiérrez         :  1955  Referring Physician: Ed Tony MD  Treatment Date: 2023  Date of Initial Visit: No linked episodes    Visit Diagnoses:  No diagnosis found.    _____________________________________________________    Subjective   Cathy Gutiérrez reports: (R) knee hurting would like it taped - Saw Asya Samuels for her shoulder - she ordered PT -    Objective   Pt demonstrated improved awareness of glute w/ lateral step up w/ ABD -     EXERCISES:   BRIDGES on HEELS 15/5 sec ea    SIDE STEP 40 ft each R/L  - BLUE band above knees  DEFER  MONSTER WALK (Fwd/Retro) 40 ft ea Blue band above knees - DEFER  STEP UP- Hole 4 from bottom; 2x10 ea R/L  LAT STEP UP w/ ABD - Hole 4 from bottom: 15 ea R/L   SIT STAND x 20 (22 in off floor) w/o hands - toeing out w/ emphasis on glutes -   MATRIX LEG PRESS (B) 30# x 20  -   SANDRA HIP ADD  70# 2x15  SANDRA HIP ABD  45# 2x15   SLS 2x 1 min each leg - ADD NEXT  BALANCE BOARD: Frnt/Bk;  Side/Side 2 min each    NUSTEP Seat 9   L4  x 8 min     NMR  Verbal and tactile cues to facilitate  glute / quad  musculature statically and dynamically. -Balance / Proprioception activities       Functional / Therapeutic Activities:    TAPING / BRACING: K-Tape to Unload PF jt and infrapatellar region (R) knee  SEE EXERCISEs    Assessment/Plan  69 yo female - (B) knee pain / OA -   Tape helpful with (R) knee pain -   Overall improved tolerance to PT     Progress strengthening /stabilization /functional activity - taping prn -        _________________________________________________    Manual Therapy:                 mins  91726;  Therapeutic Exercise:    15    mins  18673;     Neuromuscular Narda:   05     mins  83555;    Therapeutic Activity:     20      mins  38686;     Ultrasound:                          mins  68668;  Iontophoresis:                     mins  23855;    Electrical Stimulation:         mins  95095 (MC  );  Mechanical Traction:          mins  47961  Dry Needling                       mins self-pay     Timed Treatment:   40    mins                  Total Treatment:     40    mins        Александр Chavarria PT  Physical Therapist  Lic # [id number]

## 2023-09-28 NOTE — PROGRESS NOTES
New Right  Shoulder      Patient: Cathy Gutiérrez        YOB: 1955    Medical Record Number: [id number]        Chief Complaints:   Right shoulder pain    History of Present Illness: This is a 68-year-old female who is right-hand dominant presents with right shoulder pain this been ongoing for 4 months no history injury change in activity she does tell me she has a granddaughter this a year and a half and she is keeping her and is having to lift her a lot.  I suspect this might be our source.  No prior history of similar symptoms symptoms are moderate intermittent aching worse with activity somewhat better with rest past medical history as listed below and reviewed by me      Allergies:   Allergies   Allergen Reactions    Latex Rash       Medications:   Home Medications:  Current Outpatient Medications on File Prior to Visit   Medication Sig    amphetamine-dextroamphetamine (ADDERALL) 10 MG tablet Take 1 tablet by mouth Daily.    Estradiol (IMVEXXY VA) Insert  into the vagina.    estradiol (VIVELLE-DOT) 0.1 MG/24HR patch Place 1 patch on the skin as directed by provider 2 (Two) Times a Week.    hydrOXYzine (ATARAX) 10 MG tablet TAKE 1 TABLET BY MOUTH AT BEDTIME 1 TO 2 HOURS BEFORE SLEEP    irbesartan-hydrochlorothiazide (AVALIDE) 150-12.5 MG tablet Take 2 tablets by mouth Daily for 30 days.    lamoTRIgine  MG tablet sustained-release 24 hour Take 1 tablet by mouth 2 (two) times a day.    levothyroxine (SYNTHROID, LEVOTHROID) 112 MCG tablet Take 1 tablet by mouth Every Morning Before Breakfast.    liothyronine (CYTOMEL) 5 MCG tablet Take 0.5 tablets by mouth Daily.    LORazepam (ATIVAN) 0.5 MG tablet Take 1 tablet by mouth Daily As Needed for anxiety    lurasidone (LATUDA) 40 MG tablet tablet Take 1 tablet by mouth Daily.    ondansetron (ZOFRAN) 8 MG tablet Take 1 tablet by mouth 3 (Three) Times a Day As Needed.    pantoprazole (PROTONIX) 40 MG EC tablet Take 1 tablet by mouth Daily. For  further refill, pt needs to make appt.    zolpidem (AMBIEN) 10 MG tablet Take 1 tablet by mouth At Night As Needed.    clonazePAM (KlonoPIN) 0.5 MG tablet Take 1 tablet by mouth 2 (Two) Times a Day As Needed. for anxiety (Patient not taking: Reported on 9/28/2023)    Lurasidone HCl (LATUDA) 20 MG tablet tablet  (Patient not taking: Reported on 9/28/2023)    solifenacin (VESICARE) 10 MG tablet Take 1 tablet by mouth Daily. (Patient not taking: Reported on 9/28/2023)     No current facility-administered medications on file prior to visit.     Current Medications:  Scheduled Meds:  Continuous Infusions:No current facility-administered medications for this visit.    PRN Meds:.    Past Medical History:   Diagnosis Date    Ankle sprain 1982    Arthritis     Arthritis of back 2019    Arthritis of neck 2017    Cancer     throid    Cervical disc disorder 2015    Depression     Disease of thyroid gland     Fracture of ankle 1982    Fracture, foot 2021    GERD (gastroesophageal reflux disease)     Hypertension     Knee swelling 2019    PONV (postoperative nausea and vomiting)     Sleep apnea     Tear of meniscus of knee 2016        Past Surgical History:   Procedure Laterality Date    BREAST BIOPSY      BUNIONECTOMY Bilateral     COLONOSCOPY  approx 2014    normal per pt    COLONOSCOPY N/A 02/02/2021    Procedure: COLONOSCOPY INTO CECUM;  Surgeon: Angelito Kelley MD;  Location: [facility] ENDOSCOPY;  Service: Gastroenterology;  Laterality: N/A;  PRE: PERSONAL H/O COLON POLYPS  POST: DIVERTICULOSIS, TOROUS COLON    DILATATION AND CURETTAGE      ELBOW PROCEDURE  2014    ENDOSCOPY N/A 09/06/2019    Procedure: ESOPHAGOGASTRODUODENOSCOPY with cold biopsies and balloon dilitation size 15, 16.5, 18;  Surgeon: Angelito Kelley MD;  Location: [facility] ENDOSCOPY;  Service: Gastroenterology    ENDOSCOPY N/A 03/12/2021    Procedure: ESOPHAGOGASTRODUODENOSCOPY WITH COLD BIOPSIES;  Surgeon: Angelito Kelley MD;  Location: [facility]  "ENDOSCOPY;  Service: Gastroenterology;  Laterality: N/A;  PRE: STORY'S ESOPHAGUS, REFLUX, DYSPHAGIA  POST: MILD ESOPHAGITIS, GASTRITIS, DUODENITIS    EPIDURAL BLOCK      HAND SURGERY  2004    HYSTERECTOMY      THYROID SURGERY      TRIGGER POINT INJECTION  2018    UPPER GASTROINTESTINAL ENDOSCOPY  approx 2014    pt doesn't recall         Social History     Occupational History    Not on file   Tobacco Use    Smoking status: Never    Smokeless tobacco: Never   Vaping Use    Vaping Use: Never used   Substance and Sexual Activity    Alcohol use: Not Currently     Comment: 5+ drinks/night    Drug use: No    Sexual activity: Not Currently     Partners: Male     Birth control/protection: Post-menopausal      Social History     Social History Narrative    Not on file        Family History   Problem Relation Age of Onset    Breast cancer Maternal Grandmother     Breast cancer Paternal Grandmother     Breast cancer Maternal Aunt     Breast cancer Paternal Aunt     Alcohol abuse Mother 43    Heart disease Father     Hyperlipidemia Father     Diabetes Father     Parkinsonism Brother         bulbar supranuclear palsy    Diabetes Brother              Review of Systems:     Review of Systems      Physical Exam: 68 y.o. female  General Appearance:    Alert, cooperative, in no acute distress                   Vitals:    09/28/23 1030   Temp: 97.8 °F (36.6 °C)   Weight: 66.8 kg (147 lb 4.8 oz)   Height: 170.2 cm (67\")   PainSc:   6      Patient is alert and read ×3 no acute distress appears her above-listed at height weight and age.  Affect is normal respiratory rate is normal unlabored. Heart rate regular rate rhythm, sclera, dentition and hearing are normal for the purpose of this exam.    Ortho Exam  Physical exam of the right shoulder reveals no overlying skin changes no lymphedema no lymphadenopathy.  Patient has active flexion 180 with mild symptoms abduction is similar external rotation is to 50 and internal rotation to the " upper lumbar spine with mild symptoms.  Patient has good rotator cuff strength 4+ over 5 with isometric strength testing with pain.  Patient has a positive impingement and a positive Pérez sign.  Patient has good cervical range of motion which is full and asymptomatic no radicular symptoms.  Patient has a normal elbow exam.  Good distal pulses are present  Patient has pain with overhead activity and a positive Neer sign and a positive empty can sign , a positive drop arm and a definitive painful arc   She does have a slightly rounded shoulder posture  Large Joint Arthrocentesis: R subacromial bursa  Date/Time: 9/28/2023 10:50 AM  Consent given by: patient  Site marked: site marked  Timeout: Immediately prior to procedure a time out was called to verify the correct patient, procedure, equipment, support staff and site/side marked as required   Supporting Documentation  Indications: pain   Procedure Details  Location: shoulder - R subacromial bursa  Preparation: Patient was prepped and draped in the usual sterile fashion  Needle gauge: 21G.  Approach: posterior  Medications administered: 80 mg methylPREDNISolone acetate 80 MG/ML; 2 mL lidocaine PF 1% 1 %  Patient tolerance: patient tolerated the procedure well with no immediate complications            Radiology:   AP, Scapular Y and Axillary Lateral of the right shoulder were ordered/reviewed to evauate shoulder pain.  I have no comparative films she has some acromioclavicular arthritis as well as some sclerosis at the insertion of the cup no acute pathology  Imaging Results (Most Recent)       Procedure Component Value Units Date/Time    XR Shoulder 2+ View Right [[id number]] Resulted: 09/28/23 1023     Updated: 09/28/23 1024    Impression:      Ordering physician's impression is located in the Encounter Note dated 09/28/23. X-ray performed in the DR room.            Assessment/Plan: Right shoulder pain I think this is rotator cuff in some fashion hopefully just  impingement plan is to proceed with an injection as a diagnostic and therapeutic tool we will have her see physical therapy as well.  If she fails to improve with this would pursue other means of testing  Cortisone Injection. See procedure note.  Cortisone Injection for DIAGNOSTIC and THERAPUTIC purposes.

## 2023-09-28 NOTE — PROGRESS NOTES
"Chief Complaint  Hypertension    Subjective        Cathy Gutiérrez presents to [facility]  History of Present Illness Has been following with Dr. Lawson for irritable bladder issues- added Gemtessa and hydroxyzine- seems to be helpful.   Also seeing Dr. Rucker about her knees.   Depression is better- taking care of her granddaughter 3x week which helps.    She is taking semaglutide- from a clinic- uncertain dose.     Objective   Vital Signs:  /66   Pulse 76   Ht 170.2 cm (67\")   Wt 68 kg (150 lb)   BMI 23.49 kg/m²   Estimated body mass index is 23.49 kg/m² as calculated from the following:    Height as of this encounter: 170.2 cm (67\").    Weight as of this encounter: 68 kg (150 lb).       BMI is within normal parameters. No other follow-up for BMI required.      Physical Exam  Constitutional:       Appearance: Normal appearance.   Cardiovascular:      Rate and Rhythm: Normal rate.   Pulmonary:      Effort: Pulmonary effort is normal.   Musculoskeletal:      Right lower leg: No edema.      Left lower leg: No edema.      Result Review :                   Assessment and Plan   Diagnoses and all orders for this visit:    1. Abnormal mammogram (Primary)  Comments:  ordered u/s and diagnostic mmg  Orders:  -     Mammo Diagnostic Digital Tomosynthesis Left With CAD; Future  -     US Breast Left Complete; Future    2. Papillary thyroid carcinoma  Comments:  TSH at goal    3. Other hyperlipidemia  Comments:  rechec at f/u  Orders:  -     Comprehensive Metabolic Panel; Future  -     Lipid Panel With / Chol / HDL Ratio; Future    4. Other specified disorders of bone density and structure, other site  Comments:  due for dexa scan  Orders:  -     DEXA Bone Density Axial    5. Essential hypertension  Comments:  Controlled, no [email or web address]  Orders:  -     irbesartan-hydrochlorothiazide (AVALIDE) 300-12.5 MG tablet; Take 1 tablet by mouth Daily.  Dispense: 90 tablet; Refill: 1    6. Bipolar 1 " disorder  Comments:  seems better- zhang with alcohol abuse in remission.             Follow Up   Return in about 6 months (around 3/28/2024) for Medicare Wellness, Lab Before FUP.  Patient was given instructions and counseling regarding her condition or for health maintenance advice. Please see specific information pulled into the AVS if appropriate.

## 2023-10-03 ENCOUNTER — TREATMENT (OUTPATIENT)
Dept: PHYSICAL THERAPY | Facility: CLINIC | Age: 68
End: 2023-10-03
Payer: MEDICARE

## 2023-10-03 NOTE — PROGRESS NOTES
Physical Therapy Daily Note    Patient Name: Cathy Gutiérrez         :  1955  Referring Physician: Ed Tony MD  Treatment Date: 10/3/2023  Date of Initial Visit: Type: THERAPY  Noted: 2023    Visit Diagnoses:  No diagnosis found.    _____________________________________________________    Subjective   Cathy Gutiérrez reports: decreased (L) knee pain - Persistent (R) knee pain - tape helpful     Objective   Tender infrapatellar region (R)     EXERCISES:   ISOMETRIC LEG PRESS w/ FEET ON BALL 20/5 sec ea  BRIDGES on HEELS 15/5 sec ea    SIDE STEP 40 ft each R/L  - BLUE band above knees -DEFER  MONSTER WALK (Fwd/Retro) 40 ft ea Blue band above knees - -DEFER  STEP UP- Hole 4 from bottom; 2x10 ea R/L  LAT STEP UP w/ ABD - Hole 4 from bottom: 15 ea R/L   SIT STAND x 20 (22 in off floor) w/o hands - toeing out w/ emphasis on glutes -   MATRIX LEG PRESS (B) 30# x 20  - -DEFER  MATRIX HIP ADD  35# 2x15  MATRIX HIP ABD  20# 2x15   (INCREASE Wt if (R) knee not hurting) -  SLS 2x 1 min each leg -   BALANCE BOARD: Frnt/Bk;  Side/Side 2 min each    NUSTEP Seat 9   L4  x 8 min  -DEFER     NMR  Verbal and tactile cues to facilitate  glute / quad  musculature statically and dynamically. -Balance / Proprioception activities     Functional / Therapeutic Activities:    TAPING / BRACING: K-Tape to Unload PF jt - 2) Unload infrapatellar region (R) knee   SEE EXERCISE FLOW SHEET -   Jt protection, ADL modification; Posture and      Assessment/Plan  69 yo female - (B) knee pain / OA -   Tape helpful with (R) knee pain -   Overall improved tolerance to PT   Improved tolerance to step up (fwd/lat), but pain w/ attempting hip abduction -     Progress strengthening /stabilization /functional activity       _________________________________________________    Manual Therapy:                 mins  80213;  Therapeutic Exercise:    18    mins  28785;     Neuromuscular Narda:   05     mins  60507;    Therapeutic  Activity:     17      mins  63704;     Ultrasound:                          mins  26623;  Iontophoresis:                     mins  70474;    Electrical Stimulation:         mins  88616 ( );     Timed Treatment:   40    mins                  Total Treatment:     40    mins        Александр Chavarria PT  Physical Therapist  Lic # [id number]

## 2023-10-05 ENCOUNTER — TELEPHONE (OUTPATIENT)
Dept: ORTHOPEDICS | Facility: OTHER | Age: 68
End: 2023-10-05
Payer: MEDICARE

## 2023-10-31 NOTE — PROGRESS NOTES
Patient: Cathy Gutiérrez  YOB: 1955  Date of Service: 10/31/2023    Chief Complaints: Right shoulder pain    Subjective:    History of Present Illness: Pt is seen in the office today with complaints of right shoulder pain I last saw her we thought it was more impingement we treated with injections and strengthening she states she has no pain at all she is happy with where she is she is progressing her activities        Allergies:   Allergies   Allergen Reactions    Latex Rash       Medications:   Home Medications:  Current Outpatient Medications on File Prior to Visit   Medication Sig    amphetamine-dextroamphetamine (ADDERALL) 10 MG tablet Take 1 tablet by mouth Daily.    Estradiol (IMVEXXY VA) Insert  into the vagina.    estradiol (VIVELLE-DOT) 0.1 MG/24HR patch Place 1 patch on the skin as directed by provider 2 (Two) Times a Week.    hydrOXYzine (ATARAX) 10 MG tablet TAKE 1 TABLET BY MOUTH AT BEDTIME 1 TO 2 HOURS BEFORE SLEEP    irbesartan-hydrochlorothiazide (AVALIDE) 300-12.5 MG tablet Take 1 tablet by mouth Daily.    lamoTRIgine  MG tablet sustained-release 24 hour Take 1 tablet by mouth 2 (two) times a day.    levothyroxine (SYNTHROID, LEVOTHROID) 112 MCG tablet Take 1 tablet by mouth Every Morning Before Breakfast.    liothyronine (CYTOMEL) 5 MCG tablet Take 0.5 tablets by mouth Daily.    LORazepam (ATIVAN) 0.5 MG tablet Take 1 tablet by mouth Daily As Needed for anxiety    lurasidone (LATUDA) 40 MG tablet tablet Take 1 tablet by mouth Daily.    meloxicam (MOBIC) 15 MG tablet 1 PO Daily with food.    ondansetron (ZOFRAN) 8 MG tablet Take 1 tablet by mouth 3 (Three) Times a Day As Needed.    pantoprazole (PROTONIX) 40 MG EC tablet Take 1 tablet by mouth Daily. For further refill, pt needs to make appt.    Vibegron (Gemtesa) 75 MG tablet Take 1 tablet by mouth Daily. Indications: Overactive Bladder    zolpidem (AMBIEN) 10 MG tablet Take 1 tablet by mouth At Night As Needed.     No  current facility-administered medications on file prior to visit.     Current Medications:  Scheduled Meds:  Continuous Infusions:No current facility-administered medications for this visit.    PRN Meds:.    I have reviewed the patient's medical history in detail and updated the computerized patient record.  Review and summarization of old records include:    Past Medical History:   Diagnosis Date    Ankle sprain 1982    Arthritis     Arthritis of back 2019    Arthritis of neck 2017    Cancer     throid    Cervical disc disorder 2015    Depression     Disease of thyroid gland     Fracture of ankle 1982    Fracture, foot 2021    GERD (gastroesophageal reflux disease)     Hypertension     Knee swelling 2019    PONV (postoperative nausea and vomiting)     Sleep apnea     Tear of meniscus of knee 2016        Past Surgical History:   Procedure Laterality Date    BREAST BIOPSY      BUNIONECTOMY Bilateral     COLONOSCOPY  approx 2014    normal per pt    COLONOSCOPY N/A 02/02/2021    Procedure: COLONOSCOPY INTO CECUM;  Surgeon: Angelito Kelley MD;  Location: [facility] ENDOSCOPY;  Service: Gastroenterology;  Laterality: N/A;  PRE: PERSONAL H/O COLON POLYPS  POST: DIVERTICULOSIS, TOROUS COLON    DILATATION AND CURETTAGE      ELBOW PROCEDURE  2014    ENDOSCOPY N/A 09/06/2019    Procedure: ESOPHAGOGASTRODUODENOSCOPY with cold biopsies and balloon dilitation size 15, 16.5, 18;  Surgeon: Angelito Kelley MD;  Location: [facility] ENDOSCOPY;  Service: Gastroenterology    ENDOSCOPY N/A 03/12/2021    Procedure: ESOPHAGOGASTRODUODENOSCOPY WITH COLD BIOPSIES;  Surgeon: Angelito Kelley MD;  Location: [facility] ENDOSCOPY;  Service: Gastroenterology;  Laterality: N/A;  PRE: STORY'S ESOPHAGUS, REFLUX, DYSPHAGIA  POST: MILD ESOPHAGITIS, GASTRITIS, DUODENITIS    EPIDURAL BLOCK      HAND SURGERY  2004    HYSTERECTOMY      THYROID SURGERY      TRIGGER POINT INJECTION  2018    UPPER GASTROINTESTINAL ENDOSCOPY  approx 2014    pt  doesn't recall         Social History     Occupational History    Not on file   Tobacco Use    Smoking status: Never    Smokeless tobacco: Never   Vaping Use    Vaping Use: Never used   Substance and Sexual Activity    Alcohol use: Not Currently     Comment: 5+ drinks/night    Drug use: No    Sexual activity: Not Currently     Partners: Male     Birth control/protection: Post-menopausal      Social History     Social History Narrative    Not on file        Family History   Problem Relation Age of Onset    Breast cancer Maternal Grandmother     Breast cancer Paternal Grandmother     Breast cancer Maternal Aunt     Breast cancer Paternal Aunt     Alcohol abuse Mother 43    Heart disease Father     Hyperlipidemia Father     Diabetes Father     Parkinsonism Brother         bulbar supranuclear palsy    Diabetes Brother        ROS: 14 point review of systems was performed and was negative except for documented findings in HPI and today's encounter.     Allergies:   Allergies   Allergen Reactions    Latex Rash     Constitutional:  Denies fever, shaking or chills   Eyes:  Denies change in visual acuity   HENT:  Denies nasal congestion or sore throat   Respiratory:  Denies cough or shortness of breath   Cardiovascular:  Denies chest pain or severe LE edema   GI:  Denies abdominal pain, nausea, vomiting, bloody stools or diarrhea   Musculoskeletal:  Numbness, tingling, or loss of motor function only as noted above in history of present illness.  : Denies painful urination or hematuria  Integument:  Denies rash, lesion or ulceration   Neurologic:  Denies headache or focal weakness  Endocrine:  Denies lymphadenopathy  Psych:  Denies confusion or change in mental status   Hem:  Denies active bleeding      Physical Exam: 68 y.o. female  Wt Readings from Last 3 Encounters:   10/02/23 66.7 kg (147 lb)   09/28/23 68 kg (150 lb)   09/28/23 66.8 kg (147 lb 4.8 oz)       There is no height or weight on file to calculate BMI.    There  were no vitals filed for this visit.  Vital signs reviewed.   General Appearance:    Alert, cooperative, in no acute distress                    Ortho exam      Physical exam of the right shoulder reveals no overlying skin changes no lymphedema no lymphadenopathy.  The patient can actively flex to 180, abduction is similar external rotation is to 50, internal rotation to the upper lumbar spine.  Rotator cuff strength is 4+ to 5 over 5 with isometric strength testing without symptoms.  The patient has good cervical range of motion full and asymptomatic no radicular symptoms and a normal elbow exam.  Patient has good distal pulses            Assessment: Right shoulder pain that we thought was impingement she is responded well to conservative management she has no pain at all she can continue progress her activities and follow-up as needed    Plan: Is as above  Follow up as indicated.  Ice, elevate, and rest as needed.    Asya Samuels M.D.

## 2023-11-02 ENCOUNTER — OFFICE VISIT (OUTPATIENT)
Dept: ORTHOPEDIC SURGERY | Facility: CLINIC | Age: 68
End: 2023-11-02
Payer: MEDICARE

## 2023-11-02 VITALS — WEIGHT: 143.6 LBS | HEIGHT: 67 IN | TEMPERATURE: 98.2 F | BODY MASS INDEX: 22.54 KG/M2

## 2023-11-02 DIAGNOSIS — M75.41 IMPINGEMENT SYNDROME OF RIGHT SHOULDER: Primary | ICD-10-CM

## 2023-11-02 PROCEDURE — 99212 OFFICE O/P EST SF 10 MIN: CPT | Performed by: ORTHOPAEDIC SURGERY

## 2023-11-02 PROCEDURE — 1159F MED LIST DOCD IN RCRD: CPT | Performed by: ORTHOPAEDIC SURGERY

## 2023-11-02 PROCEDURE — 1160F RVW MEDS BY RX/DR IN RCRD: CPT | Performed by: ORTHOPAEDIC SURGERY

## 2023-11-02 RX ORDER — DOXYCYCLINE HYCLATE 100 MG/1
CAPSULE ORAL
COMMUNITY
Start: 2023-10-31

## 2023-11-21 ENCOUNTER — HOSPITAL ENCOUNTER (OUTPATIENT)
Dept: MAMMOGRAPHY | Facility: HOSPITAL | Age: 68
Discharge: HOME | End: 2023-11-21
Admitting: INTERNAL MEDICINE
Payer: MEDICARE

## 2023-11-21 ENCOUNTER — HOSPITAL ENCOUNTER (OUTPATIENT)
Dept: ULTRASOUND IMAGING | Facility: HOSPITAL | Age: 68
Discharge: HOME | End: 2023-11-21
Payer: MEDICARE

## 2023-11-21 DIAGNOSIS — R92.8 ABNORMAL MAMMOGRAM: ICD-10-CM

## 2023-11-21 PROCEDURE — 77065 DX MAMMO INCL CAD UNI: CPT

## 2023-11-21 PROCEDURE — G0279 TOMOSYNTHESIS, MAMMO: HCPCS

## 2023-11-21 PROCEDURE — 76642 ULTRASOUND BREAST LIMITED: CPT

## 2024-01-03 ENCOUNTER — TELEPHONE (OUTPATIENT)
Dept: GASTROENTEROLOGY | Facility: CLINIC | Age: 69
End: 2024-01-03
Payer: MEDICARE

## 2024-01-03 NOTE — TELEPHONE ENCOUNTER
LAST C/S 2/2/21 IN EPIC     PERSONAL HX OF POLYPS     FAMILY HX OF POLYPS    NO FAMILY HX OF COLON CA    NO ASA OR BLOOD THINNERS        LIST OF  MEDICATIONS  LATUDA  CYTOMEL  ADDER ALL  LAMICTAL  ESTRADIOL PATCH  SYNTHROID  PROTONIX  AMBIEN   ATIVAN  IMVEXXY  GEMTESSA            OA QUESTIONNAIRE SCANNED IN MEDIA

## 2024-01-10 DIAGNOSIS — I10 ESSENTIAL HYPERTENSION: Chronic | ICD-10-CM

## 2024-01-10 RX ORDER — IRBESARTAN AND HYDROCHLOROTHIAZIDE 300; 12.5 MG/1; MG/1
1 TABLET, FILM COATED ORAL DAILY
Qty: 90 TABLET | Refills: 1 | Status: SHIPPED | OUTPATIENT
Start: 2024-01-10

## 2024-01-10 RX ORDER — ESTRADIOL 0.1 MG/D
1 FILM, EXTENDED RELEASE TRANSDERMAL 2 TIMES WEEKLY
Qty: 24 PATCH | Refills: 0 | Status: SHIPPED | OUTPATIENT
Start: 2024-01-11

## 2024-02-15 RX ORDER — ESTRADIOL 0.1 MG/D
1 FILM, EXTENDED RELEASE TRANSDERMAL 2 TIMES WEEKLY
Qty: 24 PATCH | Refills: 0 | Status: SHIPPED | OUTPATIENT
Start: 2024-02-15

## 2024-04-01 ENCOUNTER — OFFICE VISIT (OUTPATIENT)
Dept: INTERNAL MEDICINE | Facility: CLINIC | Age: 69
End: 2024-04-01
Payer: MEDICARE

## 2024-04-01 VITALS
HEIGHT: 67 IN | HEART RATE: 78 BPM | SYSTOLIC BLOOD PRESSURE: 132 MMHG | WEIGHT: 147 LBS | BODY MASS INDEX: 23.07 KG/M2 | DIASTOLIC BLOOD PRESSURE: 72 MMHG

## 2024-04-01 DIAGNOSIS — R29.898 DECREASED ROM OF NECK: Primary | ICD-10-CM

## 2024-04-01 DIAGNOSIS — C73 PAPILLARY THYROID CARCINOMA: Chronic | ICD-10-CM

## 2024-04-01 DIAGNOSIS — I10 ESSENTIAL HYPERTENSION: Chronic | ICD-10-CM

## 2024-04-01 DIAGNOSIS — Z23 NEED FOR PNEUMOCOCCAL 20-VALENT CONJUGATE VACCINATION: ICD-10-CM

## 2024-04-01 DIAGNOSIS — Z00.00 MEDICARE ANNUAL WELLNESS VISIT, SUBSEQUENT: ICD-10-CM

## 2024-04-01 DIAGNOSIS — R74.01 ELEVATED ALT MEASUREMENT: ICD-10-CM

## 2024-04-01 DIAGNOSIS — R92.8 ABNORMAL MAMMOGRAM: Chronic | ICD-10-CM

## 2024-04-01 RX ORDER — BUPROPION HYDROCHLORIDE 100 MG/1
TABLET ORAL
COMMUNITY
Start: 2024-01-22

## 2024-04-01 NOTE — PROGRESS NOTES
The ABCs of the Annual Wellness Visit  Subsequent Medicare Wellness Visit    Akira Gutiérrez is a 68 y.o. female who presents for a Subsequent Medicare Wellness Visit.    The following portions of the patient's history were reviewed and   updated as appropriate: allergies, current medications, past family history, past medical history, past social history, past surgical history, and problem list.    Compared to one year ago, the patient feels her physical   health is the same.    Compared to one year ago, the patient feels her mental   health is the same.    Recent Hospitalizations:  She was not admitted to the hospital during the last year.       Current Medical Providers:  Patient Care Team:  Deborah Rodgers MD as PCP - General (Internal Medicine)  Royer Jones MD (Psychiatry)  Myrtle Anthony MD as Consulting Physician (Endocrinology)    Outpatient Medications Prior to Visit   Medication Sig Dispense Refill    amphetamine-dextroamphetamine (ADDERALL) 10 MG tablet Take 1 tablet by mouth Daily. 30 tablet 0    buPROPion (WELLBUTRIN) 100 MG tablet       estradiol (VIVELLE-DOT) 0.1 MG/24HR patch Place 1 patch on the skin as directed by provider 2 (Two) Times a Week. 24 patch 0    hydrOXYzine (ATARAX) 10 MG tablet TAKE 1 TABLET BY MOUTH AT BEDTIME 1 TO 2 HOURS BEFORE SLEEP      irbesartan-hydrochlorothiazide (AVALIDE) 300-12.5 MG tablet Take 1 tablet by mouth Daily. 90 tablet 1    lamoTRIgine  MG tablet sustained-release 24 hour Take 1 tablet by mouth 2 (two) times a day. 180 tablet 3    levothyroxine (SYNTHROID, LEVOTHROID) 112 MCG tablet Take 1 tablet by mouth Every Morning Before Breakfast. 90 tablet 3    liothyronine (CYTOMEL) 5 MCG tablet Take 0.5 tablets by mouth Daily.      LORazepam (ATIVAN) 0.5 MG tablet Take 1 tablet by mouth Daily As Needed for anxiety 30 tablet 5    lurasidone (LATUDA) 40 MG tablet tablet Take 1 tablet by mouth Daily.      meloxicam (MOBIC) 15 MG tablet 1 PO Daily  "with food. 30 tablet 0    ondansetron (ZOFRAN) 8 MG tablet Take 1 tablet by mouth 3 (Three) Times a Day As Needed.      pantoprazole (PROTONIX) 40 MG EC tablet Take 1 tablet by mouth Daily. For further refill, pt needs to make appt. 90 tablet 0    Vibegron (Gemtesa) 75 MG tablet Take 1 tablet by mouth Daily. Indications: Overactive Bladder      zolpidem (AMBIEN) 10 MG tablet Take 1 tablet by mouth At Night As Needed. 30 tablet 5    Estradiol 10 MCG insert Insert 1 Insert into the vagina 2 (Two) Times a Week.      doxycycline (VIBRAMYCIN) 100 MG capsule  (Patient not taking: Reported on 4/1/2024)       No facility-administered medications prior to visit.       No opioid medication identified on active medication list. I have reviewed chart for other potential  high risk medication/s and harmful drug interactions in the elderly.        Aspirin is not on active medication list.  Aspirin use is not indicated based on review of current medical condition/s. Risk of harm outweighs potential benefits.  .    Patient Active Problem List   Diagnosis    Vitamin D deficiency    Obstructive sleep apnea, adult    Gastroesophageal reflux disease    Lung nodule < 6cm on CT    Papillary thyroid carcinoma    Essential hypertension    Bipolar 1 disorder    Polyp of colon    Arellano's esophagus without dysplasia     Advance Care Planning   Advance Care Planning     Advance Directive is not on file.  ACP discussion was held with the patient during this visit. Patient has an advance directive (not in EMR), copy requested.     Objective    Vitals:    04/01/24 1002   BP: 132/72   Pulse: 78   Weight: 66.7 kg (147 lb)   Height: 170.2 cm (67\")     Estimated body mass index is 23.02 kg/m² as calculated from the following:    Height as of this encounter: 170.2 cm (67\").    Weight as of this encounter: 66.7 kg (147 lb).    BMI is within normal parameters. No other follow-up for BMI required.      Does the patient have evidence of cognitive " impairment? No    Lab Results   Component Value Date    CHLPL 263 (H) 2024    TRIG 187 (H) 2024    HDL 67 (H) 2024     (H) 2024    VLDL 34 2024        HEALTH RISK ASSESSMENT    Smoking Status:  Social History     Tobacco Use   Smoking Status Never   Smokeless Tobacco Never     Alcohol Consumption:  Social History     Substance and Sexual Activity   Alcohol Use Not Currently    Comment: 5+ drinks/night     Fall Risk Screen:    ZAFAR Fall Risk Assessment was completed, and patient is at LOW risk for falls.Assessment completed on:2024    Depression Screenin/1/2024    10:05 AM   PHQ-2/PHQ-9 Depression Screening   Little Interest or Pleasure in Doing Things 0-->not at all   Feeling Down, Depressed or Hopeless 0-->not at all   PHQ-9: Brief Depression Severity Measure Score 0       Health Habits and Functional and Cognitive Screenin/1/2024    10:04 AM   Functional & Cognitive Status   Do you have difficulty preparing food and eating? No   Do you have difficulty bathing yourself, getting dressed or grooming yourself? No   Do you have difficulty using the toilet? No   Do you have difficulty moving around from place to place? No   Do you have trouble with steps or getting out of a bed or a chair? No   Current Diet Well Balanced Diet   Dental Exam Up to date   Eye Exam Up to date   Exercise (times per week) 0 times per week   Current Exercises Include No Regular Exercise   Do you need help using the phone?  No   Are you deaf or do you have serious difficulty hearing?  No   Do you need help to go to places out of walking distance? No   Do you need help shopping? No   Do you need help preparing meals?  No   Do you need help with housework?  No   Do you need help with laundry? No   Do you need help taking your medications? No   Do you need help managing money? No   Do you ever drive or ride in a car without wearing a seat belt? No   Have you felt unusual stress, anger or  loneliness in the last month? No   Who do you live with? Spouse   If you need help, do you have trouble finding someone available to you? No   Have you been bothered in the last four weeks by sexual problems? No   Do you have difficulty concentrating, remembering or making decisions? No       Age-appropriate Screening Schedule:  Refer to the list below for future screening recommendations based on patient's age, sex and/or medical conditions. Orders for these recommended tests are listed in the plan section. The patient has been provided with a written plan.    Health Maintenance   Topic Date Due    RSV Vaccine - Adults (1 - 1-dose 60+ series) Never done    DXA SCAN  03/24/2023    COVID-19 Vaccine (4 - 2023-24 season) 09/01/2023    PAP SMEAR  02/25/2024    GASTROSCOPY (EGD)  03/12/2024    ANNUAL WELLNESS VISIT  03/15/2024    INFLUENZA VACCINE  08/01/2024    LIPID PANEL  03/27/2025    MAMMOGRAM  11/21/2025    COLORECTAL CANCER SCREENING  02/02/2026    TDAP/TD VACCINES (2 - Td or Tdap) 12/16/2031    HEPATITIS C SCREENING  Completed    Pneumococcal Vaccine 65+  Completed    ZOSTER VACCINE  Completed                  CMS Preventative Services Quick Reference  Risk Factors Identified During Encounter  Alcohol Misuse:  cont same work wth AA and therapist  Depression/Dysphoria: Current medication is effective, no change recommended  Immunizations Discussed/Encouraged: COVID19  The above risks/problems have been discussed with the patient.  Pertinent information has been shared with the patient in the After Visit Summary.  An After Visit Summary and PPPS were made available to the patient.    Follow Up:   Next Medicare Wellness visit to be scheduled in 1 year.       Additional E&M Note during same encounter follows:  Patient has multiple medical problems which are significant and separately identifiable that require additional work above and beyond the Medicare Wellness Visit.      Chief Complaint  Medicare  "Wellness-subsequent    Subjective        HPI  Cathy Gutiérrez is also being seen today for depression, hypertension.  Dr. Jones added Wellbutrin and dextromethorphan and feels things are better. Sober now almost 2 years- she does AA meetings daily.   Does complain of decreased range of motion of her neck- there is no pain just ROM issue.   She is looking into doing more bhanu work.   Thyroid meds being changed by Dr. Anthony       Objective   Vital Signs:  /72   Pulse 78   Ht 170.2 cm (67\")   Wt 66.7 kg (147 lb)   BMI 23.02 kg/m²     Physical Exam  Constitutional:       Appearance: Normal appearance.   Eyes:      General: No scleral icterus.  Cardiovascular:      Rate and Rhythm: Normal rate and regular rhythm.   Pulmonary:      Effort: Pulmonary effort is normal.      Breath sounds: Normal breath sounds.   Chest:   Breasts:     Right: Normal.      Left: Normal.   Abdominal:      General: Bowel sounds are normal.      Palpations: Abdomen is soft.      Tenderness: There is no abdominal tenderness.   Musculoskeletal:      Right lower leg: No edema.      Left lower leg: No edema.   Lymphadenopathy:      Cervical: No cervical adenopathy.      Upper Body:      Right upper body: No supraclavicular or axillary adenopathy.      Left upper body: No supraclavicular or axillary adenopathy.   Skin:     General: Skin is warm and dry.   Psychiatric:         Mood and Affect: Mood normal.          The following data was reviewed by: Deborah Rodgers MD on 04/01/2024:  CMP          3/27/2024    08:22   CMP   Glucose 94    BUN 23    Creatinine 0.96    Sodium 139    Potassium 4.2    Chloride 99    Calcium 9.2    Total Protein 6.4    Albumin 4.6    Globulin 1.8    Total Bilirubin 0.3    Alkaline Phosphatase 65    AST (SGOT) 28    ALT (SGPT) 42    BUN/Creatinine Ratio 24.0      Lipid Panel          3/27/2024    08:22   Lipid Panel   Total Cholesterol 263    Triglycerides 187    HDL Cholesterol 67    VLDL Cholesterol 34    LDL " Cholesterol  162                 Assessment and Plan   Diagnoses and all orders for this visit:    1. Decreased ROM of neck (Primary)  Comments:  Will start with PT to eval- no pain, no indication for imaging.  Orders:  -     Ambulatory Referral to Physical Therapy Evaluate and treat    2. Abnormal mammogram  Comments:  due for dx mmg and u/s  Orders:  -     Mammo Diagnostic Digital Tomosynthesis Bilateral With CAD; Future  -     US Breast Left Complete; Future    3. Elevated ALT measurement  Comments:  new finding- repeat 6-8 weeks.  Orders:  -     Hepatic Function Panel    4. Need for pneumococcal 20-valent conjugate vaccination  -     Pneumococcal Conjugate Vaccine 20-Valent (PCV20)    5. Essential hypertension  Comments:  well controlled, no change.    6. Papillary thyroid carcinoma  Comments:  adjusting med with Dr. Anthony    7. Medicare annual wellness visit, subsequent  Comments:  Feels more stable- discussed reaching out to volunteer organizations. Work on regular exercise program.             Follow Up   Return in about 6 months (around 10/1/2024) for Recheck.  Patient was given instructions and counseling regarding her condition or for health maintenance advice. Please see specific information pulled into the AVS if appropriate.

## 2024-04-04 ENCOUNTER — HOSPITAL ENCOUNTER (OUTPATIENT)
Dept: BONE DENSITY | Facility: HOSPITAL | Age: 69
Discharge: HOME | End: 2024-04-04
Admitting: INTERNAL MEDICINE
Payer: MEDICARE

## 2024-04-04 PROCEDURE — 77080 DXA BONE DENSITY AXIAL: CPT

## 2024-04-08 RX ORDER — KETOCONAZOLE 20 MG/ML
SHAMPOO TOPICAL 3 TIMES WEEKLY
Qty: 120 ML | Refills: 1 | Status: SHIPPED | OUTPATIENT
Start: 2024-04-08

## 2024-04-09 DIAGNOSIS — I10 ESSENTIAL HYPERTENSION: Chronic | ICD-10-CM

## 2024-04-09 RX ORDER — IRBESARTAN AND HYDROCHLOROTHIAZIDE 300; 12.5 MG/1; MG/1
1 TABLET, FILM COATED ORAL DAILY
Qty: 90 TABLET | Refills: 1 | Status: SHIPPED | OUTPATIENT
Start: 2024-04-09

## 2024-04-10 ENCOUNTER — TELEPHONE (OUTPATIENT)
Dept: GASTROENTEROLOGY | Facility: CLINIC | Age: 69
End: 2024-04-10
Payer: MEDICARE

## 2024-04-10 NOTE — TELEPHONE ENCOUNTER
"Hub staff attempted to follow warm transfer process and was unsuccessful     Caller: Cathy Gutiérrez \"Eileen\"    Relationship to patient: Self    Best call back number: [phone]    Patient is needing: PATIENT RETURNED VADIM CALL TO DO THE OA OVER THE PHONE, I WAS UNABLE TO TRANSFER PATIENT TO THE OFFICE. PLEASE CONTACT PATIENT BACK, [place] SO SHE WILL TRY AND ANSWER WHEN YOU CALL BACK BUT IF NOT PLEASE LEAVE A MESSAGE.           "

## 2024-04-11 DIAGNOSIS — K21.9 GASTROESOPHAGEAL REFLUX DISEASE, UNSPECIFIED WHETHER ESOPHAGITIS PRESENT: Primary | ICD-10-CM

## 2024-04-11 DIAGNOSIS — K22.70 BARRETT'S ESOPHAGUS WITHOUT DYSPLASIA: ICD-10-CM

## 2024-04-11 RX ORDER — SODIUM CHLORIDE, SODIUM LACTATE, POTASSIUM CHLORIDE, CALCIUM CHLORIDE 600; 310; 30; 20 MG/100ML; MG/100ML; MG/100ML; MG/100ML
30 INJECTION, SOLUTION INTRAVENOUS CONTINUOUS
OUTPATIENT
Start: 2024-04-11

## 2024-04-13 NOTE — PROGRESS NOTES
[facility]              [address]                                                                                   [place] [address]                         Phone [phone]                   Fax [phone]        Physical Therapy Initial Evaluation and Plan of Care    Patient: Cathy Gutiérrez   : 1955  Diagnosis/ICD-10 Code:  Neck stiffness [M43.6]  Referring practitioner: Deborah Rodgers MD  Date of Initial Visit: 2024  Today's Date: 2024  Patient seen for 1 session         Visit Diagnoses:    ICD-10-CM ICD-9-CM   1. Neck stiffness  M43.6 723.5   2. DDD (degenerative disc disease), cervical  M50.30 722.4   3. Cervical stenosis of spine  M48.02 723.0         Subjective Questionnaire: NDI:8   Cervical xray:  There is severe disc space narrowing and spurring at C6-7.  There is also severe spurring of the lateral facets, particularly on the  left. There is some associated bony foraminal encroachment, particularly  at C4-5 on the left and at C6-7 on the right. There is straightening of  the cervical lordosis.  DEXA : osteopenia    Subjective Evaluation    History of Present Illness  Mechanism of injury: Insidious onset of neck stiffness several years ago.  Last few months noted more stiffness and people are starting to notice I'm not turning my neck    Subjective comment: Neck is frozen with movement. Also gets very tired and it's hard to hold up.   Also have right rotator cuff inflammation with lifting.  No numbness or tingling.  Aching better.  (-) valsalva, dizziness, cataract surgery no diplopia, tinnitis.  No h/o cervical issue or headaches.  PLOF: walking,  no change in normal activities except talking or driving.  Patient Occupation: retired.   Precautions and Work Restrictions: R elbow surgery, right rotator cuff pain, bilateral  knee pain, h/o foot fracturePain  Current pain ratin  Location: most stiff vs pain in  the neck, bilateral  Quality: dull ache  Relieving factors: medications and rest (towel roll)  Aggravating factors: movement and prolonged positioning  Progression: worsening    Social Support  Lives with: spouse    Diagnostic Tests  No diagnostic tests performed    Treatments  Previous treatment: massage (PT for hamstring also did some neck stretches, needling)  Current treatment: physical therapy  Patient Goals  Patient goals for therapy: increased motion and increased strength             Objective        Special Questions      Additional Special Questions  denies      Postural Observations  Seated posture: fair  Standing posture: fair      Palpation     Additional Palpation Details  No tenderness    Tenderness   Cervical Spine   No tenderness in the facet joint and spinous process.     Additional Tenderness Details  none    Neurological Testing     Sensation   Cervical/Thoracic   Left   Intact: light touch    Right   Intact: light touch    Reflexes   Left   Biceps (C5/C6): brisk (3+)  Brachioradialis (C6): brisk (3+)  Triceps (C7): brisk (3+)    Right   Biceps (C5/C6): normal (2+)  Brachioradialis (C6): normal (2+)  Triceps (C7): normal (2+)    Active Range of Motion   Cervical/Thoracic Spine   Cervical    Flexion: 35 degrees   Extension: 25 (stiff) degrees   Left lateral flexion: 15 degrees   Right lateral flexion: 10 degrees   Left rotation: 18 degrees   Right rotation: 28 degrees     Additional Active Range of Motion Details  Full shoulder ROM    Passive Range of Motion     Additional Passive Range of Motion Details  Extreme limitation in bilateral cervical sidebending, moderate with rotation.  OA moderate limitation.    Strength/Myotome Testing     Left Shoulder     Planes of Motion   Flexion: 5   Abduction: 5     Right Shoulder     Planes of Motion   Flexion: 5   Abduction: 5     Left Elbow   Flexion: 5  Extension: 5    Right Elbow   Flexion: 5  Extension: 5    Left Wrist/Hand   Wrist extension: 5  Wrist  flexion: 5    Right Wrist/Hand   Wrist extension: 5  Wrist flexion: 5    Additional Strength Details  Weakness cervical 2-/5    Tests   Cervical     Left   Positive cervical distraction.   Negative alar ligament integrity, active compression ([place]), Spurling's sign and transverse ligament.     Right   Positive cervical distraction.   Negative alar ligament integrity, active compression ([place]), Spurling's sign and transverse ligament.     Additional Tests Details  (-) vertebral artery.  More pressure left spurlings but no radiating pain.  Relief with cervical distraction       Access Code: [email or web address]  URL: [email or web address]  Date: 04/16/2024  Prepared by: Kayla Alvarez    Exercises  - Seated Cervical Rotation AROM  - 1 x daily - 7 x weekly - 1 sets - 10 reps  - Supine Cervical Rotation AROM on Pillow  - 1 x daily - 7 x weekly - 1 sets - 10 reps  - Supine Chin Tuck  - 1 x daily - 7 x weekly - 1 sets - 10 reps    Patient Education  - Managing Neck Pain  - Office Posture  - Sleep Positions    Assessment & Plan       Assessment  Impairments: abnormal muscle tone, abnormal or restricted ROM, activity intolerance, impaired physical strength and lacks appropriate home exercise program   Functional limitations: carrying objects, lifting, pulling, sitting and standing   Assessment details: Patient is a 70 yo female with several year h/o progressive neck stiffness and loss of function.  She does not recall any type of injury.     She does not have any significant tenderness. She does not have any radicular pain.The patient does not demonstrate any myotomal or dermatomal involvement.   She has moderate to severe joint stiffness in the cervical spine but without radiation. Testing for disc and nerve was normal. She presents with an evolving clinical presentation. She has multiple comorbidities that may affecting her care.  Patient is appropriate for skilled physical therapy in order to increase ROM, increase strength  and ease with daily mobility and return to I level of function.  Prognosis: good    Goals  Plan Goals: STG 4weeks  1.  Patient to tolerate initial exercises without increase in pain.  2.  Increase cervical rotation/SB ROM by 10 degrees.  3.  Decrease ache with   by 25%.  4. Reach overhead with tolerable symptoms to perform household tasks  LTG 8 weeks  1.  Patient to be independent with HEP  2.  Increase cervical rotation and SB ROM by 15 degrees.  3.  Turn neck during driving with tolerable symptoms.  4.  Functional testing improved 10%.  5.  Decreased fatigue to allow sitting up to 1 hour.        Plan  Therapy options: will be seen for skilled therapy services  Planned modality interventions: cryotherapy, electrical stimulation/Russian stimulation, dry needling, ultrasound, traction and thermotherapy (hydrocollator packs)  Planned therapy interventions: manual therapy, balance/weight-bearing training, neuromuscular re-education, stretching, strengthening, functional ROM exercises, gait training, home exercise program, body mechanics training and therapeutic activities  Frequency: 2x week  Duration in weeks: 8  Treatment plan discussed with: patient        History # of Personal Factors and/or Comorbidities: HIGH (3+)  Examination of Body System(s): # of elements: LOW (1-2)  Clinical Presentation: EVOLVING  Clinical Decision Making: LOW       Timed:         Manual Therapy:    -     mins  91437;     Therapeutic Exercise:    10     mins  66651;     Neuromuscular Narda:    -    mins  83790;    Therapeutic Activity:     -     mins  00707;     Gait Training:      -     mins  79229;     Ultrasound:     -     mins  39695;    Ionto                               -    mins   73342  Self Care                       13    mins   66780  Orthotic fit/train              -   mins  03893  Self Pay 15  -       mins   PTSPMIN1  Self Pay 30  -      mins  PTSPMIN2   Dry Needling Tri __-__ DNTRIAL    Un-Timed:  Electrical Stimulation:    -      mins  65922 ( );  Dry Needling     -     mins self-pay  Traction     -     mins 02155  Low Eval     20     Mins  94586  Mod Eval     -     Mins  54020  High Eval                       -     Mins  12364        Timed Treatment:   23   mins   Total Treatment:     55   mins          PT: Kayla Alvarez PT, SHAREE     License Number: [id number]  Electronically signed by Kayla Alvarez, PT, 04/16/24, 6:58 PM EDT    Certification Period: 4/16/2024 thru 7/14/2024  I certify that the therapy services are furnished while this patient is under my care.  The services outlined above are required by this patient, and will be reviewed every 90 days.         Physician Signature:__________________________________________________    PHYSICIAN:       DATE:     Please sign and return via fax to [phone]. Thank you, [facility].

## 2024-04-16 ENCOUNTER — TREATMENT (OUTPATIENT)
Age: 69
End: 2024-04-16
Payer: MEDICARE

## 2024-04-16 DIAGNOSIS — R29.898 DECREASED ROM OF NECK: Primary | ICD-10-CM

## 2024-04-16 DIAGNOSIS — M50.30 DDD (DEGENERATIVE DISC DISEASE), CERVICAL: ICD-10-CM

## 2024-04-16 DIAGNOSIS — M48.02 CERVICAL STENOSIS OF SPINE: ICD-10-CM

## 2024-04-16 DIAGNOSIS — M43.6 NECK STIFFNESS: ICD-10-CM

## 2024-04-16 PROCEDURE — 97110 THERAPEUTIC EXERCISES: CPT | Performed by: PHYSICAL THERAPIST

## 2024-04-16 PROCEDURE — 97535 SELF CARE MNGMENT TRAINING: CPT | Performed by: PHYSICAL THERAPIST

## 2024-04-16 PROCEDURE — 97161 PT EVAL LOW COMPLEX 20 MIN: CPT | Performed by: PHYSICAL THERAPIST

## 2024-04-18 ENCOUNTER — TELEPHONE (OUTPATIENT)
Dept: GASTROENTEROLOGY | Facility: CLINIC | Age: 69
End: 2024-04-18

## 2024-04-18 ENCOUNTER — TELEPHONE (OUTPATIENT)
Dept: GASTROENTEROLOGY | Facility: CLINIC | Age: 69
End: 2024-04-18
Payer: MEDICARE

## 2024-04-18 NOTE — TELEPHONE ENCOUNTER
"  Hub staff attempted to follow warm transfer process and was unsuccessful     Caller: Cathy Gutiérrez \"Eileen\"    Relationship to patient: Self    Best call back number: [phone]    Patient is needing: PATIENT NEEDING TO SCHEDULE SCOPE.  PLEASE REACH OUT TO SCHEDULE. THANK YOU   "

## 2024-04-19 NOTE — TELEPHONE ENCOUNTER
"Hub staff attempted to follow warm transfer process and was unsuccessful     Caller: Cathy Gutiérrez \"Eileen\"    Relationship to patient: Self    Best call back number: [phone]    Patient is needing:   PATIENT IS RETURNING A CALL FROM [facility] TO SCHEDULE SCOPE.  PLEASE CALL BACK TO SCHEDULE.        "

## 2024-04-23 ENCOUNTER — TELEPHONE (OUTPATIENT)
Dept: GASTROENTEROLOGY | Facility: CLINIC | Age: 69
End: 2024-04-23
Payer: MEDICARE

## 2024-04-23 NOTE — TELEPHONE ENCOUNTER
"Hub staff attempted to follow warm transfer process and was unsuccessful      Caller: Cathy Gutiérrez \"Eileen\"     Relationship to patient: Self     Best call back number: [phone] (Mobile)      Patient is needing: PT TAKES CARE OF HER GRANDDAUGHTER MON, WED,  AND THURS SO SHE IS AVAIL FOR SCHEDULING ON A TUESDAY OR FRIDAY.      TODAY SHE WILL BE AVAIL TO SPEAK WITH LUIS AFTER 10A AND BEFORE 11:30 AND AFTER 1:30P. PLEASE CALL DURING THESE TIMES.      "

## 2024-04-23 NOTE — TELEPHONE ENCOUNTER
"Hub staff attempted to follow warm transfer process and was unsuccessful     Caller: Cathy Gutiérrez \"Eileen\"    Relationship to patient: Self    Best call back number: [phone] (Mobile)     Patient is needing: PT TAKES CARE OF HER GRANDDAUGHTER MON, WED,  AND THURS SO SHE IS AVAIL FOR SCHEDULING ON A TUESDAY OR FRIDAY.     TODAY SHE WILL BE AVAIL TO SPEAK WITH LUIS AFTER 10A AND BEFORE 11:30 AND AFTER 1:30P. PLEASE CALL DURING THESE TIMES.   "

## 2024-04-26 ENCOUNTER — TREATMENT (OUTPATIENT)
Age: 69
End: 2024-04-26
Payer: MEDICARE

## 2024-04-26 DIAGNOSIS — M50.30 DDD (DEGENERATIVE DISC DISEASE), CERVICAL: ICD-10-CM

## 2024-04-26 DIAGNOSIS — M48.02 CERVICAL STENOSIS OF SPINE: ICD-10-CM

## 2024-04-26 DIAGNOSIS — M43.6 NECK STIFFNESS: Primary | ICD-10-CM

## 2024-04-26 DIAGNOSIS — R29.898 DECREASED ROM OF NECK: ICD-10-CM

## 2024-04-26 NOTE — PROGRESS NOTES
Physical Therapy Daily Progress Note      Patient: Cathy Gutiérrez   : 1955  Referring practitioner: Deborah Rodgers MD  Date of Initial Visit: Type: THERAPY  Noted: 2024  Diagnosis/ICD-10 Code:  Neck stiffness [M43.6]  Today's Date: 2024  Patient seen for 2 sessions         Cathy Gutiérrez reports: no pain with exercises.  They took me a long time to complete.  I would like to try dry needling on my neck today.     History:  pt denies local lymphedema in area to be treated, active infection, blood bourne illness, needle phobia, abnormal bleeding tendency, use of blood thinners, any compromised immune system, recent surgery, or pregnancy.            Objective          Palpation   Left   Hypertonic in the cervical paraspinals, levator scapulae, suboccipitals and upper trapezius.   Trigger point to levator scapulae and upper trapezius.     Right   Hypertonic in the cervical paraspinals, levator scapulae, suboccipitals and upper trapezius. Tenderness of the upper trapezius.   Trigger point to levator scapulae and upper trapezius.       See Exercise, Manual, and Modality Logs for complete treatment.      Patient was instructed in indications for dry needling treatment,  conditions treated, procedure to be performed, possible side effects, contraindications, and risks of the procedure.Chart reviewed for any contraindications to treatment.  All questions were answered and patient given information sheet.  Patient agreed to have dry needling treatment performed and signed the consent.         Assessment/Plan  Patient instructed in only 1 set of exercises (misunderstood HEP page as 10 sets) however no increased pain.  Instructed in light stretches with increased hold for improved mobility as others were well tolerated.  Most limited in sideglide mobility especially to the left on the right side. Using threading and direct techniques, treatment well tolerated without adverse response. Clean needle  technique and gloves used at all times. Precautions utilized for lung fields and neurovascular structures.   No adverse response noted. Manual palpation and assessment performed before, during and after needling.  Plan to continue needling 1x per week and instructed to apply heat to sore areas tonight.          Progress strengthening /stabilization /functional activity           Timed:  Manual Therapy:    15     mins  52804;  Therapeutic Exercise:    10     mins  42750;     Neuromuscular Narda:    -    mins  95994;    Therapeutic Activity:     -     mins  79446;   Gait Training:                 -     mins  10986;   Self Care                       -     mins   45371;  Orthotic fit/train              -     mins  43258;   Dry Needling Tri          __-__ DNTRIAL;   Ultrasound:     -     mins  83495;  Ionto                          -     mins 33617 ;  Self Pay 15                  -       mins   PTSPMIN1  Self Pay 30                  -      mins  PTSPMIN2      Untimed:  Electrical Stimulation:    -     mins  77228 ( );  Mechanical Traction:    -     mins  55403;   Dry Needling:              __15_ mins 54093, 43582    Timed Treatment:   25+ needling   mins   Total Treatment:     50   mins  Kayla Alvarez PT, CIDN  Physical Therapist

## 2024-04-30 ENCOUNTER — HOSPITAL ENCOUNTER (OUTPATIENT)
Dept: MAMMOGRAPHY | Facility: HOSPITAL | Age: 69
Discharge: HOME | End: 2024-04-30
Payer: MEDICARE

## 2024-04-30 ENCOUNTER — TREATMENT (OUTPATIENT)
Age: 69
End: 2024-04-30

## 2024-04-30 ENCOUNTER — HOSPITAL ENCOUNTER (OUTPATIENT)
Dept: ULTRASOUND IMAGING | Facility: HOSPITAL | Age: 69
Discharge: HOME | End: 2024-04-30
Payer: MEDICARE

## 2024-04-30 DIAGNOSIS — R92.8 ABNORMAL MAMMOGRAM: Chronic | ICD-10-CM

## 2024-04-30 DIAGNOSIS — M43.6 NECK STIFFNESS: Primary | ICD-10-CM

## 2024-04-30 DIAGNOSIS — M48.02 CERVICAL STENOSIS OF SPINE: ICD-10-CM

## 2024-04-30 DIAGNOSIS — M50.30 DDD (DEGENERATIVE DISC DISEASE), CERVICAL: ICD-10-CM

## 2024-04-30 DIAGNOSIS — R29.898 DECREASED ROM OF NECK: ICD-10-CM

## 2024-04-30 PROCEDURE — G0279 TOMOSYNTHESIS, MAMMO: HCPCS

## 2024-04-30 PROCEDURE — 77066 DX MAMMO INCL CAD BI: CPT

## 2024-04-30 PROCEDURE — 20561 NDL INSJ W/O NJX 3+ MUSC: CPT | Performed by: PHYSICAL THERAPIST

## 2024-04-30 PROCEDURE — 76642 ULTRASOUND BREAST LIMITED: CPT

## 2024-04-30 NOTE — PROGRESS NOTES
[facility] [place]   [address]  [place] [address]  Phone [phone]  Fax [phone]          Physical Therapy Daily Treatment Note      Patient: Cathy Gutiérrez   : 1955  Referring practitioner: Deborah Rodgers MD  Date of Initial Visit: Type: THERAPY  Noted: 2024  Today's Date: 2024  Patient seen for 3 sessions       Visit Diagnoses:    ICD-10-CM ICD-9-CM   1. Neck stiffness  M43.6 723.5   2. DDD (degenerative disc disease), cervical  M50.30 722.4   3. Cervical stenosis of spine  M48.02 723.0   4. Decreased ROM of neck [R29.898]  R29.898 723.8       Subjective   Patient reports she did well after treatment last time.  No significant sorness and feels like she has more movement.     Objective          Palpation   Left   Hypertonic in the levator scapulae, sternocleidomastoid, suboccipitals, thoracic paraspinals and upper trapezius.   Tenderness of the levator scapulae, thoracic paraspinals and upper trapezius.     Right   Hypertonic in the levator scapulae, rhomboids, suboccipitals, thoracic paraspinals and upper trapezius. Tenderness of the levator scapulae, rhomboids, thoracic paraspinals and upper trapezius.       Improved cervical ROM following treatment.    See Exercise, Manual, and Modality Logs for complete treatment.          Assessment/Plan  Using threading and direct techniques, treatment well tolerated without adverse response. Clean needle technique and gloves used at all times. Precautions utilized for lung fields and neurovascular structures. No adverse response noted. Manual palpation and assessment performed before, during and after needling.   After treatment patient presents with increased ROM and decreased pain/tenderness. Plan to continue needling 1x per week and instructed to apply heat to sore areas tonight.   Manual next visit        Timed:         Manual Therapy:    -     mins  52133;     Therapeutic Exercise:    -      mins  74728;     Neuromuscular Narda:    -    mins  84952;    Therapeutic Activ5    mins  71136;     Ultrasound:     -     mins  71712;    Ionto                               -    mins   52501  Self Care                       -     mins   86303  Orthotic Fit/Train           -     mins 21030  Dry Needling Tri __-__ DNTRIAL    Un-Timed:  Electrical Stimulation:    -     mins  55640 ( );  Dry Needling     15     mins self-pay  Traction     -     mins 77317      Timed Treatment:   20   mins   Total Treatment:     40   mins    Kyala Alvarez PT, CIDN  [place] License: [id number]

## 2024-05-07 ENCOUNTER — TREATMENT (OUTPATIENT)
Age: 69
End: 2024-05-07
Payer: MEDICARE

## 2024-05-07 DIAGNOSIS — M50.30 DDD (DEGENERATIVE DISC DISEASE), CERVICAL: ICD-10-CM

## 2024-05-07 DIAGNOSIS — M48.02 CERVICAL STENOSIS OF SPINE: ICD-10-CM

## 2024-05-07 DIAGNOSIS — R29.898 DECREASED ROM OF NECK: ICD-10-CM

## 2024-05-07 DIAGNOSIS — M43.6 NECK STIFFNESS: Primary | ICD-10-CM

## 2024-05-07 PROCEDURE — 97140 MANUAL THERAPY 1/> REGIONS: CPT | Performed by: PHYSICAL THERAPIST

## 2024-05-07 PROCEDURE — 97110 THERAPEUTIC EXERCISES: CPT | Performed by: PHYSICAL THERAPIST

## 2024-05-10 ENCOUNTER — ANESTHESIA (OUTPATIENT)
Dept: GASTROENTEROLOGY | Facility: HOSPITAL | Age: 69
End: 2024-05-10
Payer: MEDICARE

## 2024-05-10 ENCOUNTER — ANESTHESIA EVENT (OUTPATIENT)
Dept: GASTROENTEROLOGY | Facility: HOSPITAL | Age: 69
End: 2024-05-10
Payer: MEDICARE

## 2024-05-10 ENCOUNTER — HOSPITAL ENCOUNTER (OUTPATIENT)
Facility: HOSPITAL | Age: 69
Setting detail: HOSPITAL OUTPATIENT SURGERY
Discharge: HOME | End: 2024-05-10
Attending: INTERNAL MEDICINE | Admitting: INTERNAL MEDICINE
Payer: MEDICARE

## 2024-05-10 VITALS
DIASTOLIC BLOOD PRESSURE: 84 MMHG | WEIGHT: 151 LBS | HEIGHT: 67 IN | SYSTOLIC BLOOD PRESSURE: 121 MMHG | OXYGEN SATURATION: 97 % | HEART RATE: 72 BPM | BODY MASS INDEX: 23.7 KG/M2 | RESPIRATION RATE: 16 BRPM

## 2024-05-10 DIAGNOSIS — K22.70 BARRETT'S ESOPHAGUS WITHOUT DYSPLASIA: ICD-10-CM

## 2024-05-10 DIAGNOSIS — K21.9 GASTROESOPHAGEAL REFLUX DISEASE, UNSPECIFIED WHETHER ESOPHAGITIS PRESENT: ICD-10-CM

## 2024-05-10 PROCEDURE — 88305 TISSUE EXAM BY PATHOLOGIST: CPT | Performed by: INTERNAL MEDICINE

## 2024-05-10 PROCEDURE — 43239 EGD BIOPSY SINGLE/MULTIPLE: CPT | Performed by: INTERNAL MEDICINE

## 2024-05-10 PROCEDURE — 25810000003 LACTATED RINGERS PER 1000 ML: Performed by: INTERNAL MEDICINE

## 2024-05-10 PROCEDURE — 25010000002 PROPOFOL 1000 MG/100ML EMULSION

## 2024-05-10 PROCEDURE — S0260 H&P FOR SURGERY: HCPCS | Performed by: INTERNAL MEDICINE

## 2024-05-10 PROCEDURE — 87081 CULTURE SCREEN ONLY: CPT | Performed by: INTERNAL MEDICINE

## 2024-05-10 RX ORDER — SODIUM CHLORIDE, SODIUM LACTATE, POTASSIUM CHLORIDE, CALCIUM CHLORIDE 600; 310; 30; 20 MG/100ML; MG/100ML; MG/100ML; MG/100ML
30 INJECTION, SOLUTION INTRAVENOUS CONTINUOUS
Status: DISCONTINUED | OUTPATIENT
Start: 2024-05-10 | End: 2024-05-10 | Stop reason: HOSPADM

## 2024-05-10 RX ORDER — PROPOFOL 10 MG/ML
INJECTION, EMULSION INTRAVENOUS CONTINUOUS PRN
Status: DISCONTINUED | OUTPATIENT
Start: 2024-05-10 | End: 2024-05-10 | Stop reason: SURG

## 2024-05-10 RX ORDER — LIDOCAINE HYDROCHLORIDE 20 MG/ML
INJECTION, SOLUTION INFILTRATION; PERINEURAL AS NEEDED
Status: DISCONTINUED | OUTPATIENT
Start: 2024-05-10 | End: 2024-05-10 | Stop reason: SURG

## 2024-05-10 RX ADMIN — PROPOFOL INJECTABLE EMULSION 160 MCG/KG/MIN: 10 INJECTION, EMULSION INTRAVENOUS at 11:04

## 2024-05-10 RX ADMIN — PROPOFOL INJECTABLE EMULSION 100 MG: 10 INJECTION, EMULSION INTRAVENOUS at 11:03

## 2024-05-10 RX ADMIN — SODIUM CHLORIDE, POTASSIUM CHLORIDE, SODIUM LACTATE AND CALCIUM CHLORIDE 30 ML/HR: 600; 310; 30; 20 INJECTION, SOLUTION INTRAVENOUS at 10:44

## 2024-05-10 RX ADMIN — LIDOCAINE HYDROCHLORIDE 60 MG: 20 INJECTION, SOLUTION INFILTRATION; PERINEURAL at 11:03

## 2024-05-10 NOTE — DISCHARGE INSTRUCTIONS

## 2024-05-12 LAB — UREASE TISS QL: NEGATIVE

## 2024-05-13 LAB
LAB AP CASE REPORT: NORMAL
PATH REPORT.FINAL DX SPEC: NORMAL
PATH REPORT.GROSS SPEC: NORMAL

## 2024-05-14 ENCOUNTER — TELEPHONE (OUTPATIENT)
Dept: GASTROENTEROLOGY | Facility: CLINIC | Age: 69
End: 2024-05-14
Payer: MEDICARE

## 2024-05-14 ENCOUNTER — TREATMENT (OUTPATIENT)
Age: 69
End: 2024-05-14
Payer: MEDICARE

## 2024-05-14 DIAGNOSIS — M50.30 DDD (DEGENERATIVE DISC DISEASE), CERVICAL: ICD-10-CM

## 2024-05-14 DIAGNOSIS — M48.02 CERVICAL STENOSIS OF SPINE: ICD-10-CM

## 2024-05-14 DIAGNOSIS — M43.6 NECK STIFFNESS: Primary | ICD-10-CM

## 2024-05-14 PROCEDURE — 20561 NDL INSJ W/O NJX 3+ MUSC: CPT | Performed by: PHYSICAL THERAPIST

## 2024-05-14 NOTE — TELEPHONE ENCOUNTER
Angelito Kelley MD  [facility] Clinical 1 Pool  Okay to call results, no evidence of Arellano's esophagus.  If she is obtaining symptomatic relief would continue pantoprazole.

## 2024-05-14 NOTE — PROGRESS NOTES
Physical Therapy Daily Progress Note      Patient: Cathy Gutiérrez   : 1955  Referring practitioner: Deborah Rodgers MD  Date of Initial Visit: Type: THERAPY  Noted: 2024  Diagnosis/ICD-10 Code:  Neck stiffness [M43.6]  Today's Date: 2024  Patient seen for 5 sessions         Cathy Gutiérrez reports: I feel like it is getting looser but when I look in the mirror it's still tight.  Sore after treatment last time but overall less stiffness.              Objective          Palpation   Left   Hypertonic in the cervical paraspinals, levator scapulae, rhomboids, sternocleidomastoid, suboccipitals and upper trapezius.   Tenderness of the levator scapulae, rhomboids and upper trapezius.   Trigger point to levator scapulae and upper trapezius.     Right   Hypertonic in the cervical paraspinals, levator scapulae, rhomboids, sternocleidomastoid, suboccipitals and upper trapezius. Tenderness of the levator scapulae, rhomboids and upper trapezius.   Trigger point to upper trapezius.     Active Range of Motion   Cervical/Thoracic Spine   Cervical    Left lateral flexion: 12 degrees   Right lateral flexion: 15 degrees   Left rotation: 24 degrees   Right rotation: 55 degrees       See Exercise, Manual, and Modality Logs for complete treatment.          Assessment/Plan  Patient presents with improving cervical rotation especially to the right.  Cervical pvm tone improved with worst area still along levator, upper trap, and rhomboids.  Continues with decreased mobility in sidebending mostly bony but also limitations in upper trap and levator flexibility.       Progress per Plan of Care  1-2X week         Timed:  Manual Therapy:    -     mins  94145;  Therapeutic Exercise:    -     mins  58904;     Neuromuscular Narda:    -    mins  82626;    Therapeutic Activity:     5     mins  80049;   Gait Training:                 -     mins  18212;   Self Care                       -     mins   04724;  Orthotic fit/train               -     mins  99073;   Dry Needling Tri          __-__ DNTRIAL;   Ultrasound:     -     mins  78973;  Ionto                          -     mins 27014 ;  Self Pay 15                  -       mins   PTSPMIN1  Self Pay 30                  -      mins  PTSPMIN2      Untimed:  Electrical Stimulation:    -     mins  33392 ( );  Mechanical Traction:    -     mins  77708;   Dry Needling:              _20_ mins 77151, 76464    Timed Treatment:   25   mins   Total Treatment:     35   mins  Kayla Alvarez PT, RAGHAVN  Physical Therapist

## 2024-05-15 ENCOUNTER — TELEPHONE (OUTPATIENT)
Dept: GASTROENTEROLOGY | Facility: CLINIC | Age: 69
End: 2024-05-15

## 2024-05-15 NOTE — TELEPHONE ENCOUNTER
"Hub staff attempted to follow warm transfer process and was unsuccessful     Caller: Cathy Gutiérrez \"Eileen\"    Relationship to patient: Self    Best call back number: [phone]    Patient is needing: PT RETURNED CALL TO NURSE, ORIGINAL MESSAGE 5/14/24.    BEST TO REACH PT TODAY AFTER 2PM AND TOMORROW AVOID HOURS OF 12P-2PM.   "

## 2024-05-16 NOTE — TELEPHONE ENCOUNTER
"Hub staff attempted to follow warm transfer process and was unsuccessful     Caller: Cathy Gutiérrez \"Eileen\"    Relationship to patient: Self    Best call back number: [phone]    Patient is needing:RETURNED CALL TO [facility] AND GAVE PERMISSION TO SEND RESULTS TO [facility].   "

## 2024-05-16 NOTE — TELEPHONE ENCOUNTER
Called pt and advised per path report that the sm bowel bx was benign. The stomach bx was also benign. The ge junciton bx showed min chronic inflammation and was neg for mckeon's esophagus.  Also the mid esophageal bx showed min chronic inflammation and was neg for mckeon's. Advised of Dr Kelley's recommendations. Verb understanding.   Pt would like to continue her pantoprazole.

## 2024-05-17 LAB
ALBUMIN SERPL-MCNC: 4.3 G/DL (ref 3.5–5.2)
ALP SERPL-CCNC: 63 U/L (ref 39–117)
ALT SERPL-CCNC: 31 U/L (ref 1–33)
AST SERPL-CCNC: 24 U/L (ref 1–32)
BILIRUB DIRECT SERPL-MCNC: <0.2 MG/DL (ref 0–0.3)
BILIRUB SERPL-MCNC: 0.3 MG/DL (ref 0–1.2)
PROT SERPL-MCNC: 6.2 G/DL (ref 6–8.5)

## 2024-05-21 ENCOUNTER — TREATMENT (OUTPATIENT)
Age: 69
End: 2024-05-21
Payer: MEDICARE

## 2024-05-21 DIAGNOSIS — M48.02 CERVICAL STENOSIS OF SPINE: ICD-10-CM

## 2024-05-21 DIAGNOSIS — M43.6 NECK STIFFNESS: Primary | ICD-10-CM

## 2024-05-21 DIAGNOSIS — R29.898 DECREASED ROM OF NECK: ICD-10-CM

## 2024-05-21 DIAGNOSIS — M50.30 DDD (DEGENERATIVE DISC DISEASE), CERVICAL: ICD-10-CM

## 2024-05-21 PROCEDURE — 97110 THERAPEUTIC EXERCISES: CPT | Performed by: PHYSICAL THERAPIST

## 2024-05-21 PROCEDURE — 97140 MANUAL THERAPY 1/> REGIONS: CPT | Performed by: PHYSICAL THERAPIST

## 2024-05-21 PROCEDURE — 97012 MECHANICAL TRACTION THERAPY: CPT | Performed by: PHYSICAL THERAPIST

## 2024-05-21 NOTE — PROGRESS NOTES
Physical Therapy Daily Progress Note      Patient: Cathy Gutiérrez   : 1955  Referring practitioner: Deborah Rodgers MD  Date of Initial Visit: Type: THERAPY  Noted: 2024  Diagnosis/ICD-10 Code:  Neck stiffness [M43.6]  Today's Date: 2024  Patient seen for 6 sessions         Cathy Gutiérrez reports: some soreness over the weekend. Feels fine today.  Still restricted with left rotation but seems to be slowly getting better.              Objective   See Exercise, Manual, and Modality Logs for complete treatment.    Cervical rotation left 29      Assessment/Plan  Patient presents with improving ROM, still most restricted in extension and sidebending with joint mobility  Relief with manual traction.  Trialed mechanical.  Continues with joint restriction but much less muscular tightness.       Other - dry needle next session, assess traction           Timed:  Manual Therapy:    20     mins  49367;  Therapeutic Exercise:    8     mins  18618;     Neuromuscular Narda:    -    mins  51875;    Therapeutic Activity:     -     mins  27940;   Gait Training:                 -     mins  04463;   Self Care                       -     mins   82104;  Orthotic fit/train              -     mins  00626;   Dry Needling Tri          __-__ DNTRIAL;   Ultrasound:     -     mins  24214;  Ionto                          -     mins 38872 ;  Self Pay 15                  -       mins   PTSPMIN1  Self Pay 30                  -      mins  PTSPMIN2      Untimed:  Electrical Stimulation:    -     mins  51583 ( );  Mechanical Traction:    10     mins  72247;   Dry Needling:              __-_ mins 95703, 57789    Timed Treatment:   28   mins   Total Treatment:     45   mins  Kayla Alvarez, PT, CIDN  Physical Therapist

## 2024-05-28 ENCOUNTER — TREATMENT (OUTPATIENT)
Age: 69
End: 2024-05-28
Payer: MEDICARE

## 2024-05-28 DIAGNOSIS — M50.30 DDD (DEGENERATIVE DISC DISEASE), CERVICAL: ICD-10-CM

## 2024-05-28 DIAGNOSIS — M43.6 NECK STIFFNESS: Primary | ICD-10-CM

## 2024-05-28 DIAGNOSIS — M48.02 CERVICAL STENOSIS OF SPINE: ICD-10-CM

## 2024-05-28 DIAGNOSIS — R29.898 DECREASED ROM OF NECK: ICD-10-CM

## 2024-05-28 PROCEDURE — 20561 NDL INSJ W/O NJX 3+ MUSC: CPT | Performed by: PHYSICAL THERAPIST

## 2024-05-28 NOTE — PROGRESS NOTES
[facility] [place]   [address]  [place] [address]  Phone [phone]  Fax [phone]          Physical Therapy Daily Treatment Note      Patient: Cathy Gutiérrez   : 1955  Referring practitioner: Deborah Rodgers MD  Date of Initial Visit: Type: THERAPY  Noted: 2024  Today's Date: 2024  Patient seen for 7 sessions       Visit Diagnoses:    ICD-10-CM ICD-9-CM   1. Neck stiffness  M43.6 723.5   2. DDD (degenerative disc disease), cervical  M50.30 722.4   3. Cervical stenosis of spine  M48.02 723.0   4. Decreased ROM of neck [R29.898]  R29.898 723.8       Subjective very tight right upper trap with driving, slept in hotel bed.  TMJ on right is flared up also.    Objective          Palpation   Left   Hypertonic in the levator scapulae, suboccipitals and upper trapezius.   Tenderness of the levator scapulae, suboccipitals and upper trapezius.     Right   Hypertonic in the levator scapulae, pectoralis major, rhomboids, suboccipitals and upper trapezius. Tenderness of the levator scapulae, pectoralis major, rhomboids, suboccipitals and upper trapezius.     Additional Palpation Details  B masseters, lateral pterygoids, and TMJ      See Exercise, Manual, and Modality Logs for complete treatment.      - Controlled Jaw Opening with Tongue on Roof of Mouth  - 1 x daily - 7 x weekly - 1 sets - 10 reps  - Jaw Opening  - 1 x daily - 7 x weekly - 1 sets - 10 reps - 5 hold  - Jaw Opening Mobilization  - 1 x daily - 7 x weekly - 1 sets - 10 reps    Assessment/Plan  Using threading and direct techniques, treatment well tolerated without adverse response. Clean needle technique and gloves used at all times. Precautions utilized for lung fields and neurovascular structures. No adverse response noted. Manual palpation and assessment performed before, during and after needling.   After treatment patient presents with increased ROM and decreased pain/tenderness. Plan  to continue needling 1x per week and instructed to apply heat to sore areas tonight.         Timed:         Manual Therapy:    -     mins  41309;     Therapeutic Exercise:    -     mins  70564;     Neuromuscular Narda:    -    mins  30538;    Therapeutic Activity:     -     mins  72312;     Gait Training:      -     mins  16632;     Ultrasound:     -     mins  94562;    Ionto                               -    mins   78918  Self Care                       -     mins   65811  Orthotic Fit/Train           -     mins 97641  Dry Needling Tri __-__ DNTRIAL    Un-Timed:  Electrical Stimulation:    -     mins  49015 ( );  Dry Needling     20     mins self-pay  Traction     -     mins 87362      Timed Treatment:   20   mins   Total Treatment:     30   mins    Kayla Alvarez PT, CIDN  [place] License: [id number]

## 2024-06-03 ENCOUNTER — TREATMENT (OUTPATIENT)
Age: 69
End: 2024-06-03
Payer: MEDICARE

## 2024-06-03 DIAGNOSIS — R29.898 DECREASED ROM OF NECK: ICD-10-CM

## 2024-06-03 DIAGNOSIS — M48.02 CERVICAL STENOSIS OF SPINE: ICD-10-CM

## 2024-06-03 DIAGNOSIS — M43.6 NECK STIFFNESS: Primary | ICD-10-CM

## 2024-06-03 DIAGNOSIS — M50.30 DDD (DEGENERATIVE DISC DISEASE), CERVICAL: ICD-10-CM

## 2024-06-03 PROCEDURE — 97140 MANUAL THERAPY 1/> REGIONS: CPT | Performed by: PHYSICAL THERAPIST

## 2024-06-03 PROCEDURE — 97110 THERAPEUTIC EXERCISES: CPT | Performed by: PHYSICAL THERAPIST

## 2024-06-03 PROCEDURE — 97012 MECHANICAL TRACTION THERAPY: CPT | Performed by: PHYSICAL THERAPIST

## 2024-06-03 NOTE — PROGRESS NOTES
Physical Therapy Daily Progress Note      Patient: Cathy Gutiérrez   : 1955  Referring practitioner: Deborah Rodgers MD  Date of Initial Visit: Type: THERAPY  Noted: 2024  Diagnosis/ICD-10 Code:  Neck stiffness [M43.6]  Today's Date: 6/3/2024  Patient seen for 8 sessions         Cathy Gutiérrez reports: so much better after the needling treatment last time.  Mild left jaw soreness today and tight knot on right.  Mostly just stiffness.                Objective   See Exercise, Manual, and Modality Logs for complete treatment.   Observation:   bilateralcervical spine ROM:  left AA >right rotation, OA improved, stiffness C4-6 sideglides and rotation  Tenderness:  right upper trap  Left elevated first rib      Assessment/Plan  Patient presents with improving upper cervical mobility, soft tissue tightness, and pain.  Continues with left lower cervical mobility restriction.  Good relief with dry needling, traction, and manual joint mobs.       Progress per Plan of Care           Timed:  Manual Therapy:    15     mins  58966;  Therapeutic Exercise:    10     mins  39436;     Neuromuscular Narda:    -    mins  93538;    Therapeutic Activity:     -     mins  67324;   Gait Training:                 -     mins  27304;   Self Care                       -     mins   49434;  Orthotic fit/train              -     mins  79131;   Dry Needling Tri          __-__ DNTRIAL;   Ultrasound:     -     mins  48747;  Ionto                          -     mins 16588 ;  Self Pay 15                  -       mins   PTSPMIN1  Self Pay 30                  -      mins  PTSPMIN2      Untimed:  Electrical Stimulation:    -     mins  98722 (MC );  Mechanical Traction:    10     mins  41729;   Dry Needling:              __-_ mins ,     Timed Treatment:   40   mins   Total Treatment:     50   mins  Kayla Alvarez PT, CIDN  Physical Therapist

## 2024-06-11 ENCOUNTER — TREATMENT (OUTPATIENT)
Age: 69
End: 2024-06-11

## 2024-06-11 DIAGNOSIS — M43.6 NECK STIFFNESS: Primary | ICD-10-CM

## 2024-06-11 DIAGNOSIS — M50.30 DDD (DEGENERATIVE DISC DISEASE), CERVICAL: ICD-10-CM

## 2024-06-11 DIAGNOSIS — M48.02 CERVICAL STENOSIS OF SPINE: ICD-10-CM

## 2024-06-11 DIAGNOSIS — R29.898 DECREASED ROM OF NECK: ICD-10-CM

## 2024-06-11 NOTE — PROGRESS NOTES
[facility] [place]   [address]  [place] [address]  Phone [phone]  Fax [phone]          Physical Therapy Daily Treatment Note      Patient: Cathy Gutiérrez   : 1955  Referring practitioner: Deborah Rodgers MD  Date of Initial Visit: Type: THERAPY  Noted: 2024  Today's Date: 2024  Patient seen for 9 sessions       Visit Diagnoses:    ICD-10-CM ICD-9-CM   1. Neck stiffness  M43.6 723.5   2. DDD (degenerative disc disease), cervical  M50.30 722.4   3. Cervical stenosis of spine  M48.02 723.0   4. Decreased ROM of neck [R29.898]  R29.898 723.8       Subjective   Better, no pain today.  Just stiff.  Right thumb arthritis is flared today.    Objective          Palpation     Right Tenderness of the wrist extensors.     Tenderness     Right Wrist/Hand   Tenderness in the first dorsal compartment and carpometacarpal joint.       See Exercise, Manual, and Modality Logs for complete treatment.          Assessment/Plan  Using threading and direct techniques, treatment well tolerated without adverse response. Clean needle technique and gloves used at all times. Precautions utilized for lung fields and neurovascular structures. No adverse response noted. Manual palpation and assessment performed before, during and after needling.   After treatment patient presents with increased ROM and decreased pain/tenderness. Plan to continue needling 2x per month and instructed to apply heat to sore areas tonight.         Timed:         Manual Therapy:    -     mins  15532;     Therapeutic Exercise:    -     mins  16175;     Neuromuscular Narda:    -    mins  60403;    Therapeutic Activity:     -     mins  21614;     Gait Training:      -     mins  26231;     Ultrasound:     -     mins  70774;    Ionto                               -    mins   05576  Self Care                       -     mins   49951  Orthotic Fit/Train           -     mins 05210  Dry Needling  Tri __-__ DNTRIAL    Un-Timed:  Electrical Stimulation:    -     mins  89934 ( );  Dry Needling     20     mins self-pay  Traction     -     mins 08438      Timed Treatment:   20   mins   Total Treatment:     30   mins    Kayla Alvarez PT, CIDN  [place] License: [id number]

## 2024-06-18 ENCOUNTER — TREATMENT (OUTPATIENT)
Age: 69
End: 2024-06-18
Payer: MEDICARE

## 2024-06-18 DIAGNOSIS — R29.898 DECREASED ROM OF NECK: ICD-10-CM

## 2024-06-18 DIAGNOSIS — M50.30 DDD (DEGENERATIVE DISC DISEASE), CERVICAL: ICD-10-CM

## 2024-06-18 DIAGNOSIS — M43.6 NECK STIFFNESS: Primary | ICD-10-CM

## 2024-06-18 DIAGNOSIS — M48.02 CERVICAL STENOSIS OF SPINE: ICD-10-CM

## 2024-06-18 NOTE — PROGRESS NOTES
Physical Therapy Daily Progress Note      Patient: Cathy Gutiérrez   : 1955  Referring practitioner: Deborah Rodgers MD  Date of Initial Visit: Type: THERAPY  Noted: 2024  Diagnosis/ICD-10 Code:  Neck stiffness [M43.6]  Today's Date: 2024  Patient seen for 10 sessions         Cathy Gutiérrez reports: one day of soreness after needling. No situations where I have to hold up my neck but I haven't noticed the tired feeling that I was having.  Still lie down with reading.              Objective   See Exercise, Manual, and Modality Logs for complete treatment.   Left scapular elevation, increased scalene  Elevated left first rib.        Assessment/Plan  Patient presents with improving cervical mobility.  Tightness in scalenes and first rib elevation noted but normal after treatment.  Patient reported feeling no stretch with prior traction, added 3lbs so she was able to feel mild opening but no pain.  Right shoulder improved posture with less pec tightness.  Continues with limitation on sideglides but improved rotation and flexion/extension.   Increased traction weight for cervical distraction vs only muscle    Progress strengthening /stabilization /functional activity  Isometrics if tolerated.         Timed:  Manual Therapy:    15     mins  28468;  Therapeutic Exercise:    10     mins  37259;     Neuromuscular Narda:    -    mins  85785;    Therapeutic Activity:     -     mins  30777;   Gait Training:                 -     mins  68427;   Self Care                       -     mins   17278;  Orthotic fit/train              -     mins  98873;   Dry Needling Tri          __-__ DNTRIAL;   Ultrasound:     -     mins  40521;  Ionto                          -     mins 64172 ;  Self Pay 15                  -       mins   PTSPMIN1  Self Pay 30                  -      mins  PTSPMIN2      Untimed:  Electrical Stimulation:    -     mins  97286 ( );  Mechanical Traction:    10     mins  62466;   Dry  Needling:              __-_ mins 20561, 20560    Timed Treatment:   25   mins   Total Treatment:     40   mins  Kayla Alvarez, PT, CIDN  Physical Therapist

## 2024-06-30 NOTE — PROGRESS NOTES
[facility] [place]   [address]  [place] [address]  Phone [phone]  Fax [phone]        Physical Therapy Re Certification Of Plan of Care    Patient: Cathy Gutiérrez   : 1955  Diagnosis/ICD-10 Code:  Neck stiffness [M43.6]  Referring practitioner: Deborah Rodgers MD  Date of Initial Visit: 2024  Today's Date: 2024  Patient seen for 11 sessions         Visit Diagnoses:    ICD-10-CM ICD-9-CM   1. Neck stiffness  M43.6 723.5   2. Cervical stenosis of spine  M48.02 723.0   3. DDD (degenerative disc disease), cervical  M50.30 722.4   4. Decreased ROM of neck [R29.898]  R29.898 723.8         Cathy Gutiérrez reports: left side pain today.  I can sleep now without pain.  Subjective Questionnaire: NDI:  Clinical Progress: improved  Home Program Compliance: Yes  Treatment has included: therapeutic exercise, neuromuscular re-education, manual therapy, therapeutic activity, traction, dry needling, and moist heat      Subjective       Objective        Special Questions      Additional Special Questions  denies      Postural Observations  Seated posture: good  Standing posture: good      Palpation   Left   Hypertonic in the cervical paraspinals, levator scapulae, rhomboids, suboccipitals and upper trapezius.   Tenderness of the cervical paraspinals, levator scapulae, rhomboids, suboccipitals and upper trapezius.     Right   Hypertonic in the cervical paraspinals, levator scapulae, rhomboids, suboccipitals and upper trapezius. Tenderness of the cervical paraspinals, levator scapulae, rhomboids, suboccipitals and upper trapezius.     Additional Palpation Details       Tenderness   Cervical Spine   No tenderness in the facet joint and spinous process.     Additional Tenderness Details  none    Neurological Testing     Sensation   Cervical/Thoracic   Left   Intact: light touch    Right   Intact: light touch    Reflexes   Left   Biceps (C5/C6): brisk  (3+)  Brachioradialis (C6): brisk (3+)  Triceps (C7): brisk (3+)    Right   Biceps (C5/C6): normal (2+)  Brachioradialis (C6): normal (2+)  Triceps (C7): normal (2+)    Active Range of Motion   Cervical/Thoracic Spine   Cervical    Flexion: 38 degrees   Extension: 35 degrees   Left lateral flexion: 25 degrees   Right lateral flexion: 25 degrees   Left rotation: 28 degrees   Right rotation: 50 degrees     Additional Active Range of Motion Details       Passive Range of Motion     Additional Passive Range of Motion Details  Moderate limitation in left>right cervical sidebending, mild with rotation.  OA mild limitation.  AA good.    Strength/Myotome Testing   Cervical Spine   Neck extension: 4-  Neck flexion: 3+    Left   Normal strength    Right   Normal strength    Additional Strength Details       Tests   Cervical     Left   Negative alar ligament integrity, active compression ([place]), cervical distraction, Spurling's sign and transverse ligament.     Right   Negative alar ligament integrity, active compression ([place]), cervical distraction, Spurling's sign and transverse ligament.     Additional Tests Details     Relief with cervical distraction.          Assessment/Plan  Patient demonstrates improvement in ROM, strength, and mobility.  She continues with limited left cervical rotation and sidebending with stiffness passively.  Functionally she has reported improvement in her ability to perform activities with NDI rated at 32% .  She is progressing well towards the goals set during the initial evaluation. Continued therapy is needed to address stiffness and pain limiting function.    Goal progress:  STG 4weeks MET  1.  Patient to tolerate initial exercises without increase in pain.  2.  Increase cervical rotation/SB ROM by 10 degrees.  3.  Reach overhead with tolerable symptoms to perform household tasks  LTG 8 weeks  1.  Patient to be independent with HEP MET  2.  Increase cervical rotation and SB ROM by 15  degrees.  3.  Turn neck during driving with tolerable symptoms. MET  4.  Functional testing improved 10%. Not met  5.  Decreased fatigue to allow sitting up to 1 hour.     Recommendations: Continue with recommendations HEP with manual /dry needling as needed.  Timeframe:2 weeks - 1 month 1x week  Prognosis to achieve goals: good      Timed:         Manual Therapy:    -     mins  43090;     Therapeutic Exercise:    -     mins  02348;     Neuromuscular Narda:    -    mins  19906;    Therapeutic Activity:     15     mins  27192;     Gait Training:      -     mins  50254;     Ultrasound:     -     mins  49302;    Ionto                               -    mins   27854  Self Care                       -     mins   59954  Self Pay 15  __-___  mins   PTSPMIN1  Self Pay 30  _-___  mins  PTSPMIN2   Dry Needling Tri __15__ DNTRIAL      Un-Timed:  Electrical Stimulation:    -     mins  32671 ( );  Dry Needling     -     mins self-pay  Traction     -     mins 14901  Re-Eval                           -    mins  09285      Timed Treatment:   15   mins   Total Treatment:     40   mins          PT: Kayla Alvarez PT, SHAREE     [place] License:  [id number]    Electronically signed by Kayla Alvarez PT, 07/01/24, 17:15 PM EDT    Certification Period: 7/2/2024 thru 9/29/2024  I certify that the therapy services are furnished while this patient is under my care.  The services outlined above are required by this patient, and will be reviewed every 90 days.         Physician Signature:__________________________________________________    PHYSICIAN: Deborah Rodgers MD      DATE:     Please sign and return via fax to [phone].  Thank you, [facility].

## 2024-07-02 ENCOUNTER — TREATMENT (OUTPATIENT)
Age: 69
End: 2024-07-02
Payer: MEDICARE

## 2024-07-02 DIAGNOSIS — M43.6 NECK STIFFNESS: Primary | ICD-10-CM

## 2024-07-02 DIAGNOSIS — M48.02 CERVICAL STENOSIS OF SPINE: ICD-10-CM

## 2024-07-02 DIAGNOSIS — M50.30 DDD (DEGENERATIVE DISC DISEASE), CERVICAL: ICD-10-CM

## 2024-07-02 DIAGNOSIS — R29.898 DECREASED ROM OF NECK: ICD-10-CM

## 2024-07-08 DIAGNOSIS — I10 ESSENTIAL HYPERTENSION: Chronic | ICD-10-CM

## 2024-07-08 RX ORDER — IRBESARTAN AND HYDROCHLOROTHIAZIDE 300; 12.5 MG/1; MG/1
1 TABLET, FILM COATED ORAL DAILY
Qty: 90 TABLET | Refills: 1 | Status: SHIPPED | OUTPATIENT
Start: 2024-07-08

## 2024-07-09 ENCOUNTER — TREATMENT (OUTPATIENT)
Age: 69
End: 2024-07-09
Payer: MEDICARE

## 2024-07-09 DIAGNOSIS — M43.6 NECK STIFFNESS: Primary | ICD-10-CM

## 2024-07-09 DIAGNOSIS — M48.02 CERVICAL STENOSIS OF SPINE: ICD-10-CM

## 2024-07-09 DIAGNOSIS — M50.30 DDD (DEGENERATIVE DISC DISEASE), CERVICAL: ICD-10-CM

## 2024-07-09 DIAGNOSIS — R29.898 DECREASED ROM OF NECK: ICD-10-CM

## 2024-07-09 NOTE — PROGRESS NOTES
Physical Therapy Daily Progress Note      Patient: Cathy Gutiérrez   : 1955  Referring practitioner: Deborah Rodgers MD  Date of Initial Visit: Type: THERAPY  Noted: 2024  Diagnosis/ICD-10 Code:  Neck stiffness [M43.6]  Today's Date: 2024  Patient seen for 12 sessions         Cathy Gutiérrez reports: my right arm is really aggravated from lying on it and move my daughter.              Objective   See Exercise, Manual, and Modality Logs for complete treatment.    Increased tenderness right levator scapula, rhomboids.        Assessment/Plan  Patient presents with improving cervical ROM but noted increased right cervical muscle guarding today possibly from overuse with right shoulder weakness.  Improved joint mobility and ROM overall with stretching, manual, and traction.  Discussed home traction unit and benefits when discharged if patient having good long term benefits from joint distraction.       Progress per Plan of Care           Timed:  Manual Therapy:    15     mins  81935;  Therapeutic Exercise:    10     mins  48425;     Neuromuscular Narda:    -    mins  60643;    Therapeutic Activity:     13     mins  44759;   Gait Training:                 -     mins  01703;   Self Care                       -     mins   30630;  Orthotic fit/train              -     mins  52578;   Dry Needling Tri          __-__ DNTRIAL;   Ultrasound:     -     mins  28843;  Ionto                          -     mins 13063 ;  Self Pay 15                  -       mins   PTSPMIN1  Self Pay 30                  -      mins  PTSPMIN2      Untimed:  Electrical Stimulation:    -     mins  21341 ( );  Mechanical Traction:    12     mins  79301;   Dry Needling:              __-_ mins 65233, 43840    Timed Treatment:   38   mins   Total Treatment:     60   mins  Kayla Alvarez PT, CIDN  Physical Therapist

## 2024-07-31 ENCOUNTER — TREATMENT (OUTPATIENT)
Age: 69
End: 2024-07-31

## 2024-07-31 DIAGNOSIS — R29.898 DECREASED ROM OF NECK: ICD-10-CM

## 2024-07-31 DIAGNOSIS — M43.6 NECK STIFFNESS: Primary | ICD-10-CM

## 2024-07-31 DIAGNOSIS — M48.02 CERVICAL STENOSIS OF SPINE: ICD-10-CM

## 2024-07-31 DIAGNOSIS — M50.30 DDD (DEGENERATIVE DISC DISEASE), CERVICAL: ICD-10-CM

## 2024-07-31 NOTE — PROGRESS NOTES
[facility] [place]   [address]  [place] [address]  Phone [phone]  Fax [phone]          Physical Therapy Daily Treatment Note      Patient: Cathy Gutiérrez   : 1955  Referring practitioner: Deborah Rodgers MD  Date of Initial Visit: Type: THERAPY  Noted: 2024  Today's Date: 2024  Patient seen for 13 sessions       Visit Diagnoses:    ICD-10-CM ICD-9-CM   1. Neck stiffness  M43.6 723.5   2. Cervical stenosis of spine  M48.02 723.0   3. DDD (degenerative disc disease), cervical  M50.30 722.4   4. Decreased ROM of neck [R29.898]  R29.898 723.8       Subjective   Getting ready to drive for 8 hours and I want to make sure my neck feels better.  The right side has been really tight.  Doesn't hurt with my ipad use but after especially on the right side.    Objective          Palpation   Left   Hypertonic in the cervical paraspinals, levator scapulae, suboccipitals and upper trapezius.   Tenderness of the cervical paraspinals, levator scapulae, rhomboids, sternocleidomastoid, suboccipitals and upper trapezius.     Right   Hypertonic in the cervical paraspinals, levator scapulae, pectoralis major, rhomboids, suboccipitals and upper trapezius. Tenderness of the biceps, cervical paraspinals, levator scapulae, pectoralis major, rhomboids, sternocleidomastoid, suboccipitals and upper trapezius.       See Exercise, Manual, and Modality Logs for complete treatment.          Assessment/Plan  Using threading and direct techniques, treatment well tolerated without adverse response. Clean needle technique and gloves used at all times. Precautions utilized for lung fields and neurovascular structures. No adverse response noted. Manual palpation and assessment performed before, during and after needling.   After treatment patient presents with increased ROM and decreased pain/tenderness. Plan to continue needling as needed  and instructed to apply heat to  sore areas tonight.         Timed:         Manual Therapy:    -     mins  74868;     Therapeutic Exercise:    -     mins  57152;     Neuromuscular Narda:    -    mins  44760;    Therapeutic Activity:     -     mins  84704;     Gait Training:      -     mins  82235;     Ultrasound:     -     mins  55821;    Ionto                               -    mins   88350  Self Care                       -     mins   52578  Orthotic Fit/Train           -     mins 86244  Dry Needling Tri __-__ DNTRIAL    Un-Timed:  Electrical Stimulation:    -     mins  23800 ( );  Dry Needling     20     mins self-pay  Traction     -     mins 67705      Timed Treatment:   21   mins   Total Treatment:     30   mins    Kayla Alvarez PT, CIDN  [place] License: [id number]

## 2024-08-14 RX ORDER — ESTRADIOL 0.1 MG/D
FILM, EXTENDED RELEASE TRANSDERMAL
Qty: 24 PATCH | Refills: 0 | Status: SHIPPED | OUTPATIENT
Start: 2024-08-14

## 2024-08-22 NOTE — PROGRESS NOTES
[facility] [place]   [address]  [place] [address]  Phone [phone]  Fax [phone]        Physical Therapy Re Certification Of Plan of Care    Patient: Cathy Gutiérrez   : 1955  Diagnosis/ICD-10 Code:  Neck stiffness [M43.6]  Referring practitioner: Deborah Rodgers MD  Date of Initial Visit: 2024  Today's Date: 2024  Patient seen for 14 sessions         Visit Diagnoses:    ICD-10-CM ICD-9-CM   1. Neck stiffness  M43.6 723.5   2. Cervical stenosis of spine  M48.02 723.0   3. Decreased ROM of neck [R29.898]  R29.898 723.8         Cathy Gutiérrez reports: muscles better, neck better. My right shoulder pain comes and goes and I am going to see about my rotator cuff.   Subjective Questionnaire: NDI:   Clinical Progress: improved  Home Program Compliance: Yes  Treatment has included: therapeutic exercise, neuromuscular re-education, manual therapy, and moist heat      Subjective       Objective          Postural Observations  Seated posture: good  Standing posture: good      Palpation   Left   Hypertonic in the levator scapulae and rhomboids.   Tenderness of the levator scapulae and rhomboids.     Right   Hypertonic in the levator scapulae and rhomboids. Tenderness of the levator scapulae and rhomboids.     Additional Palpation Details       Tenderness   Cervical Spine   Tenderness in the left 1st rib.   No tenderness in the facet joint and spinous process.     Additional Tenderness Details       Neurological Testing     Sensation   Cervical/Thoracic   Left   Intact: light touch    Right   Intact: light touch    Reflexes   Left   Biceps (C5/C6): brisk (3+)  Brachioradialis (C6): brisk (3+)  Triceps (C7): brisk (3+)    Right   Biceps (C5/C6): normal (2+)  Brachioradialis (C6): normal (2+)  Triceps (C7): normal (2+)    Active Range of Motion   Cervical/Thoracic Spine   Cervical    Flexion: WFL  Extension: 15 degrees   Left lateral flexion: 25  degrees   Right lateral flexion: 25 degrees   Left rotation: 30 degrees   Right rotation: 50 degrees     Additional Active Range of Motion Details   No pain with any ROM today. (Grossly measured)    Passive Range of Motion     Additional Passive Range of Motion Details  Moderate limitation in left>right cervical sidebending, none significant with rotation  PA glides upper thoracic and left rotation moderately limited mobility.  OA WNL  AA WNL    Strength/Myotome Testing     Additional Strength Details       Tests   Cervical     Left   Negative alar ligament integrity, active compression ([place]), cervical distraction, Spurling's sign and transverse ligament.     Right   Negative alar ligament integrity, active compression ([place]), cervical distraction, Spurling's sign and transverse ligament.     Additional Tests Details     Relief with cervical distraction.          Assessment/Plan  Patient demonstrates improvement in cervical tenderness, tightness, and mobility.  She has stiffness in the left first rib and upper thoracic spine but has much more mobility in the cervical spine with most limitation in sidebending.   She is progressing well towards the goals set during the initial evaluation. Continued therapy is needed to address stiffness and pain.    Goal progress:  LTG 8 weeks  1.  Patient to be independent with HEP MET  2.  Increase cervical rotation and SB ROM by 15 degrees.  3.  Turn neck during driving with tolerable symptoms. MET  4.  Functional testing improved 10%. Not met  5.  Decreased fatigue to allow sitting up to 1 hour. (Drove extended without pain) MET     Recommendations: Continue with recommendations dry needling, HEP, manual as needed.  Timeframe: 2 weeks-1 month  Prognosis to achieve goals: good      Timed:         Manual Therapy:    30     mins  22777;     Therapeutic Exercise:    -     mins  84313;     Neuromuscular Narda:    -    mins  92249;    Therapeutic Activity:     10     mins  80894;      Gait Training:      -     mins  97709;     Ultrasound:     -     mins  63845;    Ionto                               -    mins   98853  Self Care                       -     mins   52250  Self Pay 15  __-___  mins   PTSPMIN1  Self Pay 30  _-___  mins  PTSPMIN2   Dry Needling Tri __-__ DNTRIAL      Un-Timed:  Electrical Stimulation:    -     mins  91181 ( );  Dry Needling     -     mins self-pay  Traction     -     mins 45513  Re-Eval                           -    mins  86580      Timed Treatment:   40   mins   Total Treatment:     60   mins          PT: Kayla Alvarez PT, RAGHAVN     [place] License:  [id number]    Electronically signed by Kayla Alvarez PT, 08/23/24, 4:50 PM EDT    Certification Period: 8/23/2024 thru 11/20/2024  I certify that the therapy services are furnished while this patient is under my care.  The services outlined above are required by this patient, and will be reviewed every 90 days.         Physician Signature:__________________________________________________    PHYSICIAN:       DATE:     Please sign and return via fax to [phone].  Thank you, [facility].

## 2024-08-23 ENCOUNTER — TREATMENT (OUTPATIENT)
Age: 69
End: 2024-08-23
Payer: MEDICARE

## 2024-08-23 DIAGNOSIS — M48.02 CERVICAL STENOSIS OF SPINE: ICD-10-CM

## 2024-08-23 DIAGNOSIS — M43.6 NECK STIFFNESS: Primary | ICD-10-CM

## 2024-08-23 DIAGNOSIS — R29.898 DECREASED ROM OF NECK: ICD-10-CM

## 2024-08-28 ENCOUNTER — TREATMENT (OUTPATIENT)
Age: 69
End: 2024-08-28

## 2024-08-28 DIAGNOSIS — R29.898 DECREASED ROM OF NECK: ICD-10-CM

## 2024-08-28 DIAGNOSIS — M50.30 DDD (DEGENERATIVE DISC DISEASE), CERVICAL: ICD-10-CM

## 2024-08-28 DIAGNOSIS — M43.6 NECK STIFFNESS: Primary | ICD-10-CM

## 2024-08-28 DIAGNOSIS — M48.02 CERVICAL STENOSIS OF SPINE: ICD-10-CM

## 2024-08-28 NOTE — PROGRESS NOTES
[facility] [place]   [address]  [place] [address]  Phone [phone]  Fax [phone]          Physical Therapy Daily Treatment Note      Patient: Cathy Gutiérrez   : 1955  Referring practitioner: No ref. provider found  Date of Initial Visit: Type: THERAPY  Noted: 2024  Today's Date: 2024  Patient seen for 15 sessions       Visit Diagnoses:    ICD-10-CM ICD-9-CM   1. Neck stiffness  M43.6 723.5   2. Cervical stenosis of spine  M48.02 723.0   3. Decreased ROM of neck [R29.898]  R29.898 723.8   4. DDD (degenerative disc disease), cervical  M50.30 722.4       Subjective   Muscle stiffness today.  My right shoulder has been bothering me again.    Objective   See Exercise, Manual, and Modality Logs for complete treatment.          Assessment/Plan  Using threading and direct techniques, treatment well tolerated without adverse response. Clean needle technique and gloves used at all times. Precautions utilized for lung fields and neurovascular structures. No adverse response noted. Manual palpation and assessment performed before, during and after needling.   After treatment patient presents with increased ROM and decreased pain/tenderness. Plan to continue needling as needed, patient to return to orthopedic for right shoulder and will continue HEP for her neck. Return as needed.         Timed:         Manual Therapy:    -     mins  98180;     Therapeutic Exercise:    -     mins  19590;     Neuromuscular Narda:    -    mins  21011;    Therapeutic Activity:     -     mins  65629;     Gait Training:      -     mins  12158;     Ultrasound:     -     mins  92574;    Ionto                               -    mins   53844  Self Care                       -     mins   47993  Orthotic Fit/Train           -     mins 42980  Dry Needling Tri __-__ DNTRIAL    Un-Timed:  Electrical Stimulation:    -     mins  32873 ( );  Dry Needling     20     mins  self-pay  Traction     -     mins 36130      Timed Treatment:   20   mins   Total Treatment:     40   mins    Kayla Alvarez PT, CIDN  [place] License: [id number]

## 2024-09-19 ENCOUNTER — OFFICE VISIT (OUTPATIENT)
Dept: INTERNAL MEDICINE | Facility: CLINIC | Age: 69
End: 2024-09-19
Payer: MEDICARE

## 2024-09-19 VITALS
WEIGHT: 155 LBS | HEART RATE: 78 BPM | BODY MASS INDEX: 24.33 KG/M2 | HEIGHT: 67 IN | DIASTOLIC BLOOD PRESSURE: 62 MMHG | SYSTOLIC BLOOD PRESSURE: 132 MMHG

## 2024-09-19 DIAGNOSIS — J40 BRONCHITIS: Primary | ICD-10-CM

## 2024-09-19 PROCEDURE — 99213 OFFICE O/P EST LOW 20 MIN: CPT | Performed by: INTERNAL MEDICINE

## 2024-09-19 PROCEDURE — 3075F SYST BP GE 130 - 139MM HG: CPT | Performed by: INTERNAL MEDICINE

## 2024-09-19 PROCEDURE — 3078F DIAST BP <80 MM HG: CPT | Performed by: INTERNAL MEDICINE

## 2024-09-19 PROCEDURE — 1126F AMNT PAIN NOTED NONE PRSNT: CPT | Performed by: INTERNAL MEDICINE

## 2024-09-19 RX ORDER — AZITHROMYCIN 250 MG/1
TABLET, FILM COATED ORAL
Qty: 6 TABLET | Refills: 0 | Status: SHIPPED | OUTPATIENT
Start: 2024-09-19

## 2024-09-24 RX ORDER — METHYLPREDNISOLONE 4 MG
TABLET, DOSE PACK ORAL
Qty: 1 EACH | Refills: 0 | Status: SHIPPED | OUTPATIENT
Start: 2024-09-24

## 2024-10-10 ENCOUNTER — OFFICE VISIT (OUTPATIENT)
Dept: INTERNAL MEDICINE | Facility: CLINIC | Age: 69
End: 2024-10-10
Payer: MEDICARE

## 2024-10-10 VITALS
SYSTOLIC BLOOD PRESSURE: 126 MMHG | WEIGHT: 155 LBS | BODY MASS INDEX: 24.33 KG/M2 | DIASTOLIC BLOOD PRESSURE: 66 MMHG | HEIGHT: 67 IN | HEART RATE: 78 BPM

## 2024-10-10 DIAGNOSIS — Z23 NEED FOR INFLUENZA VACCINATION: ICD-10-CM

## 2024-10-10 DIAGNOSIS — I10 ESSENTIAL HYPERTENSION: Chronic | ICD-10-CM

## 2024-10-10 DIAGNOSIS — F31.9 BIPOLAR 1 DISORDER: Chronic | ICD-10-CM

## 2024-10-10 DIAGNOSIS — J40 BRONCHITIS: Primary | ICD-10-CM

## 2024-10-10 PROCEDURE — G0008 ADMIN INFLUENZA VIRUS VAC: HCPCS | Performed by: INTERNAL MEDICINE

## 2024-10-10 PROCEDURE — 1126F AMNT PAIN NOTED NONE PRSNT: CPT | Performed by: INTERNAL MEDICINE

## 2024-10-10 PROCEDURE — 3078F DIAST BP <80 MM HG: CPT | Performed by: INTERNAL MEDICINE

## 2024-10-10 PROCEDURE — 1160F RVW MEDS BY RX/DR IN RCRD: CPT | Performed by: INTERNAL MEDICINE

## 2024-10-10 PROCEDURE — 1159F MED LIST DOCD IN RCRD: CPT | Performed by: INTERNAL MEDICINE

## 2024-10-10 PROCEDURE — 90662 IIV NO PRSV INCREASED AG IM: CPT | Performed by: INTERNAL MEDICINE

## 2024-10-10 PROCEDURE — 99214 OFFICE O/P EST MOD 30 MIN: CPT | Performed by: INTERNAL MEDICINE

## 2024-10-10 PROCEDURE — 3074F SYST BP LT 130 MM HG: CPT | Performed by: INTERNAL MEDICINE

## 2024-10-10 NOTE — PROGRESS NOTES
"Chief Complaint  Hypertension    Subjective        Cathy Gutiérrez presents to [facility]  History of Present Illness here for reg f/u- has been working on her weight. Cough is still there but much better. Sometimes feels it is deep in her chest, no SOB.  Increased the Lat[place] has made big difference- she feels more calm.     Objective   Vital Signs:  /66   Pulse 78   Ht 170.2 cm (67\")   Wt 70.3 kg (155 lb)   BMI 24.28 kg/m²   Estimated body mass index is 24.28 kg/m² as calculated from the following:    Height as of this encounter: 170.2 cm (67\").    Weight as of this encounter: 70.3 kg (155 lb).    BMI is within normal parameters. No other follow-up for BMI required.      Physical Exam  Constitutional:       Appearance: Normal appearance.   Cardiovascular:      Rate and Rhythm: Normal rate.   Pulmonary:      Effort: Pulmonary effort is normal.      Breath sounds: Normal breath sounds. No wheezing.   Psychiatric:         Mood and Affect: Mood normal.         Thought Content: Thought content normal.        Result Review :                Assessment and Plan   Diagnoses and all orders for this visit:    1. Bronchitis (Primary)  Comments:  check CXr since could have  been over a month.  Orders:  -     XR Chest PA & Lateral    2. Need for influenza vaccination  -     Fluzone High-Dose 65+yrs    3. Bipolar 1 disorder  Comments:  seems well compensated currently    4. Essential hypertension  Comments:  at goal, no change.             Follow Up   Return in about 6 months (around 4/10/2025) for Medicare Wellness, Lab Before FUP.  Patient was given instructions and counseling regarding her condition or for health maintenance advice. Please see specific information pulled into the AVS if appropriate.           "

## 2024-11-01 RX ORDER — ESTRADIOL 0.1 MG/D
FILM, EXTENDED RELEASE TRANSDERMAL
Qty: 24 PATCH | Refills: 0 | OUTPATIENT
Start: 2024-11-01

## 2024-11-04 RX ORDER — ESTRADIOL 0.1 MG/D
1 FILM, EXTENDED RELEASE TRANSDERMAL 2 TIMES WEEKLY
Qty: 24 PATCH | Refills: 0 | Status: SHIPPED | OUTPATIENT
Start: 2024-11-04

## 2024-11-18 ENCOUNTER — TELEPHONE (OUTPATIENT)
Dept: GASTROENTEROLOGY | Facility: CLINIC | Age: 69
End: 2024-11-18
Payer: MEDICARE

## 2024-11-18 RX ORDER — PANTOPRAZOLE SODIUM 40 MG/1
40 TABLET, DELAYED RELEASE ORAL DAILY
Qty: 90 TABLET | Refills: 2 | Status: SHIPPED | OUTPATIENT
Start: 2024-11-18

## 2024-11-18 NOTE — TELEPHONE ENCOUNTER
I take 40 mg of pantoprazole and have no refills left on my prescription. Dr. Kelley has prescribed  These for me, but as a convenience, I have been having my  do the refills. He is retired and can’t do that anymore. Could you please send the prescription to the [facility] [place] and [place]? Thank you!  Eileen Gutiérrez       Pantoprazole 40mg po daily #90 with 2 refills sent to pt's [facility] and message sent to pt thru kay advising of this.

## 2024-12-20 ENCOUNTER — OFFICE VISIT (OUTPATIENT)
Dept: SLEEP MEDICINE | Facility: HOSPITAL | Age: 69
End: 2024-12-20
Payer: MEDICARE

## 2024-12-20 ENCOUNTER — TELEPHONE (OUTPATIENT)
Dept: SLEEP MEDICINE | Facility: HOSPITAL | Age: 69
End: 2024-12-20
Payer: MEDICARE

## 2024-12-20 VITALS — WEIGHT: 158 LBS | BODY MASS INDEX: 24.8 KG/M2 | HEIGHT: 67 IN | HEART RATE: 70 BPM | OXYGEN SATURATION: 95 %

## 2024-12-20 DIAGNOSIS — G47.8 NON-RESTORATIVE SLEEP: ICD-10-CM

## 2024-12-20 DIAGNOSIS — R06.83 SNORING: ICD-10-CM

## 2024-12-20 DIAGNOSIS — T88.8XXA MALFUNCTION OF CONTINUOUS POSITIVE AIRWAY PRESSURE (CPAP) OR BILEVEL POSITIVE AIRWAY PRESSURE (BPAP) MACHINE, INITIAL ENCOUNTER: ICD-10-CM

## 2024-12-20 DIAGNOSIS — G47.33 OBSTRUCTIVE SLEEP APNEA: Primary | ICD-10-CM

## 2024-12-20 DIAGNOSIS — G47.10 HYPERSOMNIA: ICD-10-CM

## 2024-12-20 PROCEDURE — G0463 HOSPITAL OUTPT CLINIC VISIT: HCPCS

## 2024-12-20 NOTE — PROGRESS NOTES
[facility] New Patient[facility]Consultation       Reason for Consultation: zara      Patient Care Team:  Deborah Rodgers MD as PCP - General (Internal Medicine)  Royer Jones MD (Psychiatry)  Myrtle Anthony MD as Consulting Physician (Endocrinology)  Helga Vazquez MD as Consulting Physician (Sleep Medicine)      History of present illness:  Thank you for asking me to see your patient.  The patient is a 69 y.o. female presents today to reestablish care for ZARA.  Split study was done in March 2019 which showed AHI of 18.1 and patient was started on BiPAP 20/16 cm H2O.  She experienced and pressure intolerance.  Settings were changed to auto settings.  Patient reported she was tolerating auto BiPAP well in July 2019.  No follow-up since then.  Patient reports sleeping see 30 minutes sleeps 9 to 10 hours 0-1 naps a day.  Reports snoring witnessed apneas leg jerking at night Teeth grinding nocturia more sleepy when she increase her sleep time.  BMI 24.7.  Overdue for new machine.  Over 5 years old.    Medical Conditions (PMH):   ADHD  Anxiety  Depression  Hypertension  Restless legs  Acid reflux    Social history:  Do you drive a commercial vehicle:  No   Shift work:  No   Tobacco use:  No   Alcohol use: 0 per week  Caffeinated drinks: 1 per day  Occupation: Unanswered    Family History (parents and siblings) (pertaining to sleep medicine):  ZARA  Restless legs  Thyroid disorder  Insomnia  Obesity    Allergies:  Latex       Current Outpatient Medications:     amphetamine-dextroamphetamine (ADDERALL) 10 MG tablet, Take 1 tablet by mouth Daily., Disp: 30 tablet, Rfl: 0    buPROPion (WELLBUTRIN) 100 MG tablet, , Disp: , Rfl:     estradiol (VIVELLE-DOT) 0.1 MG/24HR patch, Place 1 patch on the skin as directed by provider 2 (Two) Times a Week., Disp: 24 patch, Rfl: 0    Estradiol 10 MCG insert, Insert 1 Insert into the vagina 2 (Two) Times a Week., Disp: , Rfl:     hydrOXYzine (ATARAX) 10 MG tablet, TAKE 1 TABLET BY  "MOUTH AT BEDTIME 1 TO 2 HOURS BEFORE SLEEP, Disp: , Rfl:     irbesartan-hydrochlorothiazide (AVALIDE) 300-12.5 MG tablet, Take 1 tablet by mouth Daily., Disp: 90 tablet, Rfl: 1    ketoconazole (NIZORAL) 2 % shampoo, Apply  topically to the appropriate area as directed 3 (Three) Times a Week., Disp: 120 mL, Rfl: 1    lamoTRIgine  MG tablet sustained-release 24 hour, Take 1 tablet by mouth 2 (two) times a day., Disp: 180 tablet, Rfl: 3    levothyroxine (SYNTHROID, LEVOTHROID) 112 MCG tablet, Take 1 tablet by mouth Every Morning Before Breakfast., Disp: 90 tablet, Rfl: 3    liothyronine (CYTOMEL) 5 MCG tablet, Take 0.5 tablets by mouth Daily., Disp: , Rfl:     LORazepam (ATIVAN) 0.5 MG tablet, Take 1 tablet by mouth Daily As Needed for anxiety, Disp: 30 tablet, Rfl: 5    lurasidone (LATUDA) 40 MG tablet tablet, Take 1 tablet by mouth Daily., Disp: , Rfl:     meloxicam (MOBIC) 15 MG tablet, 1 PO Daily with food., Disp: 30 tablet, Rfl: 0    ondansetron (ZOFRAN) 8 MG tablet, Take 1 tablet by mouth 3 (Three) Times a Day As Needed., Disp: , Rfl:     pantoprazole (PROTONIX) 40 MG EC tablet, Take 1 tablet by mouth Daily. For further refill, pt needs to make appt., Disp: 90 tablet, Rfl: 2    Vibegron (Gemtesa) 75 MG tablet, Take 1 tablet by mouth Daily. Indications: Overactive Bladder, Disp: , Rfl:     zolpidem (AMBIEN) 10 MG tablet, Take 1 tablet by mouth At Night As Needed., Disp: 30 tablet, Rfl: 5    Vital Signs:    Vitals:    12/20/24 1046   Pulse: 70   SpO2: 95%   Weight: 71.7 kg (158 lb)   Height: 170.2 cm (67\")      Body mass index is 24.75 kg/m².  Neck Circumference: 14 inches      REVIEW OF SYSTEMS:  Pertinent positive symptoms are:  Snoring  Witnessed apnea  [place] Sleepiness Scale of Total score: 10   Fatigue  Anxiety  Depression  Frequent urination      Physical exam:  Vitals:    12/20/24 1046   Pulse: 70   SpO2: 95%   Weight: 71.7 kg (158 lb)   Height: 170.2 cm (67\")    Body mass index is 24.75 kg/m². Neck " Circumference: 14 inches  HEENT: Head is atraumatic, normocephalic  Eyes: pupils are round equal and reacting to light and accommodation, conjunctiva normal  Throat: tongue normal  NECK:Neck Circumference: 14 inches  RESPIRATORY SYSTEM: Regular respirations  CARDIOVASULAR SYSTEM: Regular rate  EXTREMITES: No cyanosis, clubbing  NEUROLOGICAL SYSTEM: Oriented x 3, no gross motor defects, gait normal      Impression:  1. Obstructive sleep apnea    2. Hypersomnia    3. Non-restorative sleep    4. Snoring    5. Malfunction of continuous positive airway pressure (CPAP) or bilevel positive airway pressure (BPAP) machine, initial encounter        Plan:    Office note(s) from care team reviewed. Office note(s) reviewed: 2019 sleep medicine notes Dr. Vazquez    Labs/ Test Results Reviewed:  TSH          5/28/2024    13:33 7/22/2024    14:28 8/28/2024    13:32   TSH   TSH 6.17     8.26     1.30          Details          This result is from an external source.                         ASSESSMENT AND PLAN:   Obstructive Sleep Apnea: patient's symptoms and physical examination are concerning for possible sleep apnea.   I discussed the signs, symptoms, and pathophysiology of sleep apnea with this patient.  I also discussed the potential complications of untreated sleep apnea including but not limited to resistant hypertension, insulin resistance, pulmonary hypertension, atrial fibrillation, heart attack, stroke, nonrestorative sleep with hypersomnia which can increase risk for motor vehicle accidents, etc.   Overdue for new machine; has been 5 years since she's had a machine from insurance.  Snoring: snoring is the sound created by turbulent airflow vibrating upper airway soft tissue.  I have also discussed factors affecting snoring including sleep deprivation, sleeping on the back and alcohol ingestion. To minimize snoring, patient is advised to have adequate sleep, sleep on their side, and avoid alcohol and sedative medications  around bedtime.   Excessive daytime sleepiness:  [place] Sleepiness Scale of Total score: 10.  There are many causes of excessive daytime sleepiness.  Rule out sleep apnea as a contributing factor, as above.  Do not drive, operate heavy machinery, or do activities that require high concentration if feeling tired/drowsy.  Normal body weight: Body mass index is 24.75 kg/m².. Patients who are overweight or obese are at increased risk of sleep apnea/ sleep disordered breathing. Weight reduction and healthy lifestyle are encouraged in overweight/ obese patients as part of a comprehensive approach to sleep apnea treatment.    Order placed today for new auto BIPAP     I have also discussed with the patient the following  Sleep hygiene: try to maintain a regular bed time and wake time, avoid watching TV/ using electronic devices in bed (including cell phones), limit caffeinated and alcoholic beverages before bed, try to maintain a cool and quiet sleep environment, avoid daytime naps  Adequate amount of sleep: most people need around 7 to 8 hours of sleep each night    Patient's questions were answered.  Return to clinic in 2 months for follow-up with new machine or sooner if needed.    Thank you for allowing me to participate in your patient's care.    Helga Vazquez MD  Sleep Medicine  12/20/24  11:03 EST

## 2025-01-21 ENCOUNTER — TELEPHONE (OUTPATIENT)
Dept: SLEEP MEDICINE | Facility: HOSPITAL | Age: 70
End: 2025-01-21
Payer: MEDICARE

## 2025-02-13 RX ORDER — ESTRADIOL 0.1 MG/D
FILM, EXTENDED RELEASE TRANSDERMAL
Qty: 24 PATCH | Refills: 2 | Status: SHIPPED | OUTPATIENT
Start: 2025-02-13

## 2025-04-10 DIAGNOSIS — I10 ESSENTIAL HYPERTENSION: Chronic | ICD-10-CM

## 2025-04-10 RX ORDER — IRBESARTAN AND HYDROCHLOROTHIAZIDE 300; 12.5 MG/1; MG/1
1 TABLET, FILM COATED ORAL DAILY
Qty: 90 TABLET | Refills: 0 | Status: SHIPPED | OUTPATIENT
Start: 2025-04-10

## 2025-04-14 DIAGNOSIS — E78.49 OTHER HYPERLIPIDEMIA: ICD-10-CM

## 2025-04-14 DIAGNOSIS — I10 ESSENTIAL HYPERTENSION: Primary | ICD-10-CM

## 2025-04-16 ENCOUNTER — OFFICE VISIT (OUTPATIENT)
Dept: INTERNAL MEDICINE | Facility: CLINIC | Age: 70
End: 2025-04-16
Payer: MEDICARE

## 2025-04-16 VITALS
HEART RATE: 76 BPM | WEIGHT: 159 LBS | SYSTOLIC BLOOD PRESSURE: 120 MMHG | DIASTOLIC BLOOD PRESSURE: 72 MMHG | BODY MASS INDEX: 24.96 KG/M2 | HEIGHT: 67 IN

## 2025-04-16 DIAGNOSIS — R92.343 EXTREMELY DENSE TISSUE OF BOTH BREASTS ON MAMMOGRAPHY: ICD-10-CM

## 2025-04-16 DIAGNOSIS — Z12.39 ENCOUNTER FOR BREAST CANCER SCREENING USING NON-MAMMOGRAM MODALITY: ICD-10-CM

## 2025-04-16 DIAGNOSIS — I10 ESSENTIAL HYPERTENSION: Chronic | ICD-10-CM

## 2025-04-16 DIAGNOSIS — Z00.00 MEDICARE ANNUAL WELLNESS VISIT, SUBSEQUENT: Primary | ICD-10-CM

## 2025-04-16 DIAGNOSIS — R92.8 OTHER ABNORMAL AND INCONCLUSIVE FINDINGS ON DIAGNOSTIC IMAGING OF BREAST: ICD-10-CM

## 2025-04-16 DIAGNOSIS — R13.10 DYSPHAGIA, UNSPECIFIED TYPE: ICD-10-CM

## 2025-04-16 DIAGNOSIS — C73 PAPILLARY THYROID CARCINOMA: ICD-10-CM

## 2025-04-16 LAB
ALBUMIN SERPL-MCNC: 4.1 G/DL (ref 3.5–5.2)
ALBUMIN/GLOB SERPL: 1.5 G/DL
ALP SERPL-CCNC: 83 U/L (ref 39–117)
ALT SERPL-CCNC: 197 U/L (ref 1–33)
AST SERPL-CCNC: 135 U/L (ref 1–32)
BILIRUB SERPL-MCNC: 0.4 MG/DL (ref 0–1.2)
BUN SERPL-MCNC: 15 MG/DL (ref 8–23)
BUN/CREAT SERPL: 18.1 (ref 7–25)
CALCIUM SERPL-MCNC: 9.2 MG/DL (ref 8.6–10.5)
CHLORIDE SERPL-SCNC: 96 MMOL/L (ref 98–107)
CHOLEST SERPL-MCNC: 245 MG/DL (ref 0–200)
CO2 SERPL-SCNC: 28 MMOL/L (ref 22–29)
CREAT SERPL-MCNC: 0.83 MG/DL (ref 0.57–1)
EGFRCR SERPLBLD CKD-EPI 2021: 75.9 ML/MIN/1.73
ERYTHROCYTE [DISTWIDTH] IN BLOOD BY AUTOMATED COUNT: 12.7 % (ref 12.3–15.4)
GLOBULIN SER CALC-MCNC: 2.7 GM/DL
GLUCOSE SERPL-MCNC: 89 MG/DL (ref 65–99)
HCT VFR BLD AUTO: 42.3 % (ref 34–46.6)
HDLC SERPL-MCNC: 60 MG/DL (ref 40–60)
HGB BLD-MCNC: 14.2 G/DL (ref 12–15.9)
LDLC SERPL CALC-MCNC: 158 MG/DL (ref 0–100)
MCH RBC QN AUTO: 30.1 PG (ref 26.6–33)
MCHC RBC AUTO-ENTMCNC: 33.6 G/DL (ref 31.5–35.7)
MCV RBC AUTO: 89.6 FL (ref 79–97)
PLATELET # BLD AUTO: 269 10*3/MM3 (ref 140–450)
POTASSIUM SERPL-SCNC: 4.4 MMOL/L (ref 3.5–5.2)
PROT SERPL-MCNC: 6.8 G/DL (ref 6–8.5)
RBC # BLD AUTO: 4.72 10*6/MM3 (ref 3.77–5.28)
SODIUM SERPL-SCNC: 134 MMOL/L (ref 136–145)
TRIGL SERPL-MCNC: 150 MG/DL (ref 0–150)
VLDLC SERPL CALC-MCNC: 27 MG/DL (ref 5–40)
WBC # BLD AUTO: 4.28 10*3/MM3 (ref 3.4–10.8)

## 2025-04-16 PROCEDURE — G0439 PPPS, SUBSEQ VISIT: HCPCS | Performed by: INTERNAL MEDICINE

## 2025-04-16 PROCEDURE — 3074F SYST BP LT 130 MM HG: CPT | Performed by: INTERNAL MEDICINE

## 2025-04-16 PROCEDURE — 1159F MED LIST DOCD IN RCRD: CPT | Performed by: INTERNAL MEDICINE

## 2025-04-16 PROCEDURE — 3078F DIAST BP <80 MM HG: CPT | Performed by: INTERNAL MEDICINE

## 2025-04-16 PROCEDURE — 1160F RVW MEDS BY RX/DR IN RCRD: CPT | Performed by: INTERNAL MEDICINE

## 2025-04-16 PROCEDURE — 1126F AMNT PAIN NOTED NONE PRSNT: CPT | Performed by: INTERNAL MEDICINE

## 2025-04-16 PROCEDURE — 1170F FXNL STATUS ASSESSED: CPT | Performed by: INTERNAL MEDICINE

## 2025-04-16 RX ORDER — MELOXICAM 15 MG/1
TABLET ORAL
Qty: 30 TABLET | Refills: 0 | OUTPATIENT
Start: 2025-04-16

## 2025-04-16 RX ORDER — ONDANSETRON 8 MG/1
8 TABLET, FILM COATED ORAL 3 TIMES DAILY PRN
Qty: 21 TABLET | Refills: 0 | Status: SHIPPED | OUTPATIENT
Start: 2025-04-16

## 2025-04-16 RX ORDER — IRBESARTAN AND HYDROCHLOROTHIAZIDE 300; 12.5 MG/1; MG/1
1 TABLET, FILM COATED ORAL DAILY
Qty: 90 TABLET | Refills: 0 | Status: SHIPPED | OUTPATIENT
Start: 2025-04-16

## 2025-04-16 RX ORDER — LEVOTHYROXINE SODIUM 150 UG/1
150 TABLET ORAL
Qty: 90 TABLET | Refills: 1 | OUTPATIENT
Start: 2025-04-16

## 2025-04-16 RX ORDER — HYDROXYZINE HYDROCHLORIDE 10 MG/1
20 TABLET, FILM COATED ORAL NIGHTLY
Qty: 180 TABLET | Refills: 0 | Status: SHIPPED | OUTPATIENT
Start: 2025-04-16

## 2025-04-16 NOTE — TELEPHONE ENCOUNTER
Please advise   [phone]    I tried calling pt to confirm RF request no answer. I left VM advised pt to call office back.     Last FD 09/28/2023  Last OV 11/02/2023  Next OV N/A

## 2025-04-16 NOTE — PROGRESS NOTES
Subjective   The ABCs of the Annual Wellness Visit  Medicare Wellness Visit      Cathy Gutiérrez is a 70 y.o. patient who presents for a Medicare Wellness Visit.    The following portions of the patient's history were reviewed and   updated as appropriate: allergies, current medications, past family history, past medical history, past social history, past surgical history, and problem list.    Compared to one year ago, the patient's physical   health is worse.  Compared to one year ago, the patient's mental   health is the same.    Recent Hospitalizations:  She was not admitted to the hospital during the last year.     Current Medical Providers:  Patient Care Team:  Deborah Rodgers MD as PCP - General (Internal Medicine)  Royer Jones MD (Psychiatry)  Myrtle Anthony MD as Consulting Physician (Endocrinology)    Outpatient Medications Prior to Visit   Medication Sig Dispense Refill    amphetamine-dextroamphetamine (ADDERALL) 10 MG tablet Take 1 tablet by mouth Daily. 30 tablet 0    buPROPion (WELLBUTRIN) 100 MG tablet       estradiol (VIVELLE-DOT) 0.1 MG/24HR patch PLACE 1 PATCH ON THE SKIN AS DIRECTED BY PROVIDER TWICE WEEKLY 24 patch 2    hydrOXYzine (ATARAX) 10 MG tablet Take 2 tablets by mouth Every Night. 180 tablet 0    irbesartan-hydrochlorothiazide (AVALIDE) 300-12.5 MG tablet Take 1 tablet by mouth Daily. 90 tablet 0    lamoTRIgine  MG tablet sustained-release 24 hour Take 1 tablet by mouth 2 (two) times a day. 180 tablet 3    liothyronine (CYTOMEL) 5 MCG tablet Take 0.5 tablets by mouth Daily.      LORazepam (ATIVAN) 0.5 MG tablet Take 1 tablet by mouth Daily As Needed for anxiety 30 tablet 5    lurasidone (LATUDA) 40 MG tablet tablet Take 1 tablet by mouth Daily.      meloxicam (MOBIC) 15 MG tablet 1 PO Daily with food. 30 tablet 0    ondansetron (ZOFRAN) 8 MG tablet Take 1 tablet by mouth 3 (Three) Times a Day As Needed for Nausea. 21 tablet 0    pantoprazole (PROTONIX) 40 MG EC tablet Take 1  "tablet by mouth Daily. For further refill, pt needs to make appt. 90 tablet 2    zolpidem (AMBIEN) 10 MG tablet Take 1 tablet by mouth At Night As Needed. 30 tablet 5    levothyroxine (SYNTHROID, LEVOTHROID) 112 MCG tablet Take 1 tablet by mouth Every Morning Before Breakfast. 90 tablet 3    Estradiol 10 MCG insert Insert 1 Insert into the vagina 2 (Two) Times a Week.      ketoconazole (NIZORAL) 2 % shampoo Apply  topically to the appropriate area as directed 3 (Three) Times a Week. 120 mL 1    Vibegron (Gemtesa) 75 MG tablet Take 1 tablet by mouth Daily. Indications: Overactive Bladder (Patient not taking: Reported on 4/16/2025)       No facility-administered medications prior to visit.     No opioid medication identified on active medication list. I have reviewed chart for other potential  high risk medication/s and harmful drug interactions in the elderly.      Aspirin is not on active medication list.  Aspirin use is not indicated based on review of current medical condition/s. Risk of harm outweighs potential benefits.  .    Patient Active Problem List   Diagnosis    Vitamin D deficiency    Obstructive sleep apnea, adult    Gastroesophageal reflux disease    Lung nodule < 6cm on CT    Papillary thyroid carcinoma    Essential hypertension    Bipolar 1 disorder    Polyp of colon    Arellano's esophagus without dysplasia     Advance Care Planning Advance Directive is not on file.  ACP discussion was held with the patient during this visit. Patient has an advance directive (not in EMR), copy requested.            Objective   Vitals:    04/16/25 1409   BP: 120/72   Pulse: 76   Weight: 72.1 kg (159 lb)   Height: 170.2 cm (67\")       Estimated body mass index is 24.9 kg/m² as calculated from the following:    Height as of this encounter: 170.2 cm (67\").    Weight as of this encounter: 72.1 kg (159 lb).    BMI is within normal parameters. No other follow-up for BMI required.           Does the patient have evidence of " cognitive impairment? No                                                                                                Health  Risk Assessment    Smoking Status:  Social History     Tobacco Use   Smoking Status Never   Smokeless Tobacco Never     Alcohol Consumption:  Social History     Substance and Sexual Activity   Alcohol Use Not Currently       Fall Risk Screen  STEADI Fall Risk Assessment was completed, and patient is at LOW risk for falls.Assessment completed on:2025    Depression Screening   Little interest or pleasure in doing things? Not at all   Feeling down, depressed, or hopeless? Not at all   PHQ-2 Total Score 0      Health Habits and Functional and Cognitive Screenin/16/2025     2:11 PM   Functional & Cognitive Status   Do you have difficulty preparing food and eating? No   Do you have difficulty bathing yourself, getting dressed or grooming yourself? No   Do you have difficulty using the toilet? No   Do you have difficulty moving around from place to place? No   Do you have trouble with steps or getting out of a bed or a chair? No   Current Diet Other   Dental Exam Up to date   Eye Exam Up to date   Exercise (times per week) 0 times per week   Current Exercises Include No Regular Exercise   Do you need help using the phone?  No   Are you deaf or do you have serious difficulty hearing?  No   Do you need help to go to places out of walking distance? No   Do you need help shopping? No   Do you need help preparing meals?  No   Do you need help with housework?  No   Do you need help with laundry? No   Do you need help taking your medications? No   Do you need help managing money? No   Do you ever drive or ride in a car without wearing a seat belt? No   Have you felt unusual stress, anger or loneliness in the last month? No   Who do you live with? Spouse   If you need help, do you have trouble finding someone available to you? No   Have you been bothered in the last four weeks by sexual  problems? No   Do you have difficulty concentrating, remembering or making decisions? No           Age-appropriate Screening Schedule:  Refer to the list below for future screening recommendations based on patient's age, sex and/or medical conditions. Orders for these recommended tests are listed in the plan section. The patient has been provided with a written plan.    Health Maintenance List  Health Maintenance   Topic Date Due    COVID-19 Vaccine (4 - 2024-25 season) 09/01/2024    LIPID PANEL  03/27/2025    ANNUAL WELLNESS VISIT  04/01/2025    INFLUENZA VACCINE  07/01/2025    COLORECTAL CANCER SCREENING  02/02/2026    DXA SCAN  04/04/2026    MAMMOGRAM  04/30/2026    GASTROSCOPY (EGD)  05/10/2027    TDAP/TD VACCINES (2 - Td or Tdap) 12/16/2031    HEPATITIS C SCREENING  Completed    Pneumococcal Vaccine 50+  Completed    ZOSTER VACCINE  Completed                                                                                                                                                CMS Preventative Services Quick Reference  Risk Factors Identified During Encounter  None Identified    The above risks/problems have been discussed with the patient.  Pertinent information has been shared with the patient in the After Visit Summary.  An After Visit Summary and PPPS were made available to the patient.    Follow Up:   Next Medicare Wellness visit to be scheduled in 1 year.         Additional E&M Note during same encounter follows:  Patient has additional, significant, and separately identifiable condition(s)/problem(s) that require work above and beyond the Medicare Wellness Visit     Chief Complaint  Medicare Wellness-subsequent    Subjective   HPI  Eileen is also being seen today for additional medical problem/s.  Feels she is doing better- takes care of 2 grandchildren 3 days a week which helps her mood.   Drinks a lot of coffee- takes an otc acid suppressant and daily pantoprazole.  She cont to have reflux/burning  "symptoms.               Objective   Vital Signs:  /72   Pulse 76   Ht 170.2 cm (67\")   Wt 72.1 kg (159 lb)   BMI 24.90 kg/m²   Physical Exam  Constitutional:       Appearance: Normal appearance.   Cardiovascular:      Rate and Rhythm: Normal rate and regular rhythm.   Pulmonary:      Effort: Pulmonary effort is normal.      Breath sounds: Normal breath sounds.   Chest:   Breasts:     Right: Normal.      Left: Normal.   Musculoskeletal:      Right lower leg: No edema.      Left lower leg: No edema.   Lymphadenopathy:      Cervical: No cervical adenopathy.      Upper Body:      Right upper body: No supraclavicular or axillary adenopathy.      Left upper body: No supraclavicular or axillary adenopathy.   Psychiatric:         Mood and Affect: Mood normal.              Assessment and Plan         Papillary thyroid carcinoma    Encounter for breast cancer screening using non-mammogram modality    Extremely dense tissue of both breasts on mammography    Other abnormal and inconclusive findings on diagnostic imaging of breast    Dysphagia, unspecified type    Essential hypertension    Medicare annual wellness visit, subsequent    Diagnoses and all orders for this visit:    1. Medicare annual wellness visit, subsequent (Primary)  Comments:  feels she is better- cont f/u with Dr. Jones- discussed getting out with friends, cont AA meetings. Address labs when available- cont regular exercise!    2. Papillary thyroid carcinoma  Overview:  tx thyroidectomy I 131 2010    Orders:  -     levothyroxine (SYNTHROID, LEVOTHROID) 150 MCG tablet; Take 1 tablet by mouth Every Morning Before Breakfast.  Dispense: 90 tablet; Refill: 1    3. Encounter for breast cancer screening using non-mammogram modality  -     MRI Breast Bilateral Screening With & Without Contrast; Future    4. Extremely dense tissue of both breasts on mammography  Comments:  discussed mmg vs MRI- recommend MRI with elevated breast cancer risk.  Orders:  -     " MRI Breast Bilateral Screening With & Without Contrast; Future    5. Other abnormal and inconclusive findings on diagnostic imaging of breast  -     MRI Breast Bilateral Screening With & Without Contrast; Future    6. Dysphagia, unspecified type  Comments:  needs further GI eval since she is having symptoms despite PPI therapy- would cut back on coffee.  Orders:  -     Ambulatory Referral to Gastroenterology    7. Essential hypertension  Comments:  excellent control, no change.       New Medications Ordered This Visit   Medications    levothyroxine (SYNTHROID, LEVOTHROID) 150 MCG tablet     Sig: Take 1 tablet by mouth Every Morning Before Breakfast.     Dispense:  90 tablet     Refill:  1          Follow Up   Return in about 6 months (around 10/16/2025) for Recheck.  Patient was given instructions and counseling regarding her condition or for health maintenance advice. Please see specific information pulled into the AVS if appropriate.

## 2025-04-16 NOTE — TELEPHONE ENCOUNTER
It does not matter.  We have not seen them since 2023 we will not refill.  They need to call their primary care

## 2025-04-21 ENCOUNTER — LAB (OUTPATIENT)
Facility: HOSPITAL | Age: 70
End: 2025-04-21
Payer: MEDICARE

## 2025-04-21 ENCOUNTER — TELEPHONE (OUTPATIENT)
Dept: INTERNAL MEDICINE | Facility: CLINIC | Age: 70
End: 2025-04-21
Payer: MEDICARE

## 2025-04-21 PROCEDURE — 80053 COMPREHEN METABOLIC PANEL: CPT | Performed by: INTERNAL MEDICINE

## 2025-04-21 RX ORDER — VIBEGRON 75 MG/1
75 TABLET, FILM COATED ORAL DAILY
Qty: 30 TABLET | Refills: 2 | Status: SHIPPED | OUTPATIENT
Start: 2025-04-21

## 2025-04-21 NOTE — TELEPHONE ENCOUNTER
Patient returning call to Deborah (MA) , read the message from Dr. Rodgers that is attached to the patient's labs, patient understood, but wanted to know what acute illness could she have with no symptoms or anything, please advise?  [phone].

## 2025-04-24 ENCOUNTER — OFFICE VISIT (OUTPATIENT)
Dept: GASTROENTEROLOGY | Facility: CLINIC | Age: 70
End: 2025-04-24
Payer: MEDICARE

## 2025-04-24 ENCOUNTER — TELEPHONE (OUTPATIENT)
Dept: GASTROENTEROLOGY | Facility: CLINIC | Age: 70
End: 2025-04-24
Payer: MEDICARE

## 2025-04-24 VITALS
WEIGHT: 160.4 LBS | TEMPERATURE: 97.7 F | HEART RATE: 73 BPM | DIASTOLIC BLOOD PRESSURE: 76 MMHG | BODY MASS INDEX: 25.18 KG/M2 | SYSTOLIC BLOOD PRESSURE: 135 MMHG | HEIGHT: 67 IN

## 2025-04-24 DIAGNOSIS — K21.9 GASTROESOPHAGEAL REFLUX DISEASE, UNSPECIFIED WHETHER ESOPHAGITIS PRESENT: Primary | ICD-10-CM

## 2025-04-24 DIAGNOSIS — K22.70 BARRETT'S ESOPHAGUS WITHOUT DYSPLASIA: ICD-10-CM

## 2025-04-24 PROCEDURE — 99213 OFFICE O/P EST LOW 20 MIN: CPT | Performed by: INTERNAL MEDICINE

## 2025-04-24 PROCEDURE — 1159F MED LIST DOCD IN RCRD: CPT | Performed by: INTERNAL MEDICINE

## 2025-04-24 PROCEDURE — 1160F RVW MEDS BY RX/DR IN RCRD: CPT | Performed by: INTERNAL MEDICINE

## 2025-04-24 PROCEDURE — 3075F SYST BP GE 130 - 139MM HG: CPT | Performed by: INTERNAL MEDICINE

## 2025-04-24 PROCEDURE — 3078F DIAST BP <80 MM HG: CPT | Performed by: INTERNAL MEDICINE

## 2025-04-24 RX ORDER — SUCRALFATE 1 G/1
1 TABLET ORAL 2 TIMES DAILY
Qty: 60 TABLET | Refills: 5 | Status: SHIPPED | OUTPATIENT
Start: 2025-04-24

## 2025-04-24 RX ORDER — SODIUM CHLORIDE, SODIUM LACTATE, POTASSIUM CHLORIDE, CALCIUM CHLORIDE 600; 310; 30; 20 MG/100ML; MG/100ML; MG/100ML; MG/100ML
30 INJECTION, SOLUTION INTRAVENOUS CONTINUOUS
OUTPATIENT
Start: 2025-04-24 | End: 2025-04-24

## 2025-04-24 NOTE — TELEPHONE ENCOUNTER
SUSAN FORD IN SCHEDULING PT SCHEDULED  05/01/2025 ARRIVING AT 845AM EGD PREP INFORMATION SHEET HANDED TO PT OK FOR HUB TO RELAY

## 2025-04-24 NOTE — PROGRESS NOTES
Chief Complaint   Patient presents with    Heartburn        Cathy Gutiérrez is a  70 y.o. female here for a follow up visit for GERD, history of polyps, history of Arellano's esophagus    HPI this 70-year-old female patient of Dr. Deborah Rodgers presents since undergoing upper endoscopic evaluation in May 2024.  That study was done because of reflux, dysphagia, and a follow-up for Arellano's esophagus.  Biopsies obtained in that setting were relatively normal.  She has been on pantoprazole to manage her reflux and states her current symptoms are more of a scalded or burning sensation involving the length of her esophagus.  This does not seem to be directly related to p.o. intake and can occur at any time.  I encouraged her to consider upper endoscopic evaluation to see if anything has changed and also will initiate Carafate suspension to be taken on a maintenance dose schedule of twice daily.  She is due for follow-up colonoscopy in February 2026 given her history of polyps.    Past Medical History:   Diagnosis Date    ADHD (attention deficit hyperactivity disorder) 2000    Ankle sprain 1982    Arthritis 2018    Arthritis of back 2019    Arthritis of neck 2017    Arellano esophagus 2020    Cancer 2010    throid    Cervical disc disorder 2015    Depression     Disease of thyroid gland 2010    Fracture of ankle 1982    Fracture, foot 2021    GERD (gastroesophageal reflux disease)     Hernia 2020    Hypertension 2012    Knee swelling 2019    Osteopenia 02/23    PONV (postoperative nausea and vomiting)     Sleep apnea     does not use machine    Tear of meniscus of knee 2016    Urinary tract infection 1980       Current Outpatient Medications   Medication Sig Dispense Refill    amphetamine-dextroamphetamine (ADDERALL) 10 MG tablet Take 1 tablet by mouth Daily. 30 tablet 0    buPROPion (WELLBUTRIN) 100 MG tablet       estradiol (VIVELLE-DOT) 0.1 MG/24HR patch PLACE 1 PATCH ON THE SKIN AS DIRECTED BY PROVIDER TWICE WEEKLY 24  patch 2    hydrOXYzine (ATARAX) 10 MG tablet Take 2 tablets by mouth Every Night. 180 tablet 0    irbesartan-hydrochlorothiazide (AVALIDE) 300-12.5 MG tablet Take 1 tablet by mouth Daily. 90 tablet 0    ketoconazole (NIZORAL) 2 % shampoo Apply  topically to the appropriate area as directed 3 (Three) Times a Week. 120 mL 1    lamoTRIgine  MG tablet sustained-release 24 hour Take 1 tablet by mouth 2 (two) times a day. 180 tablet 3    levothyroxine (SYNTHROID, LEVOTHROID) 150 MCG tablet Take 1 tablet by mouth Every Morning Before Breakfast. 90 tablet 1    liothyronine (CYTOMEL) 5 MCG tablet Take 0.5 tablets by mouth Daily.      LORazepam (ATIVAN) 0.5 MG tablet Take 1 tablet by mouth Daily As Needed for anxiety 30 tablet 5    lurasidone (LATUDA) 40 MG tablet tablet Take 1 tablet by mouth Daily.      ondansetron (ZOFRAN) 8 MG tablet Take 1 tablet by mouth 3 (Three) Times a Day As Needed for Nausea. 21 tablet 0    pantoprazole (PROTONIX) 40 MG EC tablet Take 1 tablet by mouth Daily. For further refill, pt needs to make appt. 90 tablet 2    Vibegron (Gemtesa) 75 MG tablet Take 1 tablet by mouth Daily. Indications: Overactive Bladder 30 tablet 2    zolpidem (AMBIEN) 10 MG tablet Take 1 tablet by mouth At Night As Needed. 30 tablet 5    Estradiol 10 MCG insert Insert 1 Insert into the vagina 2 (Two) Times a Week. (Patient not taking: Reported on 4/24/2025)      meloxicam (MOBIC) 15 MG tablet 1 PO Daily with food. (Patient not taking: Reported on 4/24/2025) 30 tablet 0     No current facility-administered medications for this visit.       PRN Meds:.    Allergies   Allergen Reactions    Latex Rash       Social History     Socioeconomic History    Marital status:    Tobacco Use    Smoking status: Never    Smokeless tobacco: Never   Vaping Use    Vaping status: Never Used   Substance and Sexual Activity    Alcohol use: Not Currently    Drug use: No    Sexual activity: Not Currently     Partners: Male     Birth  control/protection: Post-menopausal, Hysterectomy       Family History   Problem Relation Age of Onset    Breast cancer Maternal Grandmother     Breast cancer Paternal Grandmother     Breast cancer Maternal Aunt     Breast cancer Paternal Aunt     Alcohol abuse Mother 43    Heart disease Father     Hyperlipidemia Father     Diabetes Father     Emphysema Father     Parkinsonism Brother         bulbar supranuclear palsy    Diabetes Brother        Review of Systems   Constitutional:  Negative for activity change, appetite change, fatigue and unexpected weight change.   HENT:  Negative for congestion, facial swelling, sore throat, trouble swallowing and voice change.    Eyes:  Negative for photophobia and visual disturbance.   Respiratory:  Negative for cough and choking.    Cardiovascular:  Negative for chest pain.   Gastrointestinal:  Negative for abdominal distention, abdominal pain, anal bleeding, blood in stool, constipation, diarrhea, nausea, rectal pain and vomiting.        GERD   Endocrine: Negative for polyphagia.   Musculoskeletal:  Negative for arthralgias, gait problem and joint swelling.   Skin:  Negative for color change, pallor and rash.   Allergic/Immunologic: Negative for food allergies.   Neurological:  Negative for speech difficulty and headaches.   Hematological:  Does not bruise/bleed easily.   Psychiatric/Behavioral:  Negative for agitation, confusion and sleep disturbance.        Vitals:    04/24/25 1548   BP: 135/76   Pulse: 73   Temp: 97.7 °F (36.5 °C)       Physical Exam  Constitutional:       Appearance: She is well-developed.   HENT:      Head: Normocephalic.   Eyes:      Conjunctiva/sclera: Conjunctivae normal.   Cardiovascular:      Rate and Rhythm: Normal rate and regular rhythm.   Pulmonary:      Breath sounds: Normal breath sounds.   Abdominal:      General: Bowel sounds are normal.      Palpations: Abdomen is soft.   Musculoskeletal:         General: Normal range of motion.       Cervical back: Normal range of motion.   Skin:     General: Skin is warm and dry.   Neurological:      Mental Status: She is alert and oriented to person, place, and time.   Psychiatric:         Behavior: Behavior normal.         ASSESSMENT   #1 GERD  #2 history of Arellano's esophagus  #3 history of polyps      PLAN  Schedule EGD  Initiate Carafate suspension on maintenance schedule of twice daily  Continue PPI      ICD-10-CM ICD-9-CM   1. Gastroesophageal reflux disease, unspecified whether esophagitis present  K21.9 530.81   2. Arellano's esophagus without dysplasia  K22.70 530.85

## 2025-05-01 ENCOUNTER — HOSPITAL ENCOUNTER (OUTPATIENT)
Facility: HOSPITAL | Age: 70
Setting detail: HOSPITAL OUTPATIENT SURGERY
Discharge: HOME | End: 2025-05-01
Attending: INTERNAL MEDICINE | Admitting: INTERNAL MEDICINE
Payer: MEDICARE

## 2025-05-01 ENCOUNTER — ANESTHESIA EVENT (OUTPATIENT)
Dept: GASTROENTEROLOGY | Facility: HOSPITAL | Age: 70
End: 2025-05-01
Payer: MEDICARE

## 2025-05-01 ENCOUNTER — ANESTHESIA (OUTPATIENT)
Dept: GASTROENTEROLOGY | Facility: HOSPITAL | Age: 70
End: 2025-05-01
Payer: MEDICARE

## 2025-05-01 VITALS
SYSTOLIC BLOOD PRESSURE: 139 MMHG | RESPIRATION RATE: 12 BRPM | HEART RATE: 62 BPM | HEIGHT: 67 IN | BODY MASS INDEX: 24.64 KG/M2 | OXYGEN SATURATION: 96 % | DIASTOLIC BLOOD PRESSURE: 64 MMHG | WEIGHT: 157 LBS

## 2025-05-01 DIAGNOSIS — K21.9 GASTROESOPHAGEAL REFLUX DISEASE, UNSPECIFIED WHETHER ESOPHAGITIS PRESENT: ICD-10-CM

## 2025-05-01 DIAGNOSIS — K22.70 BARRETT'S ESOPHAGUS WITHOUT DYSPLASIA: ICD-10-CM

## 2025-05-01 PROCEDURE — 25010000002 PROPOFOL 10 MG/ML EMULSION: Performed by: NURSE ANESTHETIST, CERTIFIED REGISTERED

## 2025-05-01 PROCEDURE — 25010000002 ONDANSETRON PER 1 MG: Performed by: NURSE ANESTHETIST, CERTIFIED REGISTERED

## 2025-05-01 PROCEDURE — 43239 EGD BIOPSY SINGLE/MULTIPLE: CPT | Performed by: INTERNAL MEDICINE

## 2025-05-01 PROCEDURE — 88305 TISSUE EXAM BY PATHOLOGIST: CPT | Performed by: INTERNAL MEDICINE

## 2025-05-01 PROCEDURE — S0260 H&P FOR SURGERY: HCPCS | Performed by: INTERNAL MEDICINE

## 2025-05-01 PROCEDURE — 25010000002 LIDOCAINE 2% SOLUTION: Performed by: NURSE ANESTHETIST, CERTIFIED REGISTERED

## 2025-05-01 PROCEDURE — 25810000003 LACTATED RINGERS PER 1000 ML: Performed by: INTERNAL MEDICINE

## 2025-05-01 RX ORDER — PROPOFOL 10 MG/ML
VIAL (ML) INTRAVENOUS AS NEEDED
Status: DISCONTINUED | OUTPATIENT
Start: 2025-05-01 | End: 2025-05-01 | Stop reason: SURG

## 2025-05-01 RX ORDER — ONDANSETRON 2 MG/ML
INJECTION INTRAMUSCULAR; INTRAVENOUS AS NEEDED
Status: DISCONTINUED | OUTPATIENT
Start: 2025-05-01 | End: 2025-05-01 | Stop reason: SURG

## 2025-05-01 RX ORDER — SODIUM CHLORIDE, SODIUM LACTATE, POTASSIUM CHLORIDE, CALCIUM CHLORIDE 600; 310; 30; 20 MG/100ML; MG/100ML; MG/100ML; MG/100ML
30 INJECTION, SOLUTION INTRAVENOUS CONTINUOUS
Status: ACTIVE | OUTPATIENT
Start: 2025-05-01 | End: 2025-05-01

## 2025-05-01 RX ORDER — LIDOCAINE HYDROCHLORIDE 20 MG/ML
INJECTION, SOLUTION INFILTRATION; PERINEURAL AS NEEDED
Status: DISCONTINUED | OUTPATIENT
Start: 2025-05-01 | End: 2025-05-01 | Stop reason: SURG

## 2025-05-01 RX ADMIN — LIDOCAINE HYDROCHLORIDE 100 MG: 20 INJECTION, SOLUTION INFILTRATION; PERINEURAL at 09:59

## 2025-05-01 RX ADMIN — SODIUM CHLORIDE, POTASSIUM CHLORIDE, SODIUM LACTATE AND CALCIUM CHLORIDE 30 ML/HR: 600; 310; 30; 20 INJECTION, SOLUTION INTRAVENOUS at 09:19

## 2025-05-01 RX ADMIN — PROPOFOL 200 MCG/KG/MIN: 10 INJECTION, EMULSION INTRAVENOUS at 09:59

## 2025-05-01 RX ADMIN — PROPOFOL 100 MG: 10 INJECTION, EMULSION INTRAVENOUS at 09:59

## 2025-05-01 RX ADMIN — ONDANSETRON 4 MG: 2 INJECTION, SOLUTION INTRAMUSCULAR; INTRAVENOUS at 10:02

## 2025-05-01 NOTE — ANESTHESIA PREPROCEDURE EVALUATION
Anesthesia Evaluation     Patient summary reviewed and Nursing notes reviewed                Airway   Mallampati: II  Dental      Pulmonary    (+) ,sleep apnea  Cardiovascular     ECG reviewed  Rhythm: regular  Rate: normal    (+) hypertension      Neuro/Psych  (+) psychiatric history Anxiety, Depression and Bipolar  GI/Hepatic/Renal/Endo    (+) GERD, thyroid problem hypothyroidism and thyroid cancer    Musculoskeletal     Abdominal    Substance History - negative use     OB/GYN negative ob/gyn ROS         Other   arthritis,   history of cancer                Anesthesia Plan    ASA 2     MAC     intravenous induction     Anesthetic plan, risks, benefits, and alternatives have been provided, discussed and informed consent has been obtained with: patient.    CODE STATUS:

## 2025-05-01 NOTE — DISCHARGE INSTRUCTIONS
For the next 24 hours patient needs to be with a responsible adult.    For THE REST OF TODAY DO NOT drive, operate machinery, appliances, drink alcohol, make important decisions or sign legal documents.    Start with a light or bland diet if you are feeling sick to your stomach otherwise advance to regular diet as tolerated.    Follow recommendations on procedure report if provided by your doctor.    Call Dr Kelley for problems [phone]     Problems may include but not limited to: large amounts of bleeding, trouble breathing, repeated vomiting, severe unrelieved pain, fever or chills.      If biopsies or polyps were taken, MD will call you with the results in about 7 days. If you don't hear from the MD in 2 weeks, call the number above.

## 2025-05-01 NOTE — ANESTHESIA POSTPROCEDURE EVALUATION
Patient: Cathy Gutiérrez    Procedure Summary       Date: 05/01/25 Room / Location:  [facility]    Anesthesia Start: 0955 Anesthesia Stop: 1011    Procedure: ESOPHAGOGASTRODUODENOSCOPY WITH COLD BIOPSIES (Esophagus) Diagnosis:       Esophagitis      Gastritis      (Gastroesophageal reflux disease, unspecified whether esophagitis present [K21.9])      (Arellano's esophagus without dysplasia [K22.70])    Surgeons: Angelito Kelley MD Provider: Tiburcio Hester MD    Anesthesia Type: MAC ASA Status: 2            Anesthesia Type: MAC    Vitals  Vitals Value Taken Time   /64 05/01/25 10:31   Temp     Pulse 63 05/01/25 10:35   Resp 12 05/01/25 10:31   SpO2 97 % 05/01/25 10:34   Vitals shown include unfiled device data.        Post Anesthesia Care and Evaluation    Patient location during evaluation: PACU  Patient participation: complete - patient participated  Level of consciousness: awake and alert  Pain management: adequate    Airway patency: patent  Anesthetic complications: No anesthetic complications    Cardiovascular status: acceptable  Respiratory status: acceptable  Hydration status: acceptable    Comments: --------------------            05/01/25               1031     --------------------   BP:       139/64     Pulse:      62       Resp:       12       SpO2:      96%      --------------------

## 2025-05-01 NOTE — H&P
[facility]  Pre Procedure History & Physical    Chief Complaint:   GERD, Story's esophagus    Subjective     HPI:   This 70-year-old female presents the endoscopy suite for upper endoscopic evaluation.  She has issues with reflux as well as known history of Story's esophagus.    Past Medical History:   Past Medical History:   Diagnosis Date    ADHD (attention deficit hyperactivity disorder) 2000    Ankle sprain 1982    Arthritis 2018    Arthritis of back 2019    Arthritis of neck 2017    Story esophagus 2020    Cancer 2010    throid    Cervical disc disorder 2015    Depression     Disease of thyroid gland 2010    Fracture of ankle 1982    Fracture, foot 2021    GERD (gastroesophageal reflux disease)     Hernia 2020    Hypertension 2012    Knee swelling 2019    Osteopenia 02/23    PONV (postoperative nausea and vomiting)     Sleep apnea     does not use machine    Tear of meniscus of knee 2016    Urinary tract infection 1980       Past Surgical History:  Past Surgical History:   Procedure Laterality Date    BREAST BIOPSY      BUNIONECTOMY Bilateral     COLONOSCOPY  approx 2014    normal per pt    COLONOSCOPY N/A 02/02/2021    Procedure: COLONOSCOPY INTO CECUM;  Surgeon: Angelito Kelley MD;  Location: [facility] ENDOSCOPY;  Service: Gastroenterology;  Laterality: N/A;  PRE: PERSONAL H/O COLON POLYPS  POST: DIVERTICULOSIS, TOROUS COLON    COSMETIC SURGERY      DILATATION AND CURETTAGE      ELBOW PROCEDURE  2014    ENDOSCOPY N/A 09/06/2019    Procedure: ESOPHAGOGASTRODUODENOSCOPY with cold biopsies and balloon dilitation size 15, 16.5, 18;  Surgeon: Angelito Kelley MD;  Location: [facility] ENDOSCOPY;  Service: Gastroenterology    ENDOSCOPY N/A 03/12/2021    Procedure: ESOPHAGOGASTRODUODENOSCOPY WITH COLD BIOPSIES;  Surgeon: Angelito Kelley MD;  Location: [facility] ENDOSCOPY;  Service: Gastroenterology;  Laterality: N/A;  PRE: STORY'S ESOPHAGUS, REFLUX, DYSPHAGIA  POST: MILD  ESOPHAGITIS, GASTRITIS, DUODENITIS    ENDOSCOPY N/A 05/10/2024    Procedure: ESOPHAGOGASTRODUODENOSCOPY with biopsies;  Surgeon: Angelito Kelley MD;  Location: [facility] ENDOSCOPY;  Service: Gastroenterology;  Laterality: N/A;  preop-hx of Arellano's; dyspagia; reflux  postop-hiatal hernnia; gastritis    EPIDURAL BLOCK      FRACTURE SURGERY      HAND SURGERY  2004    HYSTERECTOMY  1992    THYROID SURGERY      TRIGGER POINT INJECTION  2018    UPPER GASTROINTESTINAL ENDOSCOPY  approx 2014    pt doesn't recall        Family History:  Family History   Problem Relation Age of Onset    Alcohol abuse Mother 43    Heart disease Father     Hyperlipidemia Father     Diabetes Father     Emphysema Father     Parkinsonism Brother         bulbar supranuclear palsy    Diabetes Brother     Breast cancer Maternal Aunt     Breast cancer Paternal Aunt     Breast cancer Maternal Grandmother     Breast cancer Paternal Grandmother     Malig Hyperthermia Neg Hx        Social History:   reports that she has never smoked. She has never used smokeless tobacco. She reports that she does not currently use alcohol. She reports that she does not use drugs.    Medications:   No medications prior to admission.       Allergies:  Latex    ROS:    Pertinent items are noted in HPI     Objective     not currently breastfeeding.    Physical Exam   Constitutional: Pt is oriented to person, place, and time and well-developed, well-nourished, and in no distress.   Mouth/Throat: Oropharynx is clear and moist.   Neck: Normal range of motion.   Cardiovascular: Normal rate, regular rhythm and normal heart sounds.    Pulmonary/Chest: Effort normal and breath sounds normal.   Abdominal: Soft. Nontender  Skin: Skin is warm and dry.   Psychiatric: Mood, memory, affect and judgment normal.     Assessment & Plan     Diagnosis:    GERD  Arellano's esophagus    Anticipated Surgical Procedure:  EGD    The risks, benefits, and alternatives of this procedure have been  discussed with the patient or the responsible party- the patient understands and agrees to proceed.

## 2025-05-05 LAB
CYTO UR: NORMAL
LAB AP CASE REPORT: NORMAL
LAB AP DIAGNOSIS COMMENT: NORMAL
PATH REPORT.FINAL DX SPEC: NORMAL
PATH REPORT.GROSS SPEC: NORMAL

## 2025-05-09 ENCOUNTER — TELEPHONE (OUTPATIENT)
Dept: GASTROENTEROLOGY | Facility: CLINIC | Age: 70
End: 2025-05-09
Payer: MEDICARE

## 2025-05-09 NOTE — TELEPHONE ENCOUNTER
"         Hub staff attempted to follow warm transfer process and was unsuccessful     Caller: Cathy Gutiérrez \"Eileen\"    Relationship to patient: Self    Best call back number:  [phone]    Patient is needing: PT IS RETURNING CALL TO THE OFFICE REGARDING RESULTS, PLEASE REACH BACK OUT TO PT.            "

## 2025-05-15 RX ORDER — PANTOPRAZOLE SODIUM 40 MG/1
TABLET, DELAYED RELEASE ORAL
Qty: 90 TABLET | Refills: 3 | Status: SHIPPED | OUTPATIENT
Start: 2025-05-15

## 2025-05-28 ENCOUNTER — HOSPITAL ENCOUNTER (OUTPATIENT)
Dept: MRI IMAGING | Facility: HOSPITAL | Age: 70
Discharge: HOME | End: 2025-05-28
Admitting: INTERNAL MEDICINE
Payer: MEDICARE

## 2025-05-28 DIAGNOSIS — R92.8 OTHER ABNORMAL AND INCONCLUSIVE FINDINGS ON DIAGNOSTIC IMAGING OF BREAST: ICD-10-CM

## 2025-05-28 DIAGNOSIS — Z12.39 ENCOUNTER FOR BREAST CANCER SCREENING USING NON-MAMMOGRAM MODALITY: ICD-10-CM

## 2025-05-28 DIAGNOSIS — R92.343 EXTREMELY DENSE TISSUE OF BOTH BREASTS ON MAMMOGRAPHY: ICD-10-CM

## 2025-05-28 PROCEDURE — 25510000002 GADOBENATE DIMEGLUMINE 529 MG/ML SOLUTION: Performed by: INTERNAL MEDICINE

## 2025-05-28 PROCEDURE — A9577 INJ MULTIHANCE: HCPCS | Performed by: INTERNAL MEDICINE

## 2025-05-28 PROCEDURE — C8908 MRI W/O FOL W/CONT, BREAST,: HCPCS

## 2025-05-28 PROCEDURE — C8937 CAD BREAST MRI: HCPCS

## 2025-05-28 RX ADMIN — GADOBENATE DIMEGLUMINE 14 ML: 529 INJECTION, SOLUTION INTRAVENOUS at 11:51

## 2025-06-18 RX ORDER — VIBEGRON 75 MG/1
TABLET, FILM COATED ORAL
Qty: 30 TABLET | Refills: 2 | Status: SHIPPED | OUTPATIENT
Start: 2025-06-18

## 2025-07-08 DIAGNOSIS — I10 ESSENTIAL HYPERTENSION: Chronic | ICD-10-CM

## 2025-07-08 RX ORDER — IRBESARTAN AND HYDROCHLOROTHIAZIDE 300; 12.5 MG/1; MG/1
1 TABLET, FILM COATED ORAL DAILY
Qty: 90 TABLET | Refills: 0 | Status: SHIPPED | OUTPATIENT
Start: 2025-07-08

## 2025-07-11 RX ORDER — HYDROXYZINE HYDROCHLORIDE 10 MG/1
20 TABLET, FILM COATED ORAL NIGHTLY
Qty: 180 TABLET | Refills: 0 | Status: SHIPPED | OUTPATIENT
Start: 2025-07-11

## 2025-07-17 RX ORDER — SUCRALFATE 1 G/1
TABLET ORAL
Qty: 180 TABLET | Refills: 3 | Status: SHIPPED | OUTPATIENT
Start: 2025-07-17

## 2025-07-17 NOTE — TELEPHONE ENCOUNTER
Okay for refills on Carafate 1 g tablets to be suspended in 10 cc of water and taken twice daily with a total of 180 tablets and refills x 3.  Per Dr Kelley.       Above script escribed as ordered above to pt's Rashad.

## 2025-07-28 RX ORDER — ESTRADIOL 0.1 MG/D
FILM, EXTENDED RELEASE TRANSDERMAL
Qty: 24 PATCH | Refills: 2 | Status: SHIPPED | OUTPATIENT
Start: 2025-07-28

## (undated) DEVICE — ADAPT CLN BIOGUARD AIR/H2O DISP

## (undated) DEVICE — TUBING, SUCTION, 1/4" X 10', STRAIGHT: Brand: MEDLINE

## (undated) DEVICE — KT ORCA ORCAPOD DISP STRL

## (undated) DEVICE — MSK PROC CURAPLEX O2 2/ADAPT 7FT

## (undated) DEVICE — SENSR O2 OXIMAX FNGR A/ 18IN NONSTR

## (undated) DEVICE — FRCP BX RADJAW4 NDL 2.8 240CM LG OG BX40

## (undated) DEVICE — DEV INFL CRE STERIFLATE 60CC DISP

## (undated) DEVICE — CANN O2 ETCO2 FITS ALL CONN CO2 SMPL A/ 7IN DISP LF

## (undated) DEVICE — BLCK/BITE BLOX W/DENTL/RIM W/STRAP 54F

## (undated) DEVICE — LN SMPL CO2 SHTRM SD STREAM W/M LUER

## (undated) DEVICE — CANN NASL CO2 TRULINK W/O2 A/

## (undated) DEVICE — Device: Brand: DEFENDO AIR/WATER/SUCTION AND BIOPSY VALVE

## (undated) DEVICE — ESOPHAGEAL BALLOON DILATATION CATHETER: Brand: CRE FIXED WIRE

## (undated) DEVICE — BITEBLOCK OMNI BLOC

## (undated) DEVICE — THE TORRENT IRRIGATION SCOPE CONNECTOR IS USED WITH THE TORRENT IRRIGATION TUBING TO PROVIDE IRRIGATION FLUIDS SUCH AS STERILE WATER DURING GASTROINTESTINAL ENDOSCOPIC PROCEDURES WHEN USED IN CONJUNCTION WITH AN IRRIGATION PUMP (OR ELECTROSURGICAL UNIT).: Brand: TORRENT